# Patient Record
Sex: MALE | Race: WHITE | NOT HISPANIC OR LATINO | Employment: OTHER | URBAN - METROPOLITAN AREA
[De-identification: names, ages, dates, MRNs, and addresses within clinical notes are randomized per-mention and may not be internally consistent; named-entity substitution may affect disease eponyms.]

---

## 2017-01-09 ENCOUNTER — OFFICE VISIT (OUTPATIENT)
Dept: CARDIOLOGY | Facility: CLINIC | Age: 77
End: 2017-01-09

## 2017-01-09 VITALS
WEIGHT: 183 LBS | SYSTOLIC BLOOD PRESSURE: 128 MMHG | HEART RATE: 56 BPM | HEIGHT: 69 IN | BODY MASS INDEX: 27.11 KG/M2 | DIASTOLIC BLOOD PRESSURE: 70 MMHG

## 2017-01-09 DIAGNOSIS — E78.5 HYPERLIPIDEMIA, UNSPECIFIED HYPERLIPIDEMIA TYPE: ICD-10-CM

## 2017-01-09 DIAGNOSIS — I25.10 OCCLUSIVE CORONARY ARTERY DISEASE: ICD-10-CM

## 2017-01-09 DIAGNOSIS — Z95.5 HISTORY OF CORONARY ARTERY STENT PLACEMENT: ICD-10-CM

## 2017-01-09 DIAGNOSIS — I10 BENIGN ESSENTIAL HYPERTENSION: Primary | ICD-10-CM

## 2017-01-09 PROCEDURE — 93000 ELECTROCARDIOGRAM COMPLETE: CPT | Performed by: PHYSICIAN ASSISTANT

## 2017-01-09 PROCEDURE — 99214 OFFICE O/P EST MOD 30 MIN: CPT | Performed by: PHYSICIAN ASSISTANT

## 2017-01-09 NOTE — PROGRESS NOTES
Date of Office Visit: 2017  Encounter Provider: LEBRON Saldaña  Place of Service: [facility] CARDIOLOGY  Patient Name: Sergey Mendoza  :1940    Chief Complaint   Patient presents with   • Coronary Artery Disease     1 year follow up   :     HPI: Sergey Mendoza is a 76 y.o. male, new to me, who presents today for follow-up.  Old records have been obtained and reviewed by me.  He is a patient of Dr. Manning's with a past medical history significant for coronary artery disease, status post stenting to his LAD in .  He also had a 30% circumflex and a 30% RCA lesion at that time.  He also has a chronically occluded diagonal branch.  At one point he had ischemic cardiomyopathy, however this resolved after treating his LAD lesion.  He was last seen in our office on 2015 by Dr. Manning.  At that visit, he was doing well from a cardiac standpoint.   Over the past year, he's been doing well.  He has no complaints of angina or heart failure.  His right knee eventually will need to be replaced, however he is trying to wait as long as possible before getting this done.  He does walk for exercise, and this does not affect his knee.  During the winter months he notices that he is not walking as much as.  During the summer, he walks about 3 miles 5 days a week.      Past Medical History   Diagnosis Date   • History of acute sinusitis 2016--patient seen in follow-up and reports his symptoms have resolved.  2016--patient was evaluated at the urgent care for complaints of a sore throat as well as head congestion and sinus pressure.  It is interesting in that there are 2 by the same physician on this day and the presenting complaint is a sore throat on one and chest pain on the other.  However, the assessment noted on the presentation with chest pain was acute maxillary sinusitis.  At any rate, patient did not have chest pain at that visit.  He was treated with an  unknown antibiotic (this was not documented).   • History of bone density study 07/05/2005 07/05/2005--normal bone density study.   • History of cardiac catheterization 10/8/2010     10/08/2010--patient presented with an anterior myocardial infarction. Heart catheterization revealed anterior/apical hypokinesis with 40% ejection fraction. Severe 80% ostial LAD stenosis. Mild 10% left main. Mild diffuse LAD. Totally occluded diagonal one. 30% diffuse circumflex. Mild 30% percent diffuse RCA. Angioplasty with drug eluding stent performed to the proximal LAD.    • History of cardiovascular stress test 02/23/2011 02/23/2011--stress test revealed mild anterior wall ischemia. Ejection fraction 64%.   • History of carotid Doppler/vascular screen 03/30/2011 03/30/2011--vascular screen revealed mild bilateral carotid plaque, negative for abdominal aneurysm, negative for PAD.   04/21/2009--vascular screen was negative for carotid plaque.   • History of Chronic Duodenitis 1/9/2012 01/09/2012--EGD revealed mild distal erythema of the stomach and proximal duodenum.   • History of closed Colles' fracture of left radius 05/05/1953     13 years of age.   • History of elevated prostate specific antigen (PSA) 03/19/2014 03/19/2014--he was seen in followup and his PSA was back down to 3.9. Patient was not having any urinary symptoms.  12/11/2013--patient was evaluated by the urologist and he elected to just observe the PSA.   11/06/2013--patient seen in followup in his PSA returned even higher at 5.7. He was referred back to urology.   11/06/2013--PSA returned elevated at 4.3. Patient did have some urinary symptom   • History of Flu-like symptoms 02/28/2015 02/28/2015--patient reports that one week ago he began to develop a significant persistent cough that is dry and nonproductive. A couple of days ago he began to develop head congestion, posterior nasal drainage, and a marked sore throat associated with  fatigue and diffuse aches and pains. He has had no fever and in fact his temperature has been running a little supple normal. No shaking chills. No   • History of Holter monitoring 03/14/2006 03/14/2006--Holter monitor performed for palpitations revealed brief episodes of atrial tachycardia only.   • History of pneumococcal vaccination 10/31/2011     10/31/2011--Pneumovax given.   • History of Tear of medial meniscus of right knee 10/17/2014     12/09/2014--patient was evaluated by the orthopedist. He felt that patient also had end stage osteoarthritis the right knee and will eventually need a total knee replacement. In the meantime he gave a cortisone injection in the knee and patient reports this did help. Patient also went to physical therapy which seemed to help somewhat also.   10/29/2014--MRI of the right knee reveals tricompartmental osteoarthritis that is severe in the medial compartment. There is a degenerative tear of the medial meniscus. There is a joint effusion with Baker's cyst. Intramuscular extension of the Baker's cyst into the medial head of the gastrocnemius muscle. Patient referred to orthopedics.   10/17/2014--x-ray of the right knee reveals a small suprapatellar effusion. Narrowing of the medial compartment. There is patellar spur formation. No fracture, dislocation, bone lesion is demonstrated.   10/17/2014--patient was on tour bus in [place] this past October 2013. He was trying to sit down and was in an awkward position.        Past Surgical History   Procedure Laterality Date   • Cardiac catheterization  10/08/2010     See past medical history   • Coronary angioplasty with stent placement  10/10/2010     10/10/2010--critical 80% proximal LAD stenosis treated using a drug-coated stent. Unable to cross diagonal one.   • Colonoscopy  01/09/2012 01/09/2012--normal colonoscopy except for diffuse diverticulosis.    • Colonoscopy  2004 2004--colonoscopy normal.   • Esophagoscopy / egd   01/09/2012 01/09/2012--EGD performed for iron deficiency anemia revealed mild erythema of the distal stomach and proximal duodenum consistent with duodenitis.   • Prostate biopsy  07/07/2008 07/07/2008--negative prostate biopsy.  Patient had a history of elevated PSA and found to have benign prostatic hypertrophy.        Social History     Social History   • Marital status:      Spouse name: N/A   • Number of children: N/A   • Years of education: N/A     Occupational History   • Retired - [employer]      Social History Main Topics   • Smoking status: Former Smoker   • Smokeless tobacco: Never Used      Comment: Stopped smoking in his early 20s.   • Alcohol use Yes      Comment: Socially   • Drug use: No   • Sexual activity: Yes     Partners: Female     Other Topics Concern   • Not on file     Social History Narrative       Family History   Problem Relation Age of Onset   • Coronary artery disease Mother    • Stroke Mother    • No Known Problems Father    • Cancer Maternal Grandmother    • Heart attack Maternal Grandfather    • Heart disease Maternal Grandfather    • No Known Problems Paternal Grandmother    • No Known Problems Paternal Grandfather        Review of Systems   Constitution: Negative for chills, fever, malaise/fatigue, weight gain and weight loss.   HENT: Negative for ear pain, headaches, hearing loss, nosebleeds and sore throat.    Eyes: Negative for double vision, pain and visual disturbance.   Cardiovascular: Negative for chest pain, dyspnea on exertion, irregular heartbeat, leg swelling, near-syncope, orthopnea, palpitations, paroxysmal nocturnal dyspnea and syncope.   Respiratory: Negative for cough, shortness of breath, sleep disturbances due to breathing, snoring and wheezing.    Endocrine: Negative for cold intolerance, heat intolerance and polyuria.   Skin: Negative for itching and rash.   Musculoskeletal: Positive for joint pain. Negative for joint swelling and  "myalgias.   Gastrointestinal: Negative for abdominal pain, diarrhea, melena, nausea and vomiting.   Genitourinary: Negative for frequency, hematuria and hesitancy.   Neurological: Negative for excessive daytime sleepiness, light-headedness, numbness, paresthesias and seizures.   Psychiatric/Behavioral: Negative for altered mental status and depression.   Allergic/Immunologic: Negative.    All other systems reviewed and are negative.      Allergies   Allergen Reactions   • Penicillins    • Sulfa Antibiotics          Current Outpatient Prescriptions:   •  aspirin 325 MG tablet, Take 81 tablets by mouth Daily., Disp: , Rfl:   •  Coenzyme Q10 (COQ10) 400 MG capsule, Take 1 capsule by mouth daily. Take with a meal., Disp: , Rfl:   •  ezetimibe (ZETIA) 10 MG tablet, 1 by mouth daily for cholesterol, Disp: 90 tablet, Rfl: 3  •  ferrous sulfate 325 (65 FE) MG tablet, Take 1 tablet by mouth daily. Take with food., Disp: , Rfl:   •  metoprolol succinate XL (TOPROL-XL) 50 MG 24 hr tablet, TAKE 1 TABLET EVERY DAY, Disp: 90 tablet, Rfl: 1  •  simvastatin (ZOCOR) 40 MG tablet, TAKE 1 TABLET AT BEDTIME  FOR  CHOLESTEROL, Disp: 90 tablet, Rfl: 0     Objective:     Vitals:    01/09/17 1412   BP: 128/70   BP Location: Left arm   Pulse: 56   Weight: 183 lb (83 kg)   Height: 69\" (175.3 cm)     Body mass index is 27.02 kg/(m^2).    PHYSICAL EXAM:    Physical Exam   Constitutional: He is oriented to person, place, and time. He appears well-developed and well-nourished. No distress.   HENT:   Head: Normocephalic and atraumatic.   Eyes: Pupils are equal, round, and reactive to light.   Neck: No JVD present. No thyromegaly present.   Cardiovascular: Normal rate, regular rhythm, normal heart sounds and intact distal pulses.    No murmur heard.  Pulmonary/Chest: Effort normal and breath sounds normal. No respiratory distress.   Abdominal: Soft. Bowel sounds are normal. He exhibits no distension. There is no splenomegaly or hepatomegaly. There " is no tenderness.   Musculoskeletal: Normal range of motion. He exhibits no edema.   Neurological: He is alert and oriented to person, place, and time.   Skin: Skin is warm and dry. He is not diaphoretic. No erythema.   Psychiatric: He has a normal mood and affect. His behavior is normal. Judgment normal.         ECG 12 Lead  Date/Time: 1/9/2017 2:30 PM  Performed by: ELIZABETH CHAMBERS.  Authorized by: ELIZABETH CHAMBERS   Comparison: compared with previous ECG from 11/2/2015  Similar to previous ECG  Rhythm: sinus rhythm  BPM: 56  Clinical impression: normal ECG  Comments: Indication: Coronary artery disease, status post stent placement.              Assessment:       Diagnosis Plan   1. Benign essential hypertension     2. Occlusive coronary artery disease, 10/08/2010--anterior MI.  EF 40%.  PTCA with drug-eluting stent proximal LAD.  10% LM.  100% diagonal 1.  30% diffuse circumflex.  30% diffuse RCA.  ECG 12 Lead   3. Hyperlipidemia, unspecified hyperlipidemia type     4. History of coronary artery stent placement       Orders Placed This Encounter   Procedures   • ECG 12 Lead     This order was created via procedure documentation          Plan:       1.  Coronary artery disease, status post stent placement.  Overall he's doing relatively well.  We did talk about risk factor modification.  I would like for him to increase his exercise.  He states that he is eating a mostly heart healthy diet.  He is on good medical treatment.  Continue current medical regimen.    2.  Hypertension.  His blood pressure is well-controlled today at 128/70.  Continue current medical regimen.    3.  Hyperlipidemia.  He is on simvastatin and Saturday a.  Continue current medical regimen.    He will follow-up with Dr. Manning in 1 year or sooner if needed.    As always, it has been a pleasure to participate in your patient's care.      Sincerely,         Elizabeth Chambers PA-C

## 2017-01-09 NOTE — MR AVS SNAPSHOT
Sergey Mendoza   1/9/2017 2:20 PM   Office Visit    Dept Phone:  [phone]   Encounter #:  [id number]    Provider:  LEBRON Saldaña   Department:  [facility] CARDIOLOGY                Your Full Care Plan              Your Updated Medication List          This list is accurate as of: 1/9/17  2:43 PM.  Always use your most recent med list.                aspirin 325 MG tablet       CoQ10 400 MG capsule       ezetimibe 10 MG tablet   Commonly known as:  ZETIA   1 by mouth daily for cholesterol       ferrous sulfate 325 (65 FE) MG tablet       metoprolol succinate XL 50 MG 24 hr tablet   Commonly known as:  TOPROL-XL   TAKE 1 TABLET EVERY DAY       simvastatin 40 MG tablet   Commonly known as:  ZOCOR   TAKE 1 TABLET AT BEDTIME  FOR  CHOLESTEROL               We Performed the Following     ECG 12 Lead       You Were Diagnosed With        Codes Comments    Benign essential hypertension    -  Primary ICD-10-CM: I10  ICD-9-CM: 401.1     Occlusive coronary artery disease     ICD-10-CM: I25.10  ICD-9-CM: 414.00     Hyperlipidemia, unspecified hyperlipidemia type     ICD-10-CM: E78.5  ICD-9-CM: 272.4     History of coronary artery stent placement     ICD-10-CM: Z95.5  ICD-9-CM: V45.82       Instructions     None    Patient Instructions History      Upcoming Appointments     Visit Type Date Time Department    FOLLOW UP 1/9/2017  2:20 PM [facility]    [facility] 2/6/2017  7:50 AM [facility]    OFFICE VISIT 2/13/2017  7:00 AM [facility]      [employer] Signup     [facility] [employer] allows you to send messages to your doctor, view your test results, renew your prescriptions, schedule appointments, and more. To sign up, go to [employer] and click on the Sign Up Now link in the New User? box. Enter your [employer] Activation Code exactly as it appears below along with the last four digits of your Social Security Number and your Date of Birth  "() to complete the sign-up process. If you do not sign up before the expiration date, you must request a new code.    [employer] Activation Code: [email or web address]  Expires: 2017  2:43 PM    If you have questions, you can email [email or web address] or call [phone] to talk to our [employer] staff. Remember, [employer] is NOT to be used for urgent needs. For medical emergencies, dial 911.               Other Info from Your Visit           Your Appointments     2017  7:50 AM EST   [facility] with [facility] MED (--)    [address]  [place] [place] [id number] [phone]            2017  7:00 AM EST   Office Visit with Ramesh Blake MD   [facility] FAMILY AND INTERNAL MED (--)    [address]  [place] [address] [phone]           Arrive 15 minutes prior to appointment.            2018 10:00 AM EST   Follow Up with Lion Manning MD   [facility] CARDIOLOGY (--)    [address]  [place] [place] [address] [phone]           Arrive 15 minutes prior to appointment.              Allergies     Penicillins      Sulfa Antibiotics        Reason for Visit     Coronary Artery Disease 1 year follow up      Vital Signs     Blood Pressure Pulse Height Weight Body Mass Index Smoking Status    128/70 (BP Location: Left arm) 56 69\" (175.3 cm) 183 lb (83 kg) 27.02 kg/m2 Former Smoker      Problems and Diagnoses Noted     Benign essential hypertension    High cholesterol or triglycerides    Heart disease due to blocked artery    History of coronary artery stent placement            "

## 2017-02-07 LAB
25(OH)D3+25(OH)D2 SERPL-MCNC: 31.6 NG/ML (ref 30–100)
ALBUMIN SERPL-MCNC: 4.4 G/DL (ref 3.5–5.2)
ALBUMIN/GLOB SERPL: 1.8 G/DL
ALP SERPL-CCNC: 60 U/L (ref 39–117)
ALT SERPL-CCNC: 27 U/L (ref 1–41)
AST SERPL-CCNC: 23 U/L (ref 1–40)
BASOPHILS # BLD AUTO: 0 10*3/MM3 (ref 0–0.2)
BASOPHILS NFR BLD AUTO: 0 % (ref 0–1.5)
BILIRUB SERPL-MCNC: 1.1 MG/DL (ref 0.1–1.2)
BUN SERPL-MCNC: 14 MG/DL (ref 8–23)
BUN/CREAT SERPL: 12.1 (ref 7–25)
CALCIUM SERPL-MCNC: 9.6 MG/DL (ref 8.6–10.5)
CHLORIDE SERPL-SCNC: 101 MMOL/L (ref 98–107)
CHOLEST SERPL-MCNC: 101 MG/DL (ref 100–199)
CK SERPL-CCNC: 45 U/L (ref 20–200)
CO2 SERPL-SCNC: 29.3 MMOL/L (ref 22–29)
CREAT SERPL-MCNC: 1.16 MG/DL (ref 0.76–1.27)
EOSINOPHIL # BLD AUTO: 0.11 10*3/MM3 (ref 0–0.7)
EOSINOPHIL NFR BLD AUTO: 2.5 % (ref 0.3–6.2)
ERYTHROCYTE [DISTWIDTH] IN BLOOD BY AUTOMATED COUNT: 14.4 % (ref 11.5–14.5)
GLOBULIN SER CALC-MCNC: 2.5 GM/DL
GLUCOSE SERPL-MCNC: 96 MG/DL (ref 65–99)
HCT VFR BLD AUTO: 45.1 % (ref 40.4–52.2)
HDL SERPL-SCNC: 31.5 UMOL/L
HDLC SERPL-MCNC: 34 MG/DL
HGB BLD-MCNC: 14.2 G/DL (ref 13.7–17.6)
IMM GRANULOCYTES # BLD: 0 10*3/MM3 (ref 0–0.03)
IMM GRANULOCYTES NFR BLD: 0 % (ref 0–0.5)
IRON SATN MFR SERPL: 30 % (ref 20–50)
IRON SERPL-MCNC: 105 MCG/DL (ref 59–158)
LDL SERPL QN: 19.6 NM
LDL SERPL-SCNC: 447 NMOL/L
LDL SMALL SERPL-SCNC: 332 NMOL/L
LDLC SERPL CALC-MCNC: 31 MG/DL (ref 0–99)
LYMPHOCYTES # BLD AUTO: 0.79 10*3/MM3 (ref 0.9–4.8)
LYMPHOCYTES NFR BLD AUTO: 18 % (ref 19.6–45.3)
MCH RBC QN AUTO: 28.7 PG (ref 27–32.7)
MCHC RBC AUTO-ENTMCNC: 31.5 G/DL (ref 32.6–36.4)
MCV RBC AUTO: 91.1 FL (ref 79.8–96.2)
MONOCYTES # BLD AUTO: 0.43 10*3/MM3 (ref 0.2–1.2)
MONOCYTES NFR BLD AUTO: 9.8 % (ref 5–12)
NEUTROPHILS # BLD AUTO: 3.07 10*3/MM3 (ref 1.9–8.1)
NEUTROPHILS NFR BLD AUTO: 69.7 % (ref 42.7–76)
PLATELET # BLD AUTO: 99 10*3/MM3 (ref 140–500)
POTASSIUM SERPL-SCNC: 5 MMOL/L (ref 3.5–5.2)
PROT SERPL-MCNC: 6.9 G/DL (ref 6–8.5)
RBC # BLD AUTO: 4.95 10*6/MM3 (ref 4.6–6)
SODIUM SERPL-SCNC: 142 MMOL/L (ref 136–145)
T3FREE SERPL-MCNC: 3.5 PG/ML (ref 2–4.4)
T4 FREE SERPL-MCNC: 1.35 NG/DL (ref 0.93–1.7)
TIBC SERPL-MCNC: 351 MCG/DL
TRIGL SERPL-MCNC: 180 MG/DL (ref 0–149)
TSH SERPL DL<=0.005 MIU/L-ACNC: 4.04 MIU/ML (ref 0.27–4.2)
UIBC SERPL-MCNC: 246 MCG/DL
WBC # BLD AUTO: 4.4 10*3/MM3 (ref 4.5–10.7)

## 2017-02-13 ENCOUNTER — OFFICE VISIT (OUTPATIENT)
Dept: INTERNAL MEDICINE | Facility: CLINIC | Age: 77
End: 2017-02-13

## 2017-02-13 VITALS
OXYGEN SATURATION: 99 % | DIASTOLIC BLOOD PRESSURE: 68 MMHG | WEIGHT: 182.6 LBS | BODY MASS INDEX: 27.05 KG/M2 | SYSTOLIC BLOOD PRESSURE: 118 MMHG | HEART RATE: 53 BPM | HEIGHT: 69 IN

## 2017-02-13 DIAGNOSIS — I65.23 ATHEROSCLEROSIS OF BOTH CAROTID ARTERIES: Chronic | ICD-10-CM

## 2017-02-13 DIAGNOSIS — E78.5 HYPERLIPIDEMIA, UNSPECIFIED HYPERLIPIDEMIA TYPE: Primary | Chronic | ICD-10-CM

## 2017-02-13 DIAGNOSIS — N40.1 BENIGN NON-NODULAR PROSTATIC HYPERPLASIA WITH LOWER URINARY TRACT SYMPTOMS: Chronic | ICD-10-CM

## 2017-02-13 DIAGNOSIS — K21.9 GASTROESOPHAGEAL REFLUX DISEASE WITHOUT ESOPHAGITIS: Chronic | ICD-10-CM

## 2017-02-13 DIAGNOSIS — R16.1 SPLENOMEGALY: Chronic | ICD-10-CM

## 2017-02-13 DIAGNOSIS — Z23 NEED FOR PROPHYLACTIC VACCINATION AGAINST STREPTOCOCCUS PNEUMONIAE (PNEUMOCOCCUS): ICD-10-CM

## 2017-02-13 DIAGNOSIS — R97.20 ELEVATED PSA: Chronic | ICD-10-CM

## 2017-02-13 DIAGNOSIS — I10 BENIGN ESSENTIAL HYPERTENSION: Chronic | ICD-10-CM

## 2017-02-13 DIAGNOSIS — E55.9 VITAMIN D DEFICIENCY: Chronic | ICD-10-CM

## 2017-02-13 DIAGNOSIS — Z86.2 HISTORY OF IRON DEFICIENCY ANEMIA: Chronic | ICD-10-CM

## 2017-02-13 DIAGNOSIS — I25.10 OCCLUSIVE CORONARY ARTERY DISEASE: Chronic | ICD-10-CM

## 2017-02-13 PROCEDURE — 99214 OFFICE O/P EST MOD 30 MIN: CPT | Performed by: INTERNAL MEDICINE

## 2017-02-13 PROCEDURE — 90670 PCV13 VACCINE IM: CPT | Performed by: INTERNAL MEDICINE

## 2017-02-13 PROCEDURE — G0009 ADMIN PNEUMOCOCCAL VACCINE: HCPCS | Performed by: INTERNAL MEDICINE

## 2017-02-13 NOTE — PROGRESS NOTES
02/13/2017    Patient Information  Sergey Mendoza                                                                                          [address]  [facility] [address]      1940  [phone]      Chief Complaint:     Follow-up hyperlipidemia, coronary artery disease, carotid artery plaque, BPH, elevated PSA, hypertension, reflux, iron deficiency anemia.  No new acute complaints.    History of Present Illness:    Patient with a history of medical problems as outlined in the chief complaint that have been fairly stable over the past year.  He presents today for a routine lab and follow-up.  Seems to be tolerating his medications well.  Patient does have osteoarthritis of the right knee and is followed by the orthopedist.  Total knee replacement was given as an option and is just a matter of when patient wants to proceed.  His knee problem is getting worse over time but I don't think he is quite ready to commit to a knee replacement.  Past medical history extensively reviewed and updated where necessary.  This reveals the patient had a fairly recent carotid artery Doppler study which we will review.  It also reveals he is up-to-date on his colonoscopy.  It also reveals that he needs his final pneumococcal vaccination and also should get a Zostavax.  He was evaluated by the cardiologist fairly recently and given a clean bill of health.  He has an elevated PSA and BPH and is followed by the urologist.    Review of Systems   Constitution: Negative.   HENT: Negative.    Eyes: Negative.    Cardiovascular: Negative.    Respiratory: Negative.    Endocrine: Negative.    Hematologic/Lymphatic: Negative.    Skin: Negative.    Musculoskeletal: Negative.    Gastrointestinal: Negative.    Genitourinary: Negative.    Neurological: Negative.    Psychiatric/Behavioral: Negative.    Allergic/Immunologic: Negative.        Active Problems:    Patient Active Problem List   Diagnosis   • Benign essential  hypertension   • Benign prostatic hypertrophy, 07/07/2008--negative biopsy.   • Carotid artery plaque, 08/11/2016--mild bilateral carotid artery plaque.   • Occlusive coronary artery disease, 10/08/2010--anterior MI.  EF 40%.  PTCA with drug-eluting stent proximal LAD.  10% LM.  100% diagonal 1.  30% diffuse circumflex.  30% diffuse RCA.   • Diverticulosis of colon   • Gastroesophageal reflux disease without esophagitis   • Generalized osteoarthritis of multiple sites   • Hyperlipidemia   • History of myocardial infarction, 10/08/2010--patient presented with an anterior myocardial infarction.   • Osteoarthritis of right knee   • History of PTCA, 10/08/2010--anterior MI.  EF 40%.  PTCA with drug-eluting stent proximal LAD.  10% LM.  100% diagonal 1.  30% diffuse circumflex.  30% diffuse RCA.  2011--EF 64%   • Vitamin D deficiency   • Elevated PSA   • Internal hemorrhoids   • Therapeutic drug monitoring   • Splenomegaly   • History of iron deficiency anemia   • Multiple actinic keratoses         Past Medical History   Diagnosis Date   • History of acute sinusitis 4/29/2016 08/02/2016--patient seen in follow-up and reports his symptoms have resolved.  04/29/2016--patient was evaluated at the urgent care for complaints of a sore throat as well as head congestion and sinus pressure.  It is interesting in that there are 2 by the same physician on this day and the presenting complaint is a sore throat on one and chest pain on the other.  However, the assessment noted on the presentation with chest pain was acute maxillary sinusitis.  At any rate, patient did not have chest pain at that visit.  He was treated with an unknown antibiotic (this was not documented).   • History of bone density study 07/05/2005 07/05/2005--normal bone density study.   • History of cardiac catheterization 10/8/2010     10/08/2010--patient presented with an anterior myocardial infarction. Heart catheterization revealed anterior/apical  hypokinesis with 40% ejection fraction. Severe 80% ostial LAD stenosis. Mild 10% left main. Mild diffuse LAD. Totally occluded diagonal one. 30% diffuse circumflex. Mild 30% percent diffuse RCA. Angioplasty with drug eluding stent performed to the proximal LAD.    • History of cardiovascular stress test 02/23/2011 02/23/2011--stress test revealed mild anterior wall ischemia. Ejection fraction 64%.   • History of carotid Doppler/vascular screen 8/11/2016 08/11/2016--carotid Doppler study reveals mild bilateral carotid plaque.  05/28/2014--vascular screen reveals mild bilateral carotid plaque, negative for AAA, negative for PAD. 0  3/30/2011--vascular screen revealed mild bilateral carotid plaque, negative for abdominal aneurysm, negative for PAD.   04/21/2009--vascular screen was negative for carotid plaque.   • History of Chronic Duodenitis 1/9/2012 01/09/2012--EGD revealed mild distal erythema of the stomach and proximal duodenum.   • History of closed Colles' fracture of left radius 05/05/1953     13 years of age.   • History of Holter monitoring 03/14/2006 03/14/2006--Holter monitor performed for palpitations revealed brief episodes of atrial tachycardia only.   • History of pneumococcal vaccination 3/24/2016     02/13/2017--Prevnar 13 given.  No further pneumococcal vaccinations required.  10/31/2011--Pneumovax given.   • History of Tear of medial meniscus of right knee 10/17/2014     12/09/2014--patient was evaluated by the orthopedist. He felt that patient also had end stage osteoarthritis the right knee and will eventually need a total knee replacement. In the meantime he gave a cortisone injection in the knee and patient reports this did help. Patient also went to physical therapy which seemed to help somewhat also.   10/29/2014--MRI of the right knee reveals tricompartmental osteoarthritis that is severe in the medial compartment. There is a degenerative tear of the medial meniscus. There is  a joint effusion with Baker's cyst. Intramuscular extension of the Baker's cyst into the medial head of the gastrocnemius muscle. Patient referred to orthopedics.   10/17/2014--x-ray of the right knee reveals a small suprapatellar effusion. Narrowing of the medial compartment. There is patellar spur formation. No fracture, dislocation, bone lesion is demonstrated.   10/17/2014--patient was on tour bus in [place] this past October 2013. He was trying to sit down and was in an awkward position.          Past Surgical History   Procedure Laterality Date   • Cardiac catheterization  10/08/2010     See past medical history   • Coronary angioplasty with stent placement  10/10/2010     10/10/2010--critical 80% proximal LAD stenosis treated using a drug-coated stent. Unable to cross diagonal one.   • Colonoscopy  01/09/2012 01/09/2012--normal colonoscopy except for diffuse diverticulosis.    • Colonoscopy  2004 2004--colonoscopy normal.   • Esophagoscopy / egd  01/09/2012 01/09/2012--EGD performed for iron deficiency anemia revealed mild erythema of the distal stomach and proximal duodenum consistent with duodenitis.   • Prostate biopsy  07/07/2008 07/07/2008--negative prostate biopsy.  Patient had a history of elevated PSA and found to have benign prostatic hypertrophy.          Allergies   Allergen Reactions   • Penicillins    • Sulfa Antibiotics            Current Outpatient Prescriptions:   •  aspirin 325 MG tablet, Take 81 tablets by mouth Daily., Disp: , Rfl:   •  Coenzyme Q10 (COQ10) 400 MG capsule, Take 1 capsule by mouth daily. Take with a meal., Disp: , Rfl:   •  ezetimibe (ZETIA) 10 MG tablet, 1 by mouth daily for cholesterol, Disp: 90 tablet, Rfl: 3  •  ferrous sulfate 325 (65 FE) MG tablet, Take 1 tablet by mouth daily. Take with food., Disp: , Rfl:   •  metoprolol succinate XL (TOPROL-XL) 50 MG 24 hr tablet, TAKE 1 TABLET EVERY DAY, Disp: 90 tablet, Rfl: 1  •  simvastatin (ZOCOR) 40 MG tablet,  "TAKE 1 TABLET AT BEDTIME  FOR  CHOLESTEROL, Disp: 90 tablet, Rfl: 0      Family History   Problem Relation Age of Onset   • Coronary artery disease Mother    • Stroke Mother    • No Known Problems Father    • Cancer Maternal Grandmother    • Heart attack Maternal Grandfather    • Heart disease Maternal Grandfather    • No Known Problems Paternal Grandmother    • No Known Problems Paternal Grandfather          Social History     Social History   • Marital status:      Spouse name: N/A   • Number of children: N/A   • Years of education: N/A     Occupational History   • Retired - [employer]      Social History Main Topics   • Smoking status: Former Smoker   • Smokeless tobacco: Never Used      Comment: Stopped smoking in his early 20s.   • Alcohol use Yes      Comment: Socially   • Drug use: No   • Sexual activity: Yes     Partners: Female     Other Topics Concern   • Not on file     Social History Narrative         Vitals:    02/13/17 0705   BP: 118/68   Pulse: 53   SpO2: 99%   Weight: 182 lb 9.6 oz (82.8 kg)   Height: 69\" (175.3 cm)          Physical Exam:    General: Alert and oriented x 3.  No acute distress.  Normal affect.  HEENT: Pupils equal, round, reactive to light; extraocular movements intact; sclerae nonicteric; pharynx, ear canals and TMs normal.  Neck: Without JVD, thyromegaly, bruit, or adenopathy.  Lungs: Clear to auscultation in all fields.  Heart: Regular rate and rhythm without murmur, rub, gallop, or click.  Abdomen: Soft, nontender, without hepatosplenomegaly or hernia.  Bowel sounds normal.  : Deferred.  Rectal: Deferred.  Extremities: Without clubbing, cyanosis, edema, or pulse deficit.  Neurologic: Intact without focal deficit.  Normal station and gait observed during ingress and egress from the examination room.  Skin: Without significant lesion.  Musculoskeletal: Unremarkable.      Lab/other results:    NMR reveals a total cholesterol 101.  Triglycerides slightly elevated at " 180.  LDL particle number excellent at 447.  Small LDL particle number excellent at 332.  HDL particle number normal at 31.5.  CBC is normal except white count slightly low at 4.4.  Platelets are low at 99.  Absolute lymphocytes are low.  CMP is normal.  Iron studies are normal.  Thyroid function tests normal.  Vitamin D normal.  CPK normal.    Assessment/Plan:     Diagnosis Plan   1. Hyperlipidemia, unspecified hyperlipidemia type     2. Occlusive coronary artery disease, 10/08/2010--anterior MI.  EF 40%.  PTCA with drug-eluting stent proximal LAD.  10% LM.  100% diagonal 1.  30% diffuse circumflex.  30% diffuse RCA.     3. Carotid artery plaque, 08/11/2016--mild bilateral carotid artery plaque.     4. Benign prostatic hypertrophy, 07/07/2008--negative biopsy.     5. Elevated PSA     6. Benign essential hypertension     7. Gastroesophageal reflux disease without esophagitis     8. History of iron deficiency anemia     9. Vitamin D deficiency         Patient has hyperlipidemia that is under excellent control.  This is particularly important given his coronary artery disease.  The coronary artery disease seems stable.  Patient has very mild carotid artery plaque and I reviewed a recent carotid Doppler scan which showed no progression.  Patient does have benign prostatic hypertrophy and had a negative prostate biopsy for an elevated PSA back in 2008.  He is followed by the urologist.  His blood pressure seems to be controlled.  Esophageal reflux is not been an issue.  It appears his iron deficiency anemia has resolved.  Vitamin D therapeutic.    Plan is as follows: Discontinue iron supplementation.  No other changes in medical regimen.  I will have patient follow up towards the end of the year for his annual Medicare wellness visit and at that time we can reassess his iron.  Prevnar 13 given.  I gave patient a prescription for the Zostavax to obtain at the local drug store.          Procedures

## 2017-02-14 ENCOUNTER — RESULTS ENCOUNTER (OUTPATIENT)
Dept: INTERNAL MEDICINE | Facility: CLINIC | Age: 77
End: 2017-02-14

## 2017-02-14 DIAGNOSIS — E55.9 VITAMIN D DEFICIENCY: Chronic | ICD-10-CM

## 2017-02-14 DIAGNOSIS — E78.5 HYPERLIPIDEMIA, UNSPECIFIED HYPERLIPIDEMIA TYPE: Chronic | ICD-10-CM

## 2017-02-14 DIAGNOSIS — R16.1 SPLENOMEGALY: Chronic | ICD-10-CM

## 2017-03-10 RX ORDER — SIMVASTATIN 40 MG
TABLET ORAL
Qty: 90 TABLET | Refills: 2 | Status: SHIPPED | OUTPATIENT
Start: 2017-03-10 | End: 2017-12-08 | Stop reason: SDUPTHER

## 2017-04-24 RX ORDER — METOPROLOL SUCCINATE 50 MG/1
TABLET, EXTENDED RELEASE ORAL
Qty: 90 TABLET | Refills: 2 | Status: SHIPPED | OUTPATIENT
Start: 2017-04-24 | End: 2018-02-02 | Stop reason: SDUPTHER

## 2017-12-11 RX ORDER — SIMVASTATIN 40 MG
TABLET ORAL
Qty: 90 TABLET | Refills: 0 | Status: SHIPPED | OUTPATIENT
Start: 2017-12-11 | End: 2018-03-15 | Stop reason: SDUPTHER

## 2018-02-05 RX ORDER — METOPROLOL SUCCINATE 50 MG/1
TABLET, EXTENDED RELEASE ORAL
Qty: 90 TABLET | Refills: 0 | Status: SHIPPED | OUTPATIENT
Start: 2018-02-05 | End: 2018-04-27 | Stop reason: SDUPTHER

## 2018-02-12 ENCOUNTER — OFFICE VISIT (OUTPATIENT)
Dept: CARDIOLOGY | Facility: CLINIC | Age: 78
End: 2018-02-12

## 2018-02-12 VITALS
SYSTOLIC BLOOD PRESSURE: 130 MMHG | WEIGHT: 182 LBS | DIASTOLIC BLOOD PRESSURE: 62 MMHG | BODY MASS INDEX: 26.96 KG/M2 | HEIGHT: 69 IN | HEART RATE: 56 BPM

## 2018-02-12 DIAGNOSIS — I25.10 CORONARY ARTERY DISEASE INVOLVING NATIVE CORONARY ARTERY OF NATIVE HEART WITHOUT ANGINA PECTORIS: Primary | ICD-10-CM

## 2018-02-12 PROCEDURE — 93000 ELECTROCARDIOGRAM COMPLETE: CPT | Performed by: INTERNAL MEDICINE

## 2018-02-12 PROCEDURE — 99213 OFFICE O/P EST LOW 20 MIN: CPT | Performed by: INTERNAL MEDICINE

## 2018-02-12 NOTE — PROGRESS NOTES
Subjective:     Encounter Date:02/12/2018      Patient ID: Sergey Mendoza is a 77 y.o. male.    Chief Complaint: CAD    History of Present Illness    Dear Dr. Blake:    I had the pleasure of seeing the patient in cardiac follow-up today.  As you well know, he is a beata, 77-year-old man with history of previous ischemic cardiomyopathy.  He had a high-grade left anterior descending lesion that was treated and his left ventricular function returned to normal.     Since I have last seen him, he reports doing great.  He has no stress.  He is getting around and has no symptoms.  He says that he could improve his frequency of physical activity.  He denies any symptoms of angina or heart failure.         Review of Systems   All other systems reviewed and are negative.        ECG 12 Lead  Date/Time: 2/12/2018 11:31 AM  Performed by: GAIL KRISHNAMURTHY  Authorized by: GAIL KRISHNAMURTHY   Comparison: compared with previous ECG   Similar to previous ECG  Rhythm: sinus rhythm  BPM: 56  Clinical impression: normal ECG               Objective:     Physical Exam   Constitutional: He is oriented to person, place, and time. He appears well-developed and well-nourished.   HENT:   Head: Normocephalic and atraumatic.   Neck: Normal range of motion. Neck supple.   Cardiovascular: Normal rate, regular rhythm and normal heart sounds.    Pulmonary/Chest: Effort normal and breath sounds normal.   Abdominal: Soft. Bowel sounds are normal.   Musculoskeletal: Normal range of motion.   Neurological: He is alert and oriented to person, place, and time.   Skin: Skin is warm and dry.   Psychiatric: He has a normal mood and affect. His behavior is normal. Thought content normal.   Vitals reviewed.      Lab Review:       Assessment:          Diagnosis Plan   1. Coronary artery disease involving native coronary artery of native heart without angina pectoris            Plan:       It was a pleasure to see your patient in cardiac follow-up today.  He is doing  very well from the cardiac standpoint without any complaints of angina.  I have made no changes to his medical regimen.  He will see me again in one year or sooner if symptoms warrant.    Coronary Artery Disease  Assessment  • The patient has no angina    Plan  • Lifestyle modifications discussed include adhering to a heart healthy diet, avoidance of tobacco products, maintenance of a healthy weight, medication compliance, regular exercise and regular monitoring of cholesterol and blood pressure    Subjective - Objective  • There has been a previous stent procedure using GLORIA  • Current antiplatelet therapy includes aspirin 81 mg

## 2018-02-13 LAB
25(OH)D3+25(OH)D2 SERPL-MCNC: 31.3 NG/ML (ref 30–100)
ALBUMIN SERPL-MCNC: 4.4 G/DL (ref 3.5–5.2)
ALBUMIN/GLOB SERPL: 1.7 G/DL
ALP SERPL-CCNC: 56 U/L (ref 39–117)
ALT SERPL-CCNC: 24 U/L (ref 1–41)
AST SERPL-CCNC: 21 U/L (ref 1–40)
BILIRUB SERPL-MCNC: 0.9 MG/DL (ref 0.1–1.2)
BUN SERPL-MCNC: 19 MG/DL (ref 8–23)
BUN/CREAT SERPL: 16.7 (ref 7–25)
CALCIUM SERPL-MCNC: 9.6 MG/DL (ref 8.6–10.5)
CHLORIDE SERPL-SCNC: 103 MMOL/L (ref 98–107)
CHOLEST SERPL-MCNC: 101 MG/DL (ref 100–199)
CK SERPL-CCNC: 40 U/L (ref 20–200)
CO2 SERPL-SCNC: 31.3 MMOL/L (ref 22–29)
CREAT SERPL-MCNC: 1.14 MG/DL (ref 0.76–1.27)
ERYTHROCYTE [DISTWIDTH] IN BLOOD BY AUTOMATED COUNT: 14.3 % (ref 11.5–14.5)
GFR SERPLBLD CREATININE-BSD FMLA CKD-EPI: 62 ML/MIN/1.73
GFR SERPLBLD CREATININE-BSD FMLA CKD-EPI: 75 ML/MIN/1.73
GLOBULIN SER CALC-MCNC: 2.6 GM/DL
GLUCOSE SERPL-MCNC: 93 MG/DL (ref 65–99)
HCT VFR BLD AUTO: 43.2 % (ref 40.4–52.2)
HDL SERPL-SCNC: 32.4 UMOL/L
HDLC SERPL-MCNC: 36 MG/DL
HGB BLD-MCNC: 13.4 G/DL (ref 13.7–17.6)
LDL SERPL QN: 19.7 NM
LDL SERPL-SCNC: 578 NMOL/L
LDL SMALL SERPL-SCNC: 439 NMOL/L
LDLC SERPL CALC-MCNC: 37 MG/DL (ref 0–99)
MCH RBC QN AUTO: 27.9 PG (ref 27–32.7)
MCHC RBC AUTO-ENTMCNC: 31 G/DL (ref 32.6–36.4)
MCV RBC AUTO: 89.8 FL (ref 79.8–96.2)
PLATELET # BLD AUTO: 106 10*3/MM3 (ref 140–500)
POTASSIUM SERPL-SCNC: 4.8 MMOL/L (ref 3.5–5.2)
PROT SERPL-MCNC: 7 G/DL (ref 6–8.5)
RBC # BLD AUTO: 4.81 10*6/MM3 (ref 4.6–6)
SODIUM SERPL-SCNC: 144 MMOL/L (ref 136–145)
T3FREE SERPL-MCNC: 3.2 PG/ML (ref 2–4.4)
T4 FREE SERPL-MCNC: 1.29 NG/DL (ref 0.93–1.7)
TRIGL SERPL-MCNC: 138 MG/DL (ref 0–149)
TSH SERPL DL<=0.005 MIU/L-ACNC: 3.72 MIU/ML (ref 0.27–4.2)
WBC # BLD AUTO: 3.1 10*3/MM3 (ref 4.5–10.7)

## 2018-02-19 ENCOUNTER — OFFICE VISIT (OUTPATIENT)
Dept: INTERNAL MEDICINE | Facility: CLINIC | Age: 78
End: 2018-02-19

## 2018-02-19 VITALS
DIASTOLIC BLOOD PRESSURE: 60 MMHG | HEART RATE: 79 BPM | BODY MASS INDEX: 26.66 KG/M2 | WEIGHT: 180 LBS | HEIGHT: 69 IN | SYSTOLIC BLOOD PRESSURE: 110 MMHG | OXYGEN SATURATION: 99 %

## 2018-02-19 DIAGNOSIS — R97.20 ELEVATED PSA: Chronic | ICD-10-CM

## 2018-02-19 DIAGNOSIS — I25.2 HISTORY OF MYOCARDIAL INFARCTION: Chronic | ICD-10-CM

## 2018-02-19 DIAGNOSIS — Z00.00 ROUTINE PHYSICAL EXAMINATION: Primary | ICD-10-CM

## 2018-02-19 DIAGNOSIS — I25.10 OCCLUSIVE CORONARY ARTERY DISEASE: Chronic | ICD-10-CM

## 2018-02-19 DIAGNOSIS — E78.5 HYPERLIPIDEMIA, UNSPECIFIED HYPERLIPIDEMIA TYPE: Chronic | ICD-10-CM

## 2018-02-19 DIAGNOSIS — Z51.81 THERAPEUTIC DRUG MONITORING: ICD-10-CM

## 2018-02-19 DIAGNOSIS — K80.20 ASYMPTOMATIC CHOLELITHIASIS: Chronic | ICD-10-CM

## 2018-02-19 DIAGNOSIS — N40.1 BENIGN NON-NODULAR PROSTATIC HYPERPLASIA WITH LOWER URINARY TRACT SYMPTOMS: Chronic | ICD-10-CM

## 2018-02-19 DIAGNOSIS — E55.9 VITAMIN D DEFICIENCY: Chronic | ICD-10-CM

## 2018-02-19 DIAGNOSIS — R31.29 MICROSCOPIC HEMATURIA: Chronic | ICD-10-CM

## 2018-02-19 DIAGNOSIS — Z98.61 HISTORY OF PTCA: Chronic | ICD-10-CM

## 2018-02-19 DIAGNOSIS — K21.9 GASTROESOPHAGEAL REFLUX DISEASE WITHOUT ESOPHAGITIS: Chronic | ICD-10-CM

## 2018-02-19 DIAGNOSIS — I10 BENIGN ESSENTIAL HYPERTENSION: Chronic | ICD-10-CM

## 2018-02-19 DIAGNOSIS — Z00.00 MEDICARE ANNUAL WELLNESS VISIT, SUBSEQUENT: ICD-10-CM

## 2018-02-19 DIAGNOSIS — I65.23 ATHEROSCLEROSIS OF BOTH CAROTID ARTERIES: Chronic | ICD-10-CM

## 2018-02-19 PROCEDURE — 96160 PT-FOCUSED HLTH RISK ASSMT: CPT | Performed by: INTERNAL MEDICINE

## 2018-02-19 PROCEDURE — G0439 PPPS, SUBSEQ VISIT: HCPCS | Performed by: INTERNAL MEDICINE

## 2018-02-19 PROCEDURE — 99397 PER PM REEVAL EST PAT 65+ YR: CPT | Performed by: INTERNAL MEDICINE

## 2018-02-19 RX ORDER — EZETIMIBE 10 MG/1
TABLET ORAL
Qty: 90 TABLET | Refills: 3
Start: 2018-02-19 | End: 2018-03-09 | Stop reason: SDUPTHER

## 2018-02-19 NOTE — PROGRESS NOTES
QUICK REFERENCE INFORMATION:  The ABCs of the Annual Wellness Visit    Subsequent Medicare Wellness Visit    HEALTH RISK ASSESSMENT    1940    Recent Hospitalizations:  No hospitalization(s) within the last year..        Current Medical Providers:  Patient Care Team:  Ramesh Blake MD as PCP - General (Internal Medicine)        Smoking Status:  History   Smoking Status   • Former Smoker   Smokeless Tobacco   • Never Used     Comment: Stopped smoking in his early 20s.       Alcohol Consumption:  History   Alcohol Use   • Yes     Comment: Socially       Depression Screen:   PHQ-2/PHQ-9 Depression Screening 2/19/2018   Little interest or pleasure in doing things 0   Feeling down, depressed, or hopeless 0   Total Score 0       Health Habits and Functional and Cognitive Screening:  Functional & Cognitive Status 2/19/2018   Do you have difficulty preparing food and eating? No   Do you have difficulty bathing yourself, getting dressed or grooming yourself? No   Do you have difficulty using the toilet? No   Do you have difficulty moving around from place to place? No   Do you have trouble with steps or getting out of a bed or a chair? No   In the past year have you fallen or experienced a near fall? No   Current Diet Well Balanced Diet   Dental Exam Up to date   Eye Exam Up to date   Exercise (times per week) 0 times per week   Current Exercise Activities Include None   Do you need help using the phone?  No   Are you deaf or do you have serious difficulty hearing?  No   Do you need help with transportation? No   Do you need help shopping? No   Do you need help preparing meals?  No   Do you need help with housework?  No   Do you need help with laundry? No   Do you need help taking your medications? No   Do you need help managing money? No   Have you felt unusual stress, anger or loneliness in the last month? No   Who do you live with? Spouse   If you need help, do you have trouble finding someone available to you? No    Have you been bothered in the last four weeks by sexual problems? No   Do you have difficulty concentrating, remembering or making decisions? No           Does the patient have evidence of cognitive impairment? No    Aspirin use counseling: Taking ASA appropriately as indicated      Recent Lab Results:  CMP:  Lab Results   Component Value Date    GLU 93 02/12/2018    BUN 19 02/12/2018    CREATININE 1.14 02/12/2018    EGFRIFNONA 62 02/12/2018    EGFRIFAFRI 75 02/12/2018    BCR 16.7 02/12/2018     02/12/2018    K 4.8 02/12/2018    CO2 31.3 (H) 02/12/2018    CALCIUM 9.6 02/12/2018    PROTENTOTREF 7.0 02/12/2018    ALBUMIN 4.40 02/12/2018    LABGLOBREF 2.6 02/12/2018    LABIL2 1.7 02/12/2018    BILITOT 0.9 02/12/2018    ALKPHOS 56 02/12/2018    AST 21 02/12/2018    ALT 24 02/12/2018     Lipid Panel:  Lab Results   Component Value Date    TRIG 138 02/12/2018    HDL 34 (L) 04/27/2015    VLDL 31 04/27/2015    LDLHDL 0.8 04/27/2015     HbA1c:       Visual Acuity:  No exam data present    Age-appropriate Screening Schedule:  Refer to the list below for future screening recommendations based on patient's age, sex and/or medical conditions. Orders for these recommended tests are listed in the plan section. The patient has been provided with a written plan.    Health Maintenance   Topic Date Due   • LIPID PANEL  02/12/2019   • COLONOSCOPY  01/09/2022   • TDAP/TD VACCINES (2 - Td) 02/13/2027   • INFLUENZA VACCINE  Addressed   • PNEUMOCOCCAL VACCINES (65+ LOW/MEDIUM RISK)  Completed   • ZOSTER VACCINE  Completed        Subjective   History of Present Illness    Sergey Mendoza is a 77 y.o. male who presents for an Subsequent Wellness Visit.    The following portions of the patient's history were reviewed and updated as appropriate: allergies, current medications, past family history, past medical history, past social history, past surgical history and problem list.    Outpatient Medications Prior to Visit   Medication Sig  Dispense Refill   • aspirin 81 MG tablet Take 81 mg by mouth Daily.     • Coenzyme Q10 (COQ10) 400 MG capsule Take 1 capsule by mouth daily. Take with a meal.     • metoprolol succinate XL (TOPROL-XL) 50 MG 24 hr tablet TAKE 1 TABLET EVERY DAY 90 tablet 0   • simvastatin (ZOCOR) 40 MG tablet TAKE 1 TABLET AT BEDTIME  FOR  CHOLESTEROL 90 tablet 0   • ezetimibe (ZETIA) 10 MG tablet 1 by mouth daily for cholesterol (Patient taking differently: Take 5 mg by mouth Daily. 1 by mouth daily for cholesterol) 90 tablet 3     No facility-administered medications prior to visit.        Patient Active Problem List   Diagnosis   • Benign essential hypertension   • Benign prostatic hypertrophy, 07/07/2008--negative biopsy.   • Carotid artery plaque, 08/11/2016--mild bilateral carotid artery plaque.   • Occlusive coronary artery disease, 10/08/2010--anterior MI.  EF 40%.  PTCA with drug-eluting stent proximal LAD.  10% LM.  100% diagonal 1.  30% diffuse circumflex.  30% diffuse RCA.   • Diverticulosis of colon   • Gastroesophageal reflux disease without esophagitis   • Generalized osteoarthritis of multiple sites   • Hyperlipidemia   • History of myocardial infarction, 10/08/2010--patient presented with an anterior myocardial infarction.   • Primary osteoarthritis of right knee   • History of PTCA, 10/08/2010--anterior MI.  EF 40%.  PTCA with drug-eluting stent proximal LAD.  10% LM.  100% diagonal 1.  30% diffuse circumflex.  30% diffuse RCA.  2011--EF 64%   • Vitamin D deficiency   • Elevated PSA   • Internal hemorrhoids   • Therapeutic drug monitoring   • Splenomegaly   • Multiple actinic keratoses   • Asymptomatic cholelithiasis   • Microscopic hematuria   • [employer] Medicare replacement physical exam       Advance Care Planning:  has an advance directive - a copy has been provided and is in file    Identification of Risk Factors:  Risk factors include: weight  and cardiovascular risk.    Review of Systems   Constitutional:  "Negative for activity change, appetite change, chills, fatigue, fever and unexpected weight change.   All other systems reviewed and are negative.      Compared to one year ago, the patient feels his physical health is the same.  Compared to one year ago, the patient feels his mental health is the same.    Objective       Physical Exam    General: Alert and oriented x 3.  No acute distress.  Normal affect.  HEENT: Pupils equal, round, reactive to light; extraocular movements intact; sclerae nonicteric; pharynx, ear canals and TMs normal.  Neck: Without JVD, thyromegaly, bruit, or adenopathy.  Lungs: Clear to auscultation in all fields.  Heart: Regular rate and rhythm without murmur, rub, gallop, or click.  Abdomen: Soft, nontender, without hepatosplenomegaly or hernia.  Bowel sounds normal.  : Deferred.  Rectal: Deferred.  Extremities: Without clubbing, cyanosis, edema, or pulse deficit.  Neurologic: Intact without focal deficit.  Normal station and gait observed during ingress and egress from the examination room.  Skin: Without significant lesion.  Musculoskeletal: Unremarkable.    Vitals:    02/19/18 1028   BP: 110/60   BP Location: Right arm   Patient Position: Sitting   Cuff Size: Adult   Pulse: 79   SpO2: 99%   Weight: 81.6 kg (180 lb)   Height: 175.3 cm (69.02\")   PainSc: 0-No pain       Body mass index is 26.57 kg/(m^2).  Discussed the patient's BMI with him. BMI is above normal parameters. Follow-up plan includes:  exercise counseling and nutrition counseling.    Assessment/Plan   Patient Self-Management and Personalized Health Advice  The patient has been provided with information about: diet and exercise and preventive services including:   · Diabetes screening, see lab orders, Exercise counseling provided, Fall Risk assessment done, Glaucoma screening recommended, Nutrition counseling provided, Prostate cancer screening discussed.    Visit Diagnoses:    ICD-10-CM ICD-9-CM   1. [employer] Medicare " replacement physical exam Z00.00 V70.0   2. Medicare annual wellness visit, subsequent Z00.00 V70.0   3. Occlusive coronary artery disease, 10/08/2010--anterior MI.  EF 40%.  PTCA with drug-eluting stent proximal LAD.  10% LM.  100% diagonal 1.  30% diffuse circumflex.  30% diffuse RCA. I25.10 414.00   4. History of myocardial infarction, 10/08/2010--patient presented with an anterior myocardial infarction. I25.2 412   5. History of PTCA, 10/08/2010--anterior MI.  EF 40%.  PTCA with drug-eluting stent proximal LAD.  10% LM.  100% diagonal 1.  30% diffuse circumflex.  30% diffuse RCA.  2011--EF 64% Z98.61 V45.82   6. Carotid artery plaque, 08/11/2016--mild bilateral carotid artery plaque. I65.23 433.10     433.30   7. Benign prostatic hypertrophy, 07/07/2008--negative biopsy. N40.1 600.91   8. Elevated PSA R97.20 790.93   9. Benign essential hypertension I10 401.1   10. Hyperlipidemia, unspecified hyperlipidemia type E78.5 272.4   11. Vitamin D deficiency E55.9 268.9   12. Asymptomatic cholelithiasis K80.20 574.20   13. Microscopic hematuria R31.29 599.72   14. Gastroesophageal reflux disease without esophagitis K21.9 530.81   15. Therapeutic drug monitoring Z51.81 V58.83       Orders Placed This Encounter   Procedures   • CBC (No Diff)     Standing Status:   Future     Standing Expiration Date:   2/20/2020   • CK     Standing Status:   Future     Standing Expiration Date:   2/20/2020   • Comprehensive Metabolic Panel     Standing Status:   Future     Standing Expiration Date:   2/20/2020   • NMR LipoProfile     Standing Status:   Future     Standing Expiration Date:   2/20/2020   • Vitamin D 25 Hydroxy     Standing Status:   Future     Standing Expiration Date:   2/20/2020   • TSH     Standing Status:   Future     Standing Expiration Date:   2/20/2020   • T4, Free     Standing Status:   Future     Standing Expiration Date:   2/20/2020   • T3, Free     Standing Status:   Future     Standing Expiration Date:    2/20/2020       Outpatient Encounter Prescriptions as of 2/19/2018   Medication Sig Dispense Refill   • aspirin 81 MG tablet Take 81 mg by mouth Daily.     • Coenzyme Q10 (COQ10) 400 MG capsule Take 1 capsule by mouth daily. Take with a meal.     • ezetimibe (ZETIA) 10 MG tablet 1 by mouth daily for cholesterol 90 tablet 3   • metoprolol succinate XL (TOPROL-XL) 50 MG 24 hr tablet TAKE 1 TABLET EVERY DAY 90 tablet 0   • simvastatin (ZOCOR) 40 MG tablet TAKE 1 TABLET AT BEDTIME  FOR  CHOLESTEROL 90 tablet 0   • [DISCONTINUED] ezetimibe (ZETIA) 10 MG tablet 1 by mouth daily for cholesterol (Patient taking differently: Take 5 mg by mouth Daily. 1 by mouth daily for cholesterol) 90 tablet 3     No facility-administered encounter medications on file as of 2/19/2018.        Reviewed use of high risk medication in the elderly: yes  Reviewed for potential of harmful drug interactions in the elderly: yes    Follow Up:  Return in about 1 year (around 2/19/2019) for Annual physical with lab prior.     An After Visit Summary and PPPS with all of these plans were given to the patient.

## 2018-02-19 NOTE — PROGRESS NOTES
02/19/2018    Patient Information  Sergey Mendoza                                                                                          [address]  [facility] [address]      1940  [phone]      Chief Complaint:     Routine annual physical examination.  Subsequent Medicare wellness visit.  Follow-up coronary artery disease with history of myocardial infarction and PTCA, carotid artery plaque, hypertension, BPH with elevated PSA, hyperlipidemia, vitamin D deficiency, esophageal reflux, recent evaluation for microscopic hematuria.  She has no new acute complaints.    History of Present Illness:    Patient with a history of medical problems as outlined in the chief complaint that have been fairly stable now for the past year with the exception of discovery of microscopic hematuria urologist recently.  Patient underwent evaluation including negative cystoscopy as well as recent CT scan of the abdomen and pelvis.  Unfortunately, I do not have that report but patient indicates this revealed a had gallstones, hardening of the arteries, and enlarged prostate.  We will attempt to obtain the formal report.  He is here today for his annual physical examination as well as his subsequent Medicare wellness visit.  He seems to be doing fairly well.  He was last evaluated about one year ago.  Past medical history reviewed and updated where necessary including health maintenance parameters.  This reveals he is up-to-date or else accounted for.    Review of Systems   Constitution: Negative.   HENT: Negative.    Eyes: Negative.    Cardiovascular: Negative.    Respiratory: Negative.    Endocrine: Negative.    Hematologic/Lymphatic: Negative.    Skin: Negative.    Musculoskeletal: Negative.    Gastrointestinal: Negative.    Genitourinary: Negative.    Neurological: Negative.    Psychiatric/Behavioral: Negative.    Allergic/Immunologic: Negative.        Active Problems:    Patient Active Problem List    Diagnosis   • Benign essential hypertension   • Benign prostatic hypertrophy, 07/07/2008--negative biopsy.   • Carotid artery plaque, 08/11/2016--mild bilateral carotid artery plaque.   • Occlusive coronary artery disease, 10/08/2010--anterior MI.  EF 40%.  PTCA with drug-eluting stent proximal LAD.  10% LM.  100% diagonal 1.  30% diffuse circumflex.  30% diffuse RCA.   • Diverticulosis of colon   • Gastroesophageal reflux disease without esophagitis   • Generalized osteoarthritis of multiple sites   • Hyperlipidemia   • History of myocardial infarction, 10/08/2010--patient presented with an anterior myocardial infarction.   • Primary osteoarthritis of right knee   • History of PTCA, 10/08/2010--anterior MI.  EF 40%.  PTCA with drug-eluting stent proximal LAD.  10% LM.  100% diagonal 1.  30% diffuse circumflex.  30% diffuse RCA.  2011--EF 64%   • Vitamin D deficiency   • Elevated PSA   • Internal hemorrhoids   • Therapeutic drug monitoring   • Splenomegaly   • Multiple actinic keratoses   • Asymptomatic cholelithiasis   • Microscopic hematuria   • [employer] Medicare replacement physical exam         Past Medical History:   Diagnosis Date   • Asymptomatic cholelithiasis 2/19/2018   • Benign essential hypertension 3/24/2006    Diagnosed approximately March 2006.   • Benign prostatic hypertrophy, 07/07/2008--negative biopsy. 7/7/2008    Patient had a history of elevated PSA and found to have benign prostatic hypertrophy.   07/07/2008--negative prostate biopsy.   • Carotid artery plaque, 08/11/2016--mild bilateral carotid artery plaque. 4/21/2009 08/11/2016--carotid Doppler study reveals mild bilateral carotid plaque.  05/28/2014--vascular screen reveals mild bilateral carotid plaque, negative for AAA, negative for PAD.   03/30/2011--vascular screen revealed mild bilateral carotid plaque, negative for abdominal aneurysm, negative for PAD.   04/21/2009--vascular screen was negative for carotid plaque.   •  Diverticulosis of colon 1/9/2012 01/09/2012--normal colonoscopy except for diffuse diverticulosis.   2004--colonoscopy normal.   • Elevated PSA 11/6/2013 02/13/2017--patient seen in follow-up and reports he is followed on a yearly basis by the urologist.  Last follow-up was in December 2016 and his PSA was less than 5.  07/28/2016--PSA elevated at 4.88.  03/19/2014--he was seen in followup and his PSA was back down to 3.9. Patient was not having any urinary symptoms.  12/11/2013--patient was evaluated by the urologist and he elected to just observe the PSA.   11/06/2013--patient seen in followup in his PSA returned even higher at 5.7. He was referred back to urology.   11/06/2013--PSA returned elevated at 4.3. Patient did have some urinary symptoms consisting of periods of weak stream and dribbling. He had never been treated for chronic prostatitis. I treated him with ciprofloxacin 500 mg for 30 days.       • Gastroesophageal reflux disease without esophagitis 3/24/2016   • Generalized osteoarthritis of multiple sites 3/24/2016   • History of acute sinusitis 4/29/2016 08/02/2016--patient seen in follow-up and reports his symptoms have resolved.  04/29/2016--patient was evaluated at the urgent care for complaints of a sore throat as well as head congestion and sinus pressure.  It is interesting in that there are 2 by the same physician on this day and the presenting complaint is a sore throat on one and chest pain on the other.  However, the assessment noted on the presentation with chest pain was acute maxillary sinusitis.  At any rate, patient did not have chest pain at that visit.  He was treated with an unknown antibiotic (this was not documented).   • History of bone density study 07/05/2005 07/05/2005--normal bone density study.   • History of cardiac catheterization 10/8/2010    10/08/2010--patient presented with an anterior myocardial infarction. Heart catheterization revealed anterior/apical  hypokinesis with 40% ejection fraction. Severe 80% ostial LAD stenosis. Mild 10% left main. Mild diffuse LAD. Totally occluded diagonal one. 30% diffuse circumflex. Mild 30% percent diffuse RCA. Angioplasty with drug eluding stent performed to the proximal LAD.    • History of cardiovascular stress test 02/23/2011 02/23/2011--stress test revealed mild anterior wall ischemia. Ejection fraction 64%.   • History of carotid Doppler/vascular screen 8/11/2016 08/11/2016--carotid Doppler study reveals mild bilateral carotid plaque.  05/28/2014--vascular screen reveals mild bilateral carotid plaque, negative for AAA, negative for PAD. 0  3/30/2011--vascular screen revealed mild bilateral carotid plaque, negative for abdominal aneurysm, negative for PAD.   04/21/2009--vascular screen was negative for carotid plaque.   • History of Chronic Duodenitis 1/9/2012 01/09/2012--EGD revealed mild distal erythema of the stomach and proximal duodenum.   • History of closed Colles' fracture of left radius 05[birth year]05/1953    13 years of age.   • History of Holter monitoring 03/14/2006 03/14/2006--Holter monitor performed for palpitations revealed brief episodes of atrial tachycardia only.   • History of iron deficiency anemia 6/23/2012 02/07/2017--hemoglobin normal at 14.2, hematocrit normal at 45.1.  Serum iron normal at 105.  Iron saturation normal at 30%.  Resolve this issue.  08/02/2016--patient seen in follow-up.  White count is low at 3.66.  Hemoglobin low at 13.3.  Hematocrit normal at 41.8.  Platelets low at 109.  Homocystine and methylmalonic acid are normal.  Serum iron normal at 98.  Iron saturation normal at 28.  Patient is taking iron sulfate 325 mg daily.  10/13/2013--CBC was entirely normal, anemia resolved we'll continue to monitor.  06/23/2012--patient treated for iron deficiency anemia caused by duodenitis which was revealed per EGD 01/09/2012.    • History of myocardial infarction, 10/08/2010--patient  presented with an anterior myocardial infarction. 10/8/2010    Stress test 02/23/2011--mild anterior wall ischemia, ejection fraction improved to 64%.  10/08/2010--patient presented with an anterior myocardial infarction. Heart catheterization revealed anterior/apical hypokinesis with 40% ejection fraction. Severe 80% ostial LAD stenosis. Mild 10% left main. Mild diffuse LAD. Totally occluded diagonal one. 30% diffuse circumflex. Mild 30% percent diffuse RCA. Angioplasty with drug eluding stent performed to the proximal LAD.    • History of pneumococcal vaccination 3/24/2016    02/13/2017--Prevnar 13 given.  No further pneumococcal vaccinations required.  10/31/2011--Pneumovax given.   • History of PTCA, 10/08/2010--anterior MI.  EF 40%.  PTCA with drug-eluting stent proximal LAD.  10% LM.  100% diagonal 1.  30% diffuse circumflex.  30% diffuse RCA.  2011--EF 64% 10/8/2010    Stress test 02/23/2011--mild anterior wall ischemia, ejection fraction improved to 64%.  10/08/2010--patient presented with an anterior myocardial infarction. Heart catheterization revealed anterior/apical hypokinesis with 40% ejection fraction. Severe 80% ostial LAD stenosis. Mild 10% left main. Mild diffuse LAD. Totally occluded diagonal one. 30% diffuse circumflex. Mild 30% percent diffuse RCA. Angioplasty with drug eluding stent performed to the proximal LAD.    • History of Tear of medial meniscus of right knee 10/17/2014    12/09/2014--patient was evaluated by the orthopedist. He felt that patient also had end stage osteoarthritis the right knee and will eventually need a total knee replacement. In the meantime he gave a cortisone injection in the knee and patient reports this did help. Patient also went to physical therapy which seemed to help somewhat also.   10/29/2014--MRI of the right knee reveals tricompartmental osteoarthritis that is severe in the medial compartment. There is a degenerative tear of the medial meniscus. There is a  joint effusion with Baker's cyst. Intramuscular extension of the Baker's cyst into the medial head of the gastrocnemius muscle. Patient referred to orthopedics.   10/17/2014--x-ray of the right knee reveals a small suprapatellar effusion. Narrowing of the medial compartment. There is patellar spur formation. No fracture, dislocation, bone lesion is demonstrated.   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 He was trying to sit down and was in an awkward position.    • Hyperlipidemia 3/6/2011    03/16/2011--treatment for hyperlipidemia and initiated.   • Internal hemorrhoids 3/24/2016   • Microscopic hematuria 2/19/2018 12/20/2017--patient was evaluated by the urologist for a routine follow-up elevated PSA.  He was noted to have microscopic hematuria with 3-5 RBCs.  Cystoscopy was reportedly negative.  Patient also had a CT scan of the abdomen and pelvis which revealed asymptomatic cholelithiasis, diffuse atherosclerosis, and enlarged prostate.   • Multiple actinic keratoses 8/2/2016 08/02/2016--patient presents with multiple actinic keratoses as well as seborrheic keratoses.  Dermatology referral given.   • Occlusive coronary artery disease, 10/08/2010--anterior MI.  EF 40%.  PTCA with drug-eluting stent proximal LAD.  10% LM.  100% diagonal 1.  30% diffuse circumflex.  30% diffuse RCA. 10/8/2010    02/23/2011--stress Cardiolite reveals mild anterior wall ischemia, ejection fraction improved to 64%.  10/08/2010--patient presented with an anterior myocardial infarction. Heart catheterization revealed anterior/apical hypokinesis with 40% ejection fraction. Severe 80% ostial LAD stenosis. Mild 10% left main. Mild diffuse LAD. Totally occluded diagonal one. 30% diffuse circumflex. Mild 30% percent diffuse RCA. Angioplasty with drug eluding stent performed to the proximal LAD.   10/08/2010--anterior MI.  EF 40%.  PTCA with drug-eluting stent proximal LAD.  10% LM.  100% diagonal 1.  30%  diffuse circumflex.  30% diffuse RCA.   • Primary osteoarthritis of right knee 10/17/2014    12/09/2014--patient was evaluated by the orthopedist. He felt that patient also had end stage osteoarthritis the right knee and will eventually need a total knee replacement. In the meantime he gave a cortisone injection in the knee and patient reports this did help. Patient also went to physical therapy which seemed to help somewhat also.  10/29/2014--MRI of the right knee reveals tricompartmental osteoarthritis that is severe in the medial compartment. There is a degenerative tear of the medial meniscus. There is a joint effusion with Baker's cyst. Intramuscular extension of the Baker's cyst into the medial head of the gastrocnemius muscle. Patient referred to orthopedics.   10/17/2014--x-ray of the right knee reveals a small suprapatellar effusion. Narrowing of the medial compartment. There is patellar spur formation. No fracture, dislocation, bone lesion is demonstrated.   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 He was trying to sit down and was in an awkward position. T   • Splenomegaly 8/2/2016 09/06/2013--CT scan reveals mild splenomegaly.  Although not in the report, the radiologist did compare the CT scan from 09/06/2013 to the one from 2010.  The spleen actually looks smaller in size, 13.5 cm compared to 15 cm previously.  In another direction, spleen is 20 cm as compared to 24 cm.  The last dimension was the maximum length.  08/04/2010--initial evaluation by the hematologist. WBCs 3.5. Differential normal. Absolute neutrophil count 2500. Hemoglobin normal at 14.2. MCV low at 83. Platelet count low at 125. IPF normal at 4.6%. Review of old labs through 1997 revealed a white blood cell count has fluctuated between 2.8 and 3.9. There is no trend downward but it is fluctuating. Platelet count has ranged between 148 and 198. MCV has been low. Liver spleen scan revealed mild splenomegaly.  B12, folic acid, iron studies, rheumatoid factor, SPEP have all been normal. Felt to be mild pancytopenia secondary to splenomegaly. Patient is followed on a regular basis by the hematologist.   • Vitamin D deficiency 3/24/2016         Past Surgical History:   Procedure Laterality Date   • CARDIAC CATHETERIZATION  10/08/2010    See past medical history   • COLONOSCOPY  01/09/2012 01/09/2012--normal colonoscopy except for diffuse diverticulosis.    • COLONOSCOPY  2004 2004--colonoscopy normal.   • CORONARY ANGIOPLASTY WITH STENT PLACEMENT  10/10/2010    10/10/2010--critical 80% proximal LAD stenosis treated using a drug-coated stent. Unable to cross diagonal one.   • ESOPHAGOSCOPY / EGD  01/09/2012 01/09/2012--EGD performed for iron deficiency anemia revealed mild erythema of the distal stomach and proximal duodenum consistent with duodenitis.   • PROSTATE BIOPSY  07/07/2008 07/07/2008--negative prostate biopsy.  Patient had a history of elevated PSA and found to have benign prostatic hypertrophy.          Allergies   Allergen Reactions   • Penicillins    • Sulfa Antibiotics            Current Outpatient Prescriptions:   •  aspirin 81 MG tablet, Take 81 mg by mouth Daily., Disp: , Rfl:   •  Coenzyme Q10 (COQ10) 400 MG capsule, Take 1 capsule by mouth daily. Take with a meal., Disp: , Rfl:   •  ezetimibe (ZETIA) 10 MG tablet, 1 by mouth daily for cholesterol, Disp: 90 tablet, Rfl: 3  •  metoprolol succinate XL (TOPROL-XL) 50 MG 24 hr tablet, TAKE 1 TABLET EVERY DAY, Disp: 90 tablet, Rfl: 0  •  simvastatin (ZOCOR) 40 MG tablet, TAKE 1 TABLET AT BEDTIME  FOR  CHOLESTEROL, Disp: 90 tablet, Rfl: 0      Family History   Problem Relation Age of Onset   • Coronary artery disease Mother    • Stroke Mother    • No Known Problems Father    • Cancer Maternal Grandmother    • Heart attack Maternal Grandfather    • Heart disease Maternal Grandfather    • No Known Problems Paternal Grandmother    • No Known Problems  "Paternal Grandfather          Social History     Social History   • Marital status:      Spouse name: N/A   • Number of children: N/A   • Years of education: N/A     Occupational History   • Retired - [employer]      Social History Main Topics   • Smoking status: Former Smoker   • Smokeless tobacco: Never Used      Comment: Stopped smoking in his early 20s.   • Alcohol use Yes      Comment: Socially   • Drug use: No   • Sexual activity: Yes     Partners: Female     Other Topics Concern   • Not on file     Social History Narrative         Vitals:    02/19/18 1028   BP: 110/60   BP Location: Right arm   Patient Position: Sitting   Cuff Size: Adult   Pulse: 79   SpO2: 99%   Weight: 81.6 kg (180 lb)   Height: 175.3 cm (69.02\")          Physical Exam:    General: Alert and oriented x 3.  No acute distress.  Normal affect.  HEENT: Pupils equal, round, reactive to light; extraocular movements intact; sclerae nonicteric; pharynx, ear canals and TMs normal.  Neck: Without JVD, thyromegaly, bruit, or adenopathy.  Lungs: Clear to auscultation in all fields.  Heart: Regular rate and rhythm without murmur, rub, gallop, or click.  Abdomen: Soft, nontender, without hepatosplenomegaly or hernia.  Bowel sounds normal.  : Deferred.  Rectal: Deferred.  Extremities: Without clubbing, cyanosis, edema, or pulse deficit.  Neurologic: Intact without focal deficit.  Normal station and gait observed during ingress and egress from the examination room.  Skin: Without significant lesion.  Musculoskeletal: Unremarkable.      Lab/other results:    NMR is essentially perfect.  CMP essentially normal.  CBC normal except white count slightly low at 3.1, hemoglobin slightly low at 13.4 but hematocrit is normal at 43.2.  Platelets are also low 106.  Thyroid function tests normal.  Vitamin D normal.  CPK normal.    I reviewed the patient's urology consultation from December 2017.    Assessment/Plan:     Diagnosis Plan   1. [employer] " Medicare replacement physical exam     2. Medicare annual wellness visit, subsequent     3. Occlusive coronary artery disease, 10/08/2010--anterior MI.  EF 40%.  PTCA with drug-eluting stent proximal LAD.  10% LM.  100% diagonal 1.  30% diffuse circumflex.  30% diffuse RCA.     4. History of myocardial infarction, 10/08/2010--patient presented with an anterior myocardial infarction.     5. History of PTCA, 10/08/2010--anterior MI.  EF 40%.  PTCA with drug-eluting stent proximal LAD.  10% LM.  100% diagonal 1.  30% diffuse circumflex.  30% diffuse RCA.  2011--EF 64%     6. Carotid artery plaque, 08/11/2016--mild bilateral carotid artery plaque.     7. Benign prostatic hypertrophy, 07/07/2008--negative biopsy.     8. Elevated PSA     9. Benign essential hypertension     10. Hyperlipidemia, unspecified hyperlipidemia type  ezetimibe (ZETIA) 10 MG tablet   11. Vitamin D deficiency     12. Asymptomatic cholelithiasis     13. Microscopic hematuria     14. Gastroesophageal reflux disease without esophagitis     15. Therapeutic drug monitoring       The subsequent Medicare wellness visit is documented on a separate note.    Patient presents with essentially normal annual physical examination except for the following issues: He has coronary artery disease which appears to be stable.  He sees a cardiologist on a regular basis.  He has carotid artery plaque and needs a repeat carotid Doppler study later this year.  Patient also has BPH with history of elevated PSA and is followed by the urologist.  Recently evaluated for microscopic hematuria and workup was essentially negative.  Hyperlipidemia is under excellent control which is important given his carotid artery plaque and coronary artery disease.  Blood pressure seems controlled as well.  Patient has a new diagnosis of cholelithiasis which is asymptomatic.  Symptoms of biliary colic reviewed.  Reflux has not been much of an issue and patient only occasionally takes a  Tums.    Plan is as follows: No change in current medical regimen.  We will schedule carotid Doppler study to be performed sometime in September or later.  Patient can follow-up on the phone for the results.  Otherwise, I will have him follow-up in one year for his annual and subsequent Medicare wellness visit or he can follow-up on an as-needed basis.        Procedures

## 2018-03-09 DIAGNOSIS — E78.5 HYPERLIPIDEMIA, UNSPECIFIED HYPERLIPIDEMIA TYPE: Chronic | ICD-10-CM

## 2018-03-09 RX ORDER — EZETIMIBE 10 MG/1
TABLET ORAL
Qty: 90 TABLET | Refills: 3 | Status: SHIPPED | OUTPATIENT
Start: 2018-03-09 | End: 2019-03-19 | Stop reason: SDUPTHER

## 2018-03-15 RX ORDER — SIMVASTATIN 40 MG
40 TABLET ORAL NIGHTLY
Qty: 90 TABLET | Refills: 1 | Status: SHIPPED | OUTPATIENT
Start: 2018-03-15 | End: 2018-09-09 | Stop reason: SDUPTHER

## 2018-04-27 RX ORDER — METOPROLOL SUCCINATE 50 MG/1
TABLET, EXTENDED RELEASE ORAL
Qty: 90 TABLET | Refills: 1 | Status: SHIPPED | OUTPATIENT
Start: 2018-04-27 | End: 2018-11-19 | Stop reason: SDUPTHER

## 2018-09-10 ENCOUNTER — HOSPITAL ENCOUNTER (OUTPATIENT)
Dept: CARDIOLOGY | Facility: HOSPITAL | Age: 78
Discharge: HOME | End: 2018-09-10
Admitting: INTERNAL MEDICINE

## 2018-09-10 LAB
BH CV XLRA MEAS LEFT DIST CCA EDV: -16.5 CM/SEC
BH CV XLRA MEAS LEFT DIST CCA PSV: -70.7 CM/SEC
BH CV XLRA MEAS LEFT DIST ICA EDV: -22.8 CM/SEC
BH CV XLRA MEAS LEFT DIST ICA PSV: -69.1 CM/SEC
BH CV XLRA MEAS LEFT MID ICA EDV: -25.9 CM/SEC
BH CV XLRA MEAS LEFT MID ICA PSV: -79.4 CM/SEC
BH CV XLRA MEAS LEFT PROX CCA EDV: 20.4 CM/SEC
BH CV XLRA MEAS LEFT PROX CCA PSV: 103 CM/SEC
BH CV XLRA MEAS LEFT PROX ECA EDV: -7 CM/SEC
BH CV XLRA MEAS LEFT PROX ECA PSV: -52.3 CM/SEC
BH CV XLRA MEAS LEFT PROX ICA EDV: -18.7 CM/SEC
BH CV XLRA MEAS LEFT PROX ICA PSV: -64.5 CM/SEC
BH CV XLRA MEAS LEFT PROX SCLA PSV: 70.9 CM/SEC
BH CV XLRA MEAS LEFT VERTEBRAL A EDV: -7.8 CM/SEC
BH CV XLRA MEAS LEFT VERTEBRAL A PSV: -42 CM/SEC
BH CV XLRA MEAS RIGHT DIST CCA EDV: -15.8 CM/SEC
BH CV XLRA MEAS RIGHT DIST CCA PSV: -78.6 CM/SEC
BH CV XLRA MEAS RIGHT DIST ICA EDV: -18.8 CM/SEC
BH CV XLRA MEAS RIGHT DIST ICA PSV: -63.3 CM/SEC
BH CV XLRA MEAS RIGHT MID ICA EDV: -19.3 CM/SEC
BH CV XLRA MEAS RIGHT MID ICA PSV: -66.8 CM/SEC
BH CV XLRA MEAS RIGHT PROX CCA EDV: -16.4 CM/SEC
BH CV XLRA MEAS RIGHT PROX CCA PSV: -75 CM/SEC
BH CV XLRA MEAS RIGHT PROX ECA EDV: -6.4 CM/SEC
BH CV XLRA MEAS RIGHT PROX ECA PSV: -64.5 CM/SEC
BH CV XLRA MEAS RIGHT PROX ICA EDV: -13.5 CM/SEC
BH CV XLRA MEAS RIGHT PROX ICA PSV: -57.4 CM/SEC
BH CV XLRA MEAS RIGHT PROX SCLA PSV: 76.2 CM/SEC
BH CV XLRA MEAS RIGHT VERTEBRAL A EDV: 12.2 CM/SEC
BH CV XLRA MEAS RIGHT VERTEBRAL A PSV: 49.1 CM/SEC
LEFT ARM BP: NORMAL MMHG
RIGHT ARM BP: NORMAL MMHG

## 2018-09-10 PROCEDURE — 93880 EXTRACRANIAL BILAT STUDY: CPT

## 2018-09-10 RX ORDER — SIMVASTATIN 40 MG
40 TABLET ORAL NIGHTLY
Qty: 90 TABLET | Refills: 1 | Status: SHIPPED | OUTPATIENT
Start: 2018-09-10 | End: 2019-03-13 | Stop reason: SDUPTHER

## 2018-11-20 RX ORDER — METOPROLOL SUCCINATE 50 MG/1
TABLET, EXTENDED RELEASE ORAL
Qty: 90 TABLET | Refills: 0 | Status: SHIPPED | OUTPATIENT
Start: 2018-11-20 | End: 2019-02-18 | Stop reason: SDUPTHER

## 2018-12-12 ENCOUNTER — OFFICE VISIT (OUTPATIENT)
Dept: INTERNAL MEDICINE | Facility: CLINIC | Age: 78
End: 2018-12-12

## 2018-12-12 VITALS
HEART RATE: 68 BPM | TEMPERATURE: 98.2 F | BODY MASS INDEX: 26.35 KG/M2 | SYSTOLIC BLOOD PRESSURE: 124 MMHG | DIASTOLIC BLOOD PRESSURE: 60 MMHG | OXYGEN SATURATION: 97 % | WEIGHT: 177.9 LBS | HEIGHT: 69 IN

## 2018-12-12 DIAGNOSIS — J20.9 ACUTE BRONCHITIS, UNSPECIFIED ORGANISM: Primary | ICD-10-CM

## 2018-12-12 PROCEDURE — 99213 OFFICE O/P EST LOW 20 MIN: CPT | Performed by: FAMILY MEDICINE

## 2018-12-12 RX ORDER — ALBUTEROL SULFATE 90 UG/1
2 AEROSOL, METERED RESPIRATORY (INHALATION) EVERY 4 HOURS PRN
Qty: 1 INHALER | Refills: 0 | Status: SHIPPED | OUTPATIENT
Start: 2018-12-12 | End: 2019-01-03

## 2018-12-12 RX ORDER — AZITHROMYCIN 250 MG/1
TABLET, FILM COATED ORAL
Qty: 6 TABLET | Refills: 0 | Status: SHIPPED | OUTPATIENT
Start: 2018-12-12 | End: 2019-01-03

## 2018-12-12 RX ORDER — GUAIFENESIN AND CODEINE PHOSPHATE 100; 10 MG/5ML; MG/5ML
5 SOLUTION ORAL 3 TIMES DAILY PRN
Qty: 150 ML | Refills: 0 | Status: SHIPPED | OUTPATIENT
Start: 2018-12-12 | End: 2019-01-03

## 2019-01-03 ENCOUNTER — HOSPITAL ENCOUNTER (OUTPATIENT)
Dept: GENERAL RADIOLOGY | Facility: HOSPITAL | Age: 79
Discharge: HOME | End: 2019-01-03
Admitting: INTERNAL MEDICINE

## 2019-01-03 ENCOUNTER — OFFICE VISIT (OUTPATIENT)
Dept: INTERNAL MEDICINE | Facility: CLINIC | Age: 79
End: 2019-01-03

## 2019-01-03 VITALS
OXYGEN SATURATION: 98 % | HEIGHT: 69 IN | WEIGHT: 179 LBS | BODY MASS INDEX: 26.51 KG/M2 | HEART RATE: 71 BPM | DIASTOLIC BLOOD PRESSURE: 64 MMHG | SYSTOLIC BLOOD PRESSURE: 121 MMHG

## 2019-01-03 DIAGNOSIS — R05.3 PERSISTENT COUGH: ICD-10-CM

## 2019-01-03 DIAGNOSIS — J20.9 ACUTE BRONCHITIS WITH BRONCHOSPASM: Primary | ICD-10-CM

## 2019-01-03 PROCEDURE — 99214 OFFICE O/P EST MOD 30 MIN: CPT | Performed by: INTERNAL MEDICINE

## 2019-01-03 PROCEDURE — 71046 X-RAY EXAM CHEST 2 VIEWS: CPT

## 2019-01-03 RX ORDER — AZITHROMYCIN 500 MG/1
TABLET, FILM COATED ORAL
Qty: 7 TABLET | Refills: 0 | Status: SHIPPED | OUTPATIENT
Start: 2019-01-03 | End: 2019-02-13

## 2019-01-03 RX ORDER — TRIAMCINOLONE ACETONIDE 1 MG/G
CREAM TOPICAL
Refills: 0 | COMMUNITY
Start: 2018-12-13 | End: 2019-08-10

## 2019-01-03 RX ORDER — PREDNISONE 10 MG/1
TABLET ORAL
Qty: 46 TABLET | Refills: 0 | Status: SHIPPED | OUTPATIENT
Start: 2019-01-03 | End: 2019-02-13

## 2019-01-03 NOTE — PROGRESS NOTES
01/03/2019    Patient Information  Seregy Mendoza                                                                                          [address]  [facility] [address]      1940  [unfilled]  There is no work phone number on file.    Chief Complaint:     Complaining of continued chest congestion and cough.    History of Present Illness:    Patient with a history of hypertension, BPH, carotid artery plaque, coronary artery disease, esophageal reflux, hyperlipidemia, osteoarthritis.  He presents today with complaints of chest congestion and cough as described below.  Past medical history reviewed and updated where necessary including health maintenance parameters.  This reveals he is up-to-date or else accounted for with the exception of the hepatitis A vaccination and the shingles vaccination.  We discussed that he can get this at the local pharmacy after he is over his current illness.    The history regarding chronic cough and acute bronchitis with bronchospasm:    01/03/2019--patient presents with at least a one-month history of chest congestion, cough, and occasional wheezing associated with fatigue, no fever, chills, or other systemic signs or symptoms.  Patient reports he saw one of my colleagues a couple of weeks ago.  Review of the chart reveals he was treated with a Z-Home, albuterol inhaler, and Anoro inhaler.  This seemed to help somewhat and patient is better but his symptoms have not totally resolved.  Chest x-ray was not performed.  On exam today patient has clear lungs except there are scattered rhonchi and wheezes on the right posterior laterally.  No definite consolidation.  Oxygen saturation 98%.  Given the duration of the symptoms and acknowledging patient is somewhat better, I still think he needs further treatment.  Plan is as follows: Zithromax 500 mg by mouth daily ×7 days.  Prednisone 50 mg by mouth daily ×5 days, taper and discontinue.  Chest x-ray PA and lateral.   Patient will follow-up on the phone for the results and possible further instructions.    Review of Systems   Constitution: Positive for malaise/fatigue. Negative for chills and fever.   HENT: Negative.    Eyes: Negative.    Cardiovascular: Negative.    Respiratory: Positive for cough, shortness of breath, sputum production and wheezing.    Endocrine: Negative.    Hematologic/Lymphatic: Negative.    Skin: Negative.    Musculoskeletal: Negative.    Gastrointestinal: Negative.    Genitourinary: Negative.    Neurological: Negative.    Psychiatric/Behavioral: Negative.    Allergic/Immunologic: Negative.        Active Problems:    Patient Active Problem List   Diagnosis   • Benign essential hypertension   • Benign prostatic hypertrophy, 07/07/2008--negative biopsy.   • Carotid artery plaque, 08/11/2016--mild bilateral carotid artery plaque.   • Occlusive coronary artery disease, 10/08/2010--anterior MI.  EF 40%.  PTCA with drug-eluting stent proximal LAD.  10% LM.  100% diagonal 1.  30% diffuse circumflex.  30% diffuse RCA.   • Diverticulosis of colon   • Gastroesophageal reflux disease without esophagitis   • Generalized osteoarthritis of multiple sites   • Hyperlipidemia   • History of myocardial infarction, 10/08/2010--patient presented with an anterior myocardial infarction.   • Primary osteoarthritis of right knee   • History of PTCA, 10/08/2010--anterior MI.  EF 40%.  PTCA with drug-eluting stent proximal LAD.  10% LM.  100% diagonal 1.  30% diffuse circumflex.  30% diffuse RCA.  2011--EF 64%   • Vitamin D deficiency   • Elevated PSA   • Internal hemorrhoids   • Therapeutic drug monitoring   • Splenomegaly   • Multiple actinic keratoses   • Asymptomatic cholelithiasis   • Microscopic hematuria   • [employer] replacement physical exam   • Acute bronchitis with bronchospasm   • Persistent cough         Past Medical History:   Diagnosis Date   • Acute bronchitis 12/12/2018   • Asymptomatic cholelithiasis  2/19/2018   • Benign essential hypertension 3/24/2006    Diagnosed approximately March 2006.   • Benign prostatic hypertrophy, 07/07/2008--negative biopsy. 7/7/2008    Patient had a history of elevated PSA and found to have benign prostatic hypertrophy.   07/07/2008--negative prostate biopsy.   • Carotid artery plaque, 08/11/2016--mild bilateral carotid artery plaque. 4/21/2009 08/11/2016--carotid Doppler study reveals mild bilateral carotid plaque.  05/28/2014--vascular screen reveals mild bilateral carotid plaque, negative for AAA, negative for PAD.   03/30/2011--vascular screen revealed mild bilateral carotid plaque, negative for abdominal aneurysm, negative for PAD.   04/21/2009--vascular screen was negative for carotid plaque.   • Diverticulosis of colon 1/9/2012 01/09/2012--normal colonoscopy except for diffuse diverticulosis.   2004--colonoscopy normal.   • Elevated PSA 11/6/2013 02/13/2017--patient seen in follow-up and reports he is followed on a yearly basis by the urologist.  Last follow-up was in December 2016 and his PSA was less than 5.  07/28/2016--PSA elevated at 4.88.  03/19/2014--he was seen in followup and his PSA was back down to 3.9. Patient was not having any urinary symptoms.  12/11/2013--patient was evaluated by the urologist and he elected to just observe the PSA.   11/06/2013--patient seen in followup in his PSA returned even higher at 5.7. He was referred back to urology.   11/06/2013--PSA returned elevated at 4.3. Patient did have some urinary symptoms consisting of periods of weak stream and dribbling. He had never been treated for chronic prostatitis. I treated him with ciprofloxacin 500 mg for 30 days.       • Gastroesophageal reflux disease without esophagitis 3/24/2016   • Generalized osteoarthritis of multiple sites 3/24/2016   • History of cardiac catheterization 10/8/2010    10/08/2010--patient presented with an anterior myocardial infarction. Heart catheterization  revealed anterior/apical hypokinesis with 40% ejection fraction. Severe 80% ostial LAD stenosis. Mild 10% left main. Mild diffuse LAD. Totally occluded diagonal one. 30% diffuse circumflex. Mild 30% percent diffuse RCA. Angioplasty with drug eluding stent performed to the proximal LAD.    • History of Chronic Duodenitis 1/9/2012 01/09/2012--EGD revealed mild distal erythema of the stomach and proximal duodenum.   • History of closed Colles' fracture of left radius 05[birth year]05/1953    13 years of age.   • History of iron deficiency anemia 6/23/2012 02/07/2017--hemoglobin normal at 14.2, hematocrit normal at 45.1.  Serum iron normal at 105.  Iron saturation normal at 30%.  Resolve this issue.  08/02/2016--patient seen in follow-up.  White count is low at 3.66.  Hemoglobin low at 13.3.  Hematocrit normal at 41.8.  Platelets low at 109.  Homocystine and methylmalonic acid are normal.  Serum iron normal at 98.  Iron saturation normal at 28.  Patient is taking iron sulfate 325 mg daily.  10/13/2013--CBC was entirely normal, anemia resolved we'll continue to monitor.  06/23/2012--patient treated for iron deficiency anemia caused by duodenitis which was revealed per EGD 01/09/2012.    • History of myocardial infarction, 10/08/2010--patient presented with an anterior myocardial infarction. 10/8/2010    Stress test 02/23/2011--mild anterior wall ischemia, ejection fraction improved to 64%.  10/08/2010--patient presented with an anterior myocardial infarction. Heart catheterization revealed anterior/apical hypokinesis with 40% ejection fraction. Severe 80% ostial LAD stenosis. Mild 10% left main. Mild diffuse LAD. Totally occluded diagonal one. 30% diffuse circumflex. Mild 30% percent diffuse RCA. Angioplasty with drug eluding stent performed to the proximal LAD.    • History of PTCA, 10/08/2010--anterior MI.  EF 40%.  PTCA with drug-eluting stent proximal LAD.  10% LM.  100% diagonal 1.  30% diffuse circumflex.  30% diffuse  RCA.  2011--EF 64% 10/8/2010    Stress test 02/23/2011--mild anterior wall ischemia, ejection fraction improved to 64%.  10/08/2010--patient presented with an anterior myocardial infarction. Heart catheterization revealed anterior/apical hypokinesis with 40% ejection fraction. Severe 80% ostial LAD stenosis. Mild 10% left main. Mild diffuse LAD. Totally occluded diagonal one. 30% diffuse circumflex. Mild 30% percent diffuse RCA. Angioplasty with drug eluding stent performed to the proximal LAD.    • History of Tear of medial meniscus of right knee 10/17/2014    12/09/2014--patient was evaluated by the orthopedist. He felt that patient also had end stage osteoarthritis the right knee and will eventually need a total knee replacement. In the meantime he gave a cortisone injection in the knee and patient reports this did help. Patient also went to physical therapy which seemed to help somewhat also.   10/29/2014--MRI of the right knee reveals tricompartmental osteoarthritis that is severe in the medial compartment. There is a degenerative tear of the medial meniscus. There is a joint effusion with Baker's cyst. Intramuscular extension of the Baker's cyst into the medial head of the gastrocnemius muscle. Patient referred to orthopedics.   10/17/2014--x-ray of the right knee reveals a small suprapatellar effusion. Narrowing of the medial compartment. There is patellar spur formation. No fracture, dislocation, bone lesion is demonstrated.   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 He was trying to sit down and was in an awkward position.    • Hyperlipidemia 3/6/2011    03/16/2011--treatment for hyperlipidemia and initiated.   • Internal hemorrhoids 3/24/2016   • Microscopic hematuria 2/19/2018 12/20/2017--patient was evaluated by the urologist for a routine follow-up elevated PSA.  He was noted to have microscopic hematuria with 3-5 RBCs.  Cystoscopy was reportedly negative.  Patient also  had a CT scan of the abdomen and pelvis which revealed asymptomatic cholelithiasis, diffuse atherosclerosis, and enlarged prostate.   • Multiple actinic keratoses 8/2/2016 08/02/2016--patient presents with multiple actinic keratoses as well as seborrheic keratoses.  Dermatology referral given.   • Occlusive coronary artery disease, 10/08/2010--anterior MI.  EF 40%.  PTCA with drug-eluting stent proximal LAD.  10% LM.  100% diagonal 1.  30% diffuse circumflex.  30% diffuse RCA. 10/8/2010    02/23/2011--stress Cardiolite reveals mild anterior wall ischemia, ejection fraction improved to 64%.  10/08/2010--patient presented with an anterior myocardial infarction. Heart catheterization revealed anterior/apical hypokinesis with 40% ejection fraction. Severe 80% ostial LAD stenosis. Mild 10% left main. Mild diffuse LAD. Totally occluded diagonal one. 30% diffuse circumflex. Mild 30% percent diffuse RCA. Angioplasty with drug eluding stent performed to the proximal LAD.   10/08/2010--anterior MI.  EF 40%.  PTCA with drug-eluting stent proximal LAD.  10% LM.  100% diagonal 1.  30% diffuse circumflex.  30% diffuse RCA.   • Primary osteoarthritis of right knee 10/17/2014    12/09/2014--patient was evaluated by the orthopedist. He felt that patient also had end stage osteoarthritis the right knee and will eventually need a total knee replacement. In the meantime he gave a cortisone injection in the knee and patient reports this did help. Patient also went to physical therapy which seemed to help somewhat also.  10/29/2014--MRI of the right knee reveals tricompartmental osteoarthritis that is severe in the medial compartment. There is a degenerative tear of the medial meniscus. There is a joint effusion with Baker's cyst. Intramuscular extension of the Baker's cyst into the medial head of the gastrocnemius muscle. Patient referred to orthopedics.   10/17/2014--x-ray of the right knee reveals a small suprapatellar effusion.  Narrowing of the medial compartment. There is patellar spur formation. No fracture, dislocation, bone lesion is demonstrated.   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 He was trying to sit down and was in an awkward position. T   • Splenomegaly 8/2/2016 09/06/2013--CT scan reveals mild splenomegaly.  Although not in the report, the radiologist did compare the CT scan from 09/06/2013 to the one from 2010.  The spleen actually looks smaller in size, 13.5 cm compared to 15 cm previously.  In another direction, spleen is 20 cm as compared to 24 cm.  The last dimension was the maximum length.  08/04/2010--initial evaluation by the hematologist. WBCs 3.5. Differential normal. Absolute neutrophil count 2500. Hemoglobin normal at 14.2. MCV low at 83. Platelet count low at 125. IPF normal at 4.6%. Review of old labs through 1997 revealed a white blood cell count has fluctuated between 2.8 and 3.9. There is no trend downward but it is fluctuating. Platelet count has ranged between 148 and 198. MCV has been low. Liver spleen scan revealed mild splenomegaly. B12, folic acid, iron studies, rheumatoid factor, SPEP have all been normal. Felt to be mild pancytopenia secondary to splenomegaly. Patient is followed on a regular basis by the hematologist.   • Vitamin D deficiency 3/24/2016         Past Surgical History:   Procedure Laterality Date   • CARDIAC CATHETERIZATION  10/08/2010    See past medical history   • COLONOSCOPY  01/09/2012 01/09/2012--normal colonoscopy except for diffuse diverticulosis.    • COLONOSCOPY  2004 2004--colonoscopy normal.   • CORONARY ANGIOPLASTY WITH STENT PLACEMENT  10/10/2010    10/10/2010--critical 80% proximal LAD stenosis treated using a drug-coated stent. Unable to cross diagonal one.   • ESOPHAGOSCOPY / EGD  01/09/2012 01/09/2012--EGD performed for iron deficiency anemia revealed mild erythema of the distal stomach and proximal duodenum consistent with  duodenitis.   • PROSTATE BIOPSY  07/07/2008 07/07/2008--negative prostate biopsy.  Patient had a history of elevated PSA and found to have benign prostatic hypertrophy.          Allergies   Allergen Reactions   • Penicillins    • Sulfa Antibiotics            Current Outpatient Medications:   •  aspirin 81 MG tablet, Take 81 mg by mouth Daily., Disp: , Rfl:   •  Coenzyme Q10 (COQ10) 400 MG capsule, Take 1 capsule by mouth daily. Take with a meal., Disp: , Rfl:   •  ezetimibe (ZETIA) 10 MG tablet, 1 by mouth daily for cholesterol, Disp: 90 tablet, Rfl: 3  •  metoprolol succinate XL (TOPROL-XL) 50 MG 24 hr tablet, TAKE 1 TABLET EVERY DAY, Disp: 90 tablet, Rfl: 0  •  simvastatin (ZOCOR) 40 MG tablet, TAKE 1 TABLET BY MOUTH EVERY NIGHT., Disp: 90 tablet, Rfl: 1  •  triamcinolone (KENALOG) 0.1 % cream, APPLY TO AFFECTED AREA TWICE A DAY AS NEEDED, Disp: , Rfl: 0      Family History   Problem Relation Age of Onset   • Coronary artery disease Mother    • Stroke Mother    • No Known Problems Father    • Cancer Maternal Grandmother    • Heart attack Maternal Grandfather    • Heart disease Maternal Grandfather    • No Known Problems Paternal Grandmother    • No Known Problems Paternal Grandfather          Social History     Socioeconomic History   • Marital status:      Spouse name: Not on file   • Number of children: Not on file   • Years of education: Not on file   • Highest education level: Not on file   Social Needs   • Financial resource strain: Not hard at all   • Food insecurity - worry: Patient refused   • Food insecurity - inability: Patient refused   • Transportation needs - medical: Patient refused   • Transportation needs - non-medical: Patient refused   Occupational History   • Occupation: Retired - [employer]   Tobacco Use   • Smoking status: Former Smoker   • Smokeless tobacco: Never Used   • Tobacco comment: Stopped smoking in his early 20s.   Substance and Sexual Activity   • Alcohol use:  "Yes     Drinks per session: 1 or 2     Binge frequency: Never     Comment: Socially   • Drug use: No   • Sexual activity: Yes     Partners: Female   Other Topics Concern   • Not on file   Social History Narrative   • Not on file         Vitals:    01/03/19 0901   BP: 121/64   Pulse: 71   SpO2: 98%   Weight: 81.2 kg (179 lb)   Height: 175.3 cm (69.02\")          Physical Exam:    General: Alert and oriented x 3.  No acute distress.  Normal affect.  HEENT: Pupils equal, round, reactive to light; extraocular movements intact; sclerae nonicteric; pharynx, ear canals and TMs normal.  Neck: Without JVD, thyromegaly, bruit, or adenopathy.  Lungs: Mild scattered rhonchi and occasional wheeze noted except there are definite wheezes and rhonchi on the right posterior laterally.  Heart: Regular rate and rhythm without murmur, rub, gallop, or click.  Abdomen: Soft, nontender, without hepatosplenomegaly or hernia.  Bowel sounds normal.  : Deferred.  Rectal: Deferred.  Extremities: Without clubbing, cyanosis, edema, or pulse deficit.  Neurologic: Intact without focal deficit.  Normal station and gait observed during ingress and egress from the examination room.  Skin: Without significant lesion.  Musculoskeletal: Unremarkable.      Lab/other results:      Assessment/Plan:     Diagnosis Plan   1. Acute bronchitis with bronchospasm     2. Persistent cough       Patient presents with a history and exam consistent with acute bronchitis with bronchospasm although his symptoms have been present now for about a month.  Low-grade pneumonia certainly a consideration.  Patient is somewhat better but given the fact that his symptoms persist after 1 month, I think re-treatment is indicated.    Plan is as follows: Zithromax 500 mg by mouth daily ×7 days.  Prednisone 50 mg by mouth daily ×5 days, taper and discontinue.  Chest x-ray PA and lateral.  Patient will follow-up on the phone for the results and possible further instructions.  He " should contact me if his symptoms persist.  Otherwise, he will follow-up in February as previously planned.        Procedures

## 2019-01-08 DIAGNOSIS — J20.9 ACUTE BRONCHITIS, UNSPECIFIED ORGANISM: ICD-10-CM

## 2019-01-08 RX ORDER — ALBUTEROL SULFATE 90 UG/1
AEROSOL, METERED RESPIRATORY (INHALATION)
Qty: 18 INHALER | Refills: 0 | Status: SHIPPED | OUTPATIENT
Start: 2019-01-08 | End: 2019-02-13

## 2019-02-13 ENCOUNTER — OFFICE VISIT (OUTPATIENT)
Dept: CARDIOLOGY | Facility: CLINIC | Age: 79
End: 2019-02-13

## 2019-02-13 VITALS
OXYGEN SATURATION: 98 % | DIASTOLIC BLOOD PRESSURE: 74 MMHG | WEIGHT: 178 LBS | BODY MASS INDEX: 26.36 KG/M2 | SYSTOLIC BLOOD PRESSURE: 140 MMHG | HEART RATE: 64 BPM | HEIGHT: 69 IN

## 2019-02-13 DIAGNOSIS — I25.10 OCCLUSIVE CORONARY ARTERY DISEASE: Primary | Chronic | ICD-10-CM

## 2019-02-13 PROCEDURE — 93000 ELECTROCARDIOGRAM COMPLETE: CPT | Performed by: PHYSICIAN ASSISTANT

## 2019-02-13 PROCEDURE — 99213 OFFICE O/P EST LOW 20 MIN: CPT | Performed by: PHYSICIAN ASSISTANT

## 2019-02-13 NOTE — PROGRESS NOTES
Date of Office Visit: 2019  Encounter Provider: LEBRON Hernandez  Place of Service: [facility] CARDIOLOGY  Patient Name: Sergey Mendoza  :1940    Chief Complaint   Patient presents with   • Coronary Artery Disease     1 year follow up   :     HPI: Sergey Mendoza is a 78 y.o. male who presents today for follow-up.  Old records have been obtained and reviewed by me.  He is a patient of Dr. Perez with a past cardiac history significant for coronary artery disease.  He is status post stenting to his LAD.  He had a little bit of cardiomyopathy, however revascularization but his LV function back to normal.  He was last in our office to see Dr. Manning on 2018.  At that visit he was doing well without complaints of angina or heart failure.  No changes were made to his medical regimen, and is here today for yearly follow-up.   Past year he has been doing well.  He denies any chest pain, shortness of breath, palpitations, edema, dizziness, or syncope.  He walks for about 30 minutes twice a week.  He eats a mostly healthy diet however he could really cut back on sugars and processed foods.  His primary care physician monitors his cholesterol for him, he will be seeing him next week.      Past Medical History:   Diagnosis Date   • Acute bronchitis 2018   • Asymptomatic cholelithiasis 2018   • Benign essential hypertension 3/24/2006    Diagnosed approximately 2006.   • Benign prostatic hypertrophy, 2008--negative biopsy. 2008    Patient had a history of elevated PSA and found to have benign prostatic hypertrophy.   2008--negative prostate biopsy.   • Carotid artery plaque, 2016--mild bilateral carotid artery plaque. 2016--carotid Doppler study reveals mild bilateral carotid plaque.  2014--vascular screen reveals mild bilateral carotid plaque, negative for AAA, negative for PAD.   2011--vascular screen revealed mild  bilateral carotid plaque, negative for abdominal aneurysm, negative for PAD.   04/21/2009--vascular screen was negative for carotid plaque.   • Diverticulosis of colon 1/9/2012 01/09/2012--normal colonoscopy except for diffuse diverticulosis.   2004--colonoscopy normal.   • Elevated PSA 11/6/2013 02/13/2017--patient seen in follow-up and reports he is followed on a yearly basis by the urologist.  Last follow-up was in December 2016 and his PSA was less than 5.  07/28/2016--PSA elevated at 4.88.  03/19/2014--he was seen in followup and his PSA was back down to 3.9. Patient was not having any urinary symptoms.  12/11/2013--patient was evaluated by the urologist and he elected to just observe the PSA.   11/06/2013--patient seen in followup in his PSA returned even higher at 5.7. He was referred back to urology.   11/06/2013--PSA returned elevated at 4.3. Patient did have some urinary symptoms consisting of periods of weak stream and dribbling. He had never been treated for chronic prostatitis. I treated him with ciprofloxacin 500 mg for 30 days.       • Gastroesophageal reflux disease without esophagitis 3/24/2016   • Generalized osteoarthritis of multiple sites 3/24/2016   • History of cardiac catheterization 10/8/2010    10/08/2010--patient presented with an anterior myocardial infarction. Heart catheterization revealed anterior/apical hypokinesis with 40% ejection fraction. Severe 80% ostial LAD stenosis. Mild 10% left main. Mild diffuse LAD. Totally occluded diagonal one. 30% diffuse circumflex. Mild 30% percent diffuse RCA. Angioplasty with drug eluding stent performed to the proximal LAD.    • History of Chronic Duodenitis 1/9/2012 01/09/2012--EGD revealed mild distal erythema of the stomach and proximal duodenum.   • History of closed Colles' fracture of left radius 05/05/1953    13 years of age.   • History of iron deficiency anemia 6/23/2012 02/07/2017--hemoglobin normal at 14.2, hematocrit normal  at 45.1.  Serum iron normal at 105.  Iron saturation normal at 30%.  Resolve this issue.  08/02/2016--patient seen in follow-up.  White count is low at 3.66.  Hemoglobin low at 13.3.  Hematocrit normal at 41.8.  Platelets low at 109.  Homocystine and methylmalonic acid are normal.  Serum iron normal at 98.  Iron saturation normal at 28.  Patient is taking iron sulfate 325 mg daily.  10/13/2013--CBC was entirely normal, anemia resolved we'll continue to monitor.  06/23/2012--patient treated for iron deficiency anemia caused by duodenitis which was revealed per EGD 01/09/2012.    • History of myocardial infarction, 10/08/2010--patient presented with an anterior myocardial infarction. 10/8/2010    Stress test 02/23/2011--mild anterior wall ischemia, ejection fraction improved to 64%.  10/08/2010--patient presented with an anterior myocardial infarction. Heart catheterization revealed anterior/apical hypokinesis with 40% ejection fraction. Severe 80% ostial LAD stenosis. Mild 10% left main. Mild diffuse LAD. Totally occluded diagonal one. 30% diffuse circumflex. Mild 30% percent diffuse RCA. Angioplasty with drug eluding stent performed to the proximal LAD.    • History of PTCA, 10/08/2010--anterior MI.  EF 40%.  PTCA with drug-eluting stent proximal LAD.  10% LM.  100% diagonal 1.  30% diffuse circumflex.  30% diffuse RCA.  2011--EF 64% 10/8/2010    Stress test 02/23/2011--mild anterior wall ischemia, ejection fraction improved to 64%.  10/08/2010--patient presented with an anterior myocardial infarction. Heart catheterization revealed anterior/apical hypokinesis with 40% ejection fraction. Severe 80% ostial LAD stenosis. Mild 10% left main. Mild diffuse LAD. Totally occluded diagonal one. 30% diffuse circumflex. Mild 30% percent diffuse RCA. Angioplasty with drug eluding stent performed to the proximal LAD.    • History of Tear of medial meniscus of right knee 10/17/2014    12/09/2014--patient was evaluated by the  orthopedist. He felt that patient also had end stage osteoarthritis the right knee and will eventually need a total knee replacement. In the meantime he gave a cortisone injection in the knee and patient reports this did help. Patient also went to physical therapy which seemed to help somewhat also.   10/29/2014--MRI of the right knee reveals tricompartmental osteoarthritis that is severe in the medial compartment. There is a degenerative tear of the medial meniscus. There is a joint effusion with Baker's cyst. Intramuscular extension of the Baker's cyst into the medial head of the gastrocnemius muscle. Patient referred to orthopedics.   10/17/2014--x-ray of the right knee reveals a small suprapatellar effusion. Narrowing of the medial compartment. There is patellar spur formation. No fracture, dislocation, bone lesion is demonstrated.   10/17/2014--patient was on tour bus in [place] this past October 2013. He was trying to sit down and was in an awkward position.    • Hyperlipidemia 3/6/2011    03/16/2011--treatment for hyperlipidemia and initiated.   • Internal hemorrhoids 3/24/2016   • Microscopic hematuria 2/19/2018 12/20/2017--patient was evaluated by the urologist for a routine follow-up elevated PSA.  He was noted to have microscopic hematuria with 3-5 RBCs.  Cystoscopy was reportedly negative.  Patient also had a CT scan of the abdomen and pelvis which revealed asymptomatic cholelithiasis, diffuse atherosclerosis, and enlarged prostate.   • Multiple actinic keratoses 8/2/2016 08/02/2016--patient presents with multiple actinic keratoses as well as seborrheic keratoses.  Dermatology referral given.   • Occlusive coronary artery disease, 10/08/2010--anterior MI.  EF 40%.  PTCA with drug-eluting stent proximal LAD.  10% LM.  100% diagonal 1.  30% diffuse circumflex.  30% diffuse RCA. 10/8/2010    02/23/2011--stress Cardiolite reveals mild anterior wall ischemia, ejection fraction improved to 64%.   10/08/2010--patient presented with an anterior myocardial infarction. Heart catheterization revealed anterior/apical hypokinesis with 40% ejection fraction. Severe 80% ostial LAD stenosis. Mild 10% left main. Mild diffuse LAD. Totally occluded diagonal one. 30% diffuse circumflex. Mild 30% percent diffuse RCA. Angioplasty with drug eluding stent performed to the proximal LAD.   10/08/2010--anterior MI.  EF 40%.  PTCA with drug-eluting stent proximal LAD.  10% LM.  100% diagonal 1.  30% diffuse circumflex.  30% diffuse RCA.   • Primary osteoarthritis of right knee 10/17/2014    12/09/2014--patient was evaluated by the orthopedist. He felt that patient also had end stage osteoarthritis the right knee and will eventually need a total knee replacement. In the meantime he gave a cortisone injection in the knee and patient reports this did help. Patient also went to physical therapy which seemed to help somewhat also.  10/29/2014--MRI of the right knee reveals tricompartmental osteoarthritis that is severe in the medial compartment. There is a degenerative tear of the medial meniscus. There is a joint effusion with Baker's cyst. Intramuscular extension of the Baker's cyst into the medial head of the gastrocnemius muscle. Patient referred to orthopedics.   10/17/2014--x-ray of the right knee reveals a small suprapatellar effusion. Narrowing of the medial compartment. There is patellar spur formation. No fracture, dislocation, bone lesion is demonstrated.   10/17/2014--patient was on tour bus in [place] this past October 2013. He was trying to sit down and was in an awkward position. T   • Splenomegaly 8/2/2016 09/06/2013--CT scan reveals mild splenomegaly.  Although not in the report, the radiologist did compare the CT scan from 09/06/2013 to the one from 2010.  The spleen actually looks smaller in size, 13.5 cm compared to 15 cm previously.  In another direction, spleen is 20 cm as compared to 24 cm.  The last dimension  was the maximum length.  08/04/2010--initial evaluation by the hematologist. WBCs 3.5. Differential normal. Absolute neutrophil count 2500. Hemoglobin normal at 14.2. MCV low at 83. Platelet count low at 125. IPF normal at 4.6%. Review of old labs through 1997 revealed a white blood cell count has fluctuated between 2.8 and 3.9. There is no trend downward but it is fluctuating. Platelet count has ranged between 148 and 198. MCV has been low. Liver spleen scan revealed mild splenomegaly. B12, folic acid, iron studies, rheumatoid factor, SPEP have all been normal. Felt to be mild pancytopenia secondary to splenomegaly. Patient is followed on a regular basis by the hematologist.   • Vitamin D deficiency 3/24/2016       Past Surgical History:   Procedure Laterality Date   • CARDIAC CATHETERIZATION  10/08/2010    See past medical history   • COLONOSCOPY  01/09/2012 01/09/2012--normal colonoscopy except for diffuse diverticulosis.    • COLONOSCOPY  2004 2004--colonoscopy normal.   • CORONARY ANGIOPLASTY WITH STENT PLACEMENT  10/10/2010    10/10/2010--critical 80% proximal LAD stenosis treated using a drug-coated stent. Unable to cross diagonal one.   • ESOPHAGOSCOPY / EGD  01/09/2012 01/09/2012--EGD performed for iron deficiency anemia revealed mild erythema of the distal stomach and proximal duodenum consistent with duodenitis.   • PROSTATE BIOPSY  07/07/2008 07/07/2008--negative prostate biopsy.  Patient had a history of elevated PSA and found to have benign prostatic hypertrophy.        Social History     Socioeconomic History   • Marital status:      Spouse name: Not on file   • Number of children: Not on file   • Years of education: Not on file   • Highest education level: Not on file   Social Needs   • Financial resource strain: Not hard at all   • Food insecurity - worry: Patient refused   • Food insecurity - inability: Patient refused   • Transportation needs - medical: Patient refused   •  Transportation needs - non-medical: Patient refused   Occupational History   • Occupation: Retired - Sales and [employer]   Tobacco Use   • Smoking status: Former Smoker   • Smokeless tobacco: Never Used   • Tobacco comment: Stopped smoking in his early 20s.   Substance and Sexual Activity   • Alcohol use: Yes     Alcohol/week: 0.6 oz     Types: 1 Glasses of wine per week     Drinks per session: 1 or 2     Binge frequency: Never     Comment: Socially   • Drug use: No   • Sexual activity: Yes     Partners: Female   Other Topics Concern   • Not on file   Social History Narrative   • Not on file       Family History   Problem Relation Age of Onset   • Coronary artery disease Mother    • Stroke Mother    • No Known Problems Father    • Cancer Maternal Grandmother    • Heart attack Maternal Grandfather    • Heart disease Maternal Grandfather    • No Known Problems Paternal Grandmother    • No Known Problems Paternal Grandfather        Review of Systems   Constitution: Negative for chills, fever and malaise/fatigue.   Cardiovascular: Negative for chest pain, dyspnea on exertion, leg swelling, near-syncope, orthopnea, palpitations, paroxysmal nocturnal dyspnea and syncope.   Respiratory: Negative for cough and shortness of breath.    Musculoskeletal: Negative for joint pain, joint swelling and myalgias.   Gastrointestinal: Negative for abdominal pain, diarrhea, melena, nausea and vomiting.   Genitourinary: Negative for frequency and hematuria.   Neurological: Negative for light-headedness, numbness, paresthesias and seizures.   Allergic/Immunologic: Negative.    All other systems reviewed and are negative.      Allergies   Allergen Reactions   • Penicillins    • Sulfa Antibiotics          Current Outpatient Medications:   •  aspirin 81 MG tablet, Take 81 mg by mouth Daily., Disp: , Rfl:   •  Coenzyme Q10 (COQ10) 400 MG capsule, Take 1 capsule by mouth daily. Take with a meal., Disp: , Rfl:   •  ezetimibe (ZETIA) 10 MG  "tablet, 1 by mouth daily for cholesterol, Disp: 90 tablet, Rfl: 3  •  metoprolol succinate XL (TOPROL-XL) 50 MG 24 hr tablet, TAKE 1 TABLET EVERY DAY, Disp: 90 tablet, Rfl: 0  •  simvastatin (ZOCOR) 40 MG tablet, TAKE 1 TABLET BY MOUTH EVERY NIGHT., Disp: 90 tablet, Rfl: 1  •  triamcinolone (KENALOG) 0.1 % cream, APPLY TO AFFECTED AREA TWICE A DAY AS NEEDED, Disp: , Rfl: 0      Objective:     Vitals:    02/13/19 1003 02/13/19 1009   BP: 130/70 140/74   BP Location: Right arm Left arm   Pulse: 64    SpO2: 98%    Weight: 80.7 kg (178 lb)    Height: 175.3 cm (69\")      Body mass index is 26.29 kg/m².    PHYSICAL EXAM:    Physical Exam   Constitutional: He is oriented to person, place, and time. He appears well-developed and well-nourished. No distress.   HENT:   Head: Normocephalic and atraumatic.   Eyes: Pupils are equal, round, and reactive to light.   Neck: No JVD present. No thyromegaly present.   Cardiovascular: Normal rate, regular rhythm, normal heart sounds and intact distal pulses.   No murmur heard.  Pulmonary/Chest: Effort normal and breath sounds normal. No respiratory distress.   Abdominal: Soft. Bowel sounds are normal. He exhibits no distension. There is no splenomegaly or hepatomegaly. There is no tenderness.   Musculoskeletal: Normal range of motion. He exhibits no edema.   Neurological: He is alert and oriented to person, place, and time.   Skin: Skin is warm and dry. He is not diaphoretic. No erythema.   Psychiatric: He has a normal mood and affect. His behavior is normal. Judgment normal.         ECG 12 Lead  Date/Time: 2/13/2019 10:20 AM  Performed by: Norma Smith PA  Authorized by: Norma Smith PA   Comparison: compared with previous ECG from 2/12/2018  Similar to previous ECG  Rhythm: sinus rhythm  BPM: 62    Clinical impression: normal ECG  Comments: Indication: Coronary disease              Assessment:       Diagnosis Plan   1. Occlusive coronary artery disease, 10/08/2010--anterior " MI.  EF 40%.  PTCA with drug-eluting stent proximal LAD.  10% LM.  100% diagonal 1.  30% diffuse circumflex.  30% diffuse RCA.  ECG 12 Lead     Orders Placed This Encounter   Procedures   • ECG 12 Lead     This order was created via procedure documentation          Plan:       Coronary Artery Disease  Assessment  • The patient has no angina    Plan  • Lifestyle modifications discussed include adhering to a heart healthy diet, regular exercise and regular monitoring of cholesterol and blood pressure    Subjective - Objective  • There has been a previous stent procedure using GLORIA  • Current antiplatelet therapy includes aspirin 81 mg  • Overall he stable and doing well.  He has no completes of angina or heart failure.  I asked him to get a little more exercise and to really cut back on the processed foods and sugars.  Otherwise I am not going to make any changes to his medical regimen, and he will follow-up with Dr. Manning in 1 year or sooner if needed.      As always, it has been a pleasure to participate in your patient's care.      Sincerely,         Norma Smith PA-C

## 2019-02-15 DIAGNOSIS — I10 BENIGN ESSENTIAL HYPERTENSION: Chronic | ICD-10-CM

## 2019-02-15 DIAGNOSIS — E55.9 VITAMIN D DEFICIENCY: Chronic | ICD-10-CM

## 2019-02-15 DIAGNOSIS — E78.5 HYPERLIPIDEMIA, UNSPECIFIED HYPERLIPIDEMIA TYPE: Chronic | ICD-10-CM

## 2019-02-15 DIAGNOSIS — Z51.81 THERAPEUTIC DRUG MONITORING: ICD-10-CM

## 2019-02-19 LAB
25(OH)D3+25(OH)D2 SERPL-MCNC: 29.6 NG/ML (ref 30–100)
ALBUMIN SERPL-MCNC: 4.5 G/DL (ref 3.5–4.8)
ALBUMIN/GLOB SERPL: 2 {RATIO} (ref 1.2–2.2)
ALP SERPL-CCNC: 57 IU/L (ref 39–117)
ALT SERPL-CCNC: 22 IU/L (ref 0–44)
AST SERPL-CCNC: 22 IU/L (ref 0–40)
BILIRUB SERPL-MCNC: 0.9 MG/DL (ref 0–1.2)
BUN SERPL-MCNC: 22 MG/DL (ref 8–27)
BUN/CREAT SERPL: 19 (ref 10–24)
CALCIUM SERPL-MCNC: 9.4 MG/DL (ref 8.6–10.2)
CHLORIDE SERPL-SCNC: 104 MMOL/L (ref 96–106)
CHOLEST SERPL-MCNC: 110 MG/DL (ref 100–199)
CK SERPL-CCNC: 43 U/L (ref 24–204)
CO2 SERPL-SCNC: 26 MMOL/L (ref 20–29)
CREAT SERPL-MCNC: 1.17 MG/DL (ref 0.76–1.27)
ERYTHROCYTE [DISTWIDTH] IN BLOOD BY AUTOMATED COUNT: 14.4 % (ref 12.3–15.4)
GLOBULIN SER CALC-MCNC: 2.2 G/DL (ref 1.5–4.5)
GLUCOSE SERPL-MCNC: 98 MG/DL (ref 65–99)
HCT VFR BLD AUTO: 40 % (ref 37.5–51)
HDL SERPL-SCNC: 32.1 UMOL/L
HDLC SERPL-MCNC: 34 MG/DL
HGB BLD-MCNC: 13.5 G/DL (ref 13–17.7)
LDL SERPL QN: 19.7 NM
LDL SERPL-SCNC: 504 NMOL/L
LDL SMALL SERPL-SCNC: 396 NMOL/L
LDLC SERPL CALC-MCNC: 49 MG/DL (ref 0–99)
MCH RBC QN AUTO: 28.1 PG (ref 26.6–33)
MCHC RBC AUTO-ENTMCNC: 33.8 G/DL (ref 31.5–35.7)
MCV RBC AUTO: 83 FL (ref 79–97)
PLATELET # BLD AUTO: 114 X10E3/UL (ref 150–379)
POTASSIUM SERPL-SCNC: 4.5 MMOL/L (ref 3.5–5.2)
PROT SERPL-MCNC: 6.7 G/DL (ref 6–8.5)
RBC # BLD AUTO: 4.8 X10E6/UL (ref 4.14–5.8)
SODIUM SERPL-SCNC: 143 MMOL/L (ref 134–144)
T3FREE SERPL-MCNC: 3.2 PG/ML (ref 2–4.4)
T4 FREE SERPL-MCNC: 1.29 NG/DL (ref 0.82–1.77)
TRIGL SERPL-MCNC: 133 MG/DL (ref 0–149)
TSH SERPL DL<=0.005 MIU/L-ACNC: 3.28 UIU/ML (ref 0.45–4.5)
WBC # BLD AUTO: 2.9 X10E3/UL (ref 3.4–10.8)

## 2019-02-19 RX ORDER — METOPROLOL SUCCINATE 50 MG/1
TABLET, EXTENDED RELEASE ORAL
Qty: 90 TABLET | Refills: 1 | Status: SHIPPED | OUTPATIENT
Start: 2019-02-19 | End: 2019-08-10 | Stop reason: SDUPTHER

## 2019-02-25 ENCOUNTER — OFFICE VISIT (OUTPATIENT)
Dept: INTERNAL MEDICINE | Facility: CLINIC | Age: 79
End: 2019-02-25

## 2019-02-25 VITALS
BODY MASS INDEX: 26.66 KG/M2 | HEART RATE: 58 BPM | HEIGHT: 69 IN | WEIGHT: 180 LBS | DIASTOLIC BLOOD PRESSURE: 58 MMHG | OXYGEN SATURATION: 98 % | SYSTOLIC BLOOD PRESSURE: 130 MMHG

## 2019-02-25 DIAGNOSIS — E55.9 VITAMIN D DEFICIENCY: Chronic | ICD-10-CM

## 2019-02-25 DIAGNOSIS — Z98.61 HISTORY OF PTCA: Chronic | ICD-10-CM

## 2019-02-25 DIAGNOSIS — R31.29 MICROSCOPIC HEMATURIA: Chronic | ICD-10-CM

## 2019-02-25 DIAGNOSIS — I10 BENIGN ESSENTIAL HYPERTENSION: Chronic | ICD-10-CM

## 2019-02-25 DIAGNOSIS — I25.10 OCCLUSIVE CORONARY ARTERY DISEASE: Chronic | ICD-10-CM

## 2019-02-25 DIAGNOSIS — K21.9 GASTROESOPHAGEAL REFLUX DISEASE WITHOUT ESOPHAGITIS: Chronic | ICD-10-CM

## 2019-02-25 DIAGNOSIS — N40.1 BENIGN NON-NODULAR PROSTATIC HYPERPLASIA WITH LOWER URINARY TRACT SYMPTOMS: Chronic | ICD-10-CM

## 2019-02-25 DIAGNOSIS — D72.819 LEUKOPENIA, UNSPECIFIED TYPE: ICD-10-CM

## 2019-02-25 DIAGNOSIS — Z51.81 THERAPEUTIC DRUG MONITORING: ICD-10-CM

## 2019-02-25 DIAGNOSIS — I65.23 ATHEROSCLEROSIS OF BOTH CAROTID ARTERIES: Chronic | ICD-10-CM

## 2019-02-25 DIAGNOSIS — Z00.00 MEDICARE ANNUAL WELLNESS VISIT, SUBSEQUENT: Primary | ICD-10-CM

## 2019-02-25 DIAGNOSIS — I25.2 HISTORY OF MYOCARDIAL INFARCTION: Chronic | ICD-10-CM

## 2019-02-25 DIAGNOSIS — R97.20 ELEVATED PSA: Chronic | ICD-10-CM

## 2019-02-25 DIAGNOSIS — R16.1 SPLENOMEGALY: Chronic | ICD-10-CM

## 2019-02-25 DIAGNOSIS — D69.6 THROMBOCYTOPENIA (HCC): ICD-10-CM

## 2019-02-25 DIAGNOSIS — M65.331 TRIGGER FINGER, RIGHT MIDDLE FINGER: ICD-10-CM

## 2019-02-25 DIAGNOSIS — E78.5 HYPERLIPIDEMIA, UNSPECIFIED HYPERLIPIDEMIA TYPE: Chronic | ICD-10-CM

## 2019-02-25 PROBLEM — R05.3 PERSISTENT COUGH: Status: RESOLVED | Noted: 2019-01-03 | Resolved: 2019-02-25

## 2019-02-25 PROBLEM — J20.9 ACUTE BRONCHITIS WITH BRONCHOSPASM: Status: RESOLVED | Noted: 2018-12-12 | Resolved: 2019-02-25

## 2019-02-25 PROCEDURE — 99214 OFFICE O/P EST MOD 30 MIN: CPT | Performed by: INTERNAL MEDICINE

## 2019-02-25 PROCEDURE — 96160 PT-FOCUSED HLTH RISK ASSMT: CPT | Performed by: INTERNAL MEDICINE

## 2019-02-25 PROCEDURE — G0439 PPPS, SUBSEQ VISIT: HCPCS | Performed by: INTERNAL MEDICINE

## 2019-02-25 NOTE — PROGRESS NOTES
QUICK REFERENCE INFORMATION:  The ABCs of the Annual Wellness Visit    Subsequent Medicare Wellness Visit    HEALTH RISK ASSESSMENT     1940    Recent Hospitalizations:  No hospitalization(s) within the last year..        Current Medical Providers:  Patient Care Team:  Ramesh Blake MD as PCP - General (Internal Medicine)        Smoking Status:  Social History     Tobacco Use   Smoking Status Former Smoker   Smokeless Tobacco Never Used   Tobacco Comment    Stopped smoking in his early 20s.       Alcohol Consumption:  Social History     Substance and Sexual Activity   Alcohol Use Yes   • Alcohol/week: 0.6 oz   • Types: 1 Glasses of wine per week   • Drinks per session: 1 or 2   • Binge frequency: Never    Comment: Socially       Depression Screen:   PHQ-2/PHQ-9 Depression Screening 2/25/2019   Little interest or pleasure in doing things 0   Feeling down, depressed, or hopeless 0   Trouble falling or staying asleep, or sleeping too much 0   Feeling tired or having little energy 0   Poor appetite or overeating 0   Feeling bad about yourself - or that you are a failure or have let yourself or your family down 0   Trouble concentrating on things, such as reading the newspaper or watching television 0   Moving or speaking so slowly that other people could have noticed. Or the opposite - being so fidgety or restless that you have been moving around a lot more than usual 0   Thoughts that you would be better off dead, or of hurting yourself in some way 0   Total Score 0       Health Habits and Functional and Cognitive Screening:  Functional & Cognitive Status 2/25/2019   Do you have difficulty preparing food and eating? No   Do you have difficulty bathing yourself, getting dressed or grooming yourself? No   Do you have difficulty using the toilet? No   Do you have difficulty moving around from place to place? No   Do you have trouble with steps or getting out of a bed or a chair? No   In the past year have you  fallen or experienced a near fall? No   Current Diet Well Balanced Diet   Dental Exam Up to date   Eye Exam Up to date   Exercise (times per week) 0 times per week   Current Exercise Activities Include None   Do you need help using the phone?  No   Are you deaf or do you have serious difficulty hearing?  No   Do you need help with transportation? No   Do you need help shopping? No   Do you need help preparing meals?  No   Do you need help with housework?  No   Do you need help with laundry? No   Do you need help taking your medications? No   Do you need help managing money? No   Do you ever drive or ride in a car without wearing a seat belt? No   Have you felt unusual stress, anger or loneliness in the last month? No   Who do you live with? Spouse   If you need help, do you have trouble finding someone available to you? No   Have you been bothered in the last four weeks by sexual problems? No   Do you have difficulty concentrating, remembering or making decisions? No           Does the patient have evidence of cognitive impairment? No    Aspirin use counseling: Taking ASA appropriately as indicated      Recent Lab Results:  CMP:  Lab Results   Component Value Date    GLU 98 02/18/2019    BUN 22 02/18/2019    CREATININE 1.17 02/18/2019    EGFRIFNONA 59 (L) 02/18/2019    EGFRIFAFRI 69 02/18/2019    BCR 19 02/18/2019     02/18/2019    K 4.5 02/18/2019    CO2 26 02/18/2019    CALCIUM 9.4 02/18/2019    PROTENTOTREF 6.7 02/18/2019    ALBUMIN 4.5 02/18/2019    LABGLOBREF 2.2 02/18/2019    LABIL2 2.0 02/18/2019    BILITOT 0.9 02/18/2019    ALKPHOS 57 02/18/2019    AST 22 02/18/2019    ALT 22 02/18/2019     Lipid Panel:  Lab Results   Component Value Date    TRIG 133 02/18/2019    HDL 34 (L) 04/27/2015    VLDL 31 04/27/2015    LDLHDL 0.8 04/27/2015     HbA1c:       Visual Acuity:  No exam data present    Age-appropriate Screening Schedule:  Refer to the list below for future screening recommendations based on patient's  age, sex and/or medical conditions. Orders for these recommended tests are listed in the plan section. The patient has been provided with a written plan.    Health Maintenance   Topic Date Due   • LIPID PANEL  02/18/2020   • COLONOSCOPY  01/09/2022   • TDAP/TD VACCINES (2 - Td) 02/13/2027   • PNEUMOCOCCAL VACCINES (65+ LOW/MEDIUM RISK)  Completed   • INFLUENZA VACCINE  Discontinued   • ZOSTER VACCINE  Discontinued        Subjective   History of Present Illness    Sergey Mendoza is a 78 y.o. male who presents for an Subsequent Wellness Visit.    The following portions of the patient's history were reviewed and updated as appropriate: allergies, current medications, past family history, past medical history, past social history, past surgical history and problem list.    Outpatient Medications Prior to Visit   Medication Sig Dispense Refill   • aspirin 81 MG tablet Take 81 mg by mouth Daily.     • Coenzyme Q10 (COQ10) 400 MG capsule Take 1 capsule by mouth daily. Take with a meal.     • ezetimibe (ZETIA) 10 MG tablet 1 by mouth daily for cholesterol 90 tablet 3   • metoprolol succinate XL (TOPROL-XL) 50 MG 24 hr tablet TAKE 1 TABLET EVERY DAY 90 tablet 1   • simvastatin (ZOCOR) 40 MG tablet TAKE 1 TABLET BY MOUTH EVERY NIGHT. 90 tablet 1   • triamcinolone (KENALOG) 0.1 % cream APPLY TO AFFECTED AREA TWICE A DAY AS NEEDED  0     No facility-administered medications prior to visit.        Patient Active Problem List   Diagnosis   • Benign essential hypertension   • Benign prostatic hypertrophy, 07/07/2008--negative biopsy.   • Carotid artery plaque, 09/10/2018--mild bilateral. 08/11/2016--mild bilateral carotid artery plaque.   • Occlusive coronary artery disease, 10/08/2010--anterior MI.  EF 40%.  PTCA with drug-eluting stent proximal LAD.  10% LM.  100% diagonal 1.  30% diffuse circumflex.  30% diffuse RCA.   • Diverticulosis of colon   • Gastroesophageal reflux disease without esophagitis   • Generalized  osteoarthritis of multiple sites   • Hyperlipidemia   • History of myocardial infarction, 10/08/2010--patient presented with an anterior myocardial infarction.   • Primary osteoarthritis of right knee   • History of PTCA, 10/08/2010--anterior MI.  EF 40%.  PTCA with drug-eluting stent proximal LAD.  10% LM.  100% diagonal 1.  30% diffuse circumflex.  30% diffuse RCA.  2011--EF 64%   • Vitamin D deficiency   • Elevated PSA   • Internal hemorrhoids   • Therapeutic drug monitoring   • Splenomegaly   • Multiple actinic keratoses   • Asymptomatic cholelithiasis   • Microscopic hematuria       Advance Care Planning:  has an advance directive - a copy has been provided and is in file    Identification of Risk Factors:  Risk factors include: weight  and cardiovascular risk.    Review of Systems   Musculoskeletal: Positive for arthralgias.   All other systems reviewed and are negative.      Compared to one year ago, the patient feels his physical health is the same.  Compared to one year ago, the patient feels his mental health is the same.    Objective       Physical Exam    General: Alert and oriented x 3.  No acute distress.  Normal affect.  HEENT: Pupils equal, round, reactive to light; extraocular movements intact; sclerae nonicteric; pharynx, ear canals and TMs normal.  Neck: Without JVD, thyromegaly, bruit, or adenopathy.  Lungs: Clear to auscultation in all fields.  Heart: Regular rate and rhythm without murmur, rub, gallop, or click.  Abdomen: Soft, nontender, without hepatosplenomegaly or hernia.  Bowel sounds normal.  : Deferred.  Rectal: Deferred.  Extremities: Without clubbing, cyanosis, edema, or pulse deficit.  Neurologic: Intact without focal deficit.  Normal station and gait observed during ingress and egress from the examination room.  Skin: Without significant lesion.  Musculoskeletal: Unremarkable.    Vitals:    02/25/19 0706   BP: 130/58   BP Location: Right arm   Pulse: 58   SpO2: 98%   Weight: 81.6  "kg (180 lb)   Height: 175.3 cm (69.02\")       Patient's Body mass index is 26.57 kg/m². BMI is above normal parameters. Recommendations include: exercise counseling, nutrition counseling and referral to primary care.      Assessment/Plan   Patient Self-Management and Personalized Health Advice  The patient has been provided with information about: diet, exercise, weight management, prevention of cardiac or vascular disease and fall prevention and preventive services including:   · Counseling for cardiovascular disease risk reduction, Diabetes screening, see lab orders, Exercise counseling provided, Glaucoma screening recommended, Nutrition counseling provided, Prostate cancer screening discussed, Zostavax vaccine (Herpes Zoster).    Visit Diagnoses:    ICD-10-CM ICD-9-CM   1. Medicare annual wellness visit, subsequent Z00.00 V70.0   2. Hyperlipidemia, unspecified hyperlipidemia type E78.5 272.4   3. Occlusive coronary artery disease, 10/08/2010--anterior MI.  EF 40%.  PTCA with drug-eluting stent proximal LAD.  10% LM.  100% diagonal 1.  30% diffuse circumflex.  30% diffuse RCA. I25.10 414.00   4. History of PTCA, 10/08/2010--anterior MI.  EF 40%.  PTCA with drug-eluting stent proximal LAD.  10% LM.  100% diagonal 1.  30% diffuse circumflex.  30% diffuse RCA.  2011--EF 64% Z98.61 V45.82   5. History of myocardial infarction, 10/08/2010--patient presented with an anterior myocardial infarction. I25.2 412   6. Benign essential hypertension I10 401.1   7. Carotid artery plaque, 08/11/2016--mild bilateral carotid artery plaque. I65.23 433.10     433.30   8. Benign prostatic hypertrophy, 07/07/2008--negative biopsy. N40.1 600.91   9. Gastroesophageal reflux disease without esophagitis K21.9 530.81   10. Vitamin D deficiency E55.9 268.9   11. Elevated PSA R97.20 790.93   12. Splenomegaly R16.1 789.2   13. Microscopic hematuria R31.29 599.72   14. Therapeutic drug monitoring Z51.81 V58.83   15. Thrombocytopenia (CMS/[facility]) " D69.6 287.5   16. Leukopenia, unspecified type D72.819 288.50       Orders Placed This Encounter   Procedures   • CK     Standing Status:   Future     Standing Expiration Date:   2/25/2021   • Comprehensive Metabolic Panel     Standing Status:   Future     Standing Expiration Date:   2/25/2021   • NMR LipoProfile     Standing Status:   Future     Standing Expiration Date:   2/25/2021   • Vitamin D 25 Hydroxy     Standing Status:   Future     Standing Expiration Date:   2/25/2021   • Urinalysis With Microscopic If Indicated (No Culture) - Urine, Clean Catch     Standing Status:   Future     Standing Expiration Date:   2/25/2020   • TSH     Standing Status:   Future     Standing Expiration Date:   2/25/2021   • T4, Free     Standing Status:   Future     Standing Expiration Date:   2/25/2021   • T3, Free     Standing Status:   Future     Standing Expiration Date:   2/25/2021   • CBC & Differential     Standing Status:   Future     Standing Expiration Date:   2/25/2021     Order Specific Question:   Manual Differential     Answer:   No       Outpatient Encounter Medications as of 2/25/2019   Medication Sig Dispense Refill   • aspirin 81 MG tablet Take 81 mg by mouth Daily.     • Coenzyme Q10 (COQ10) 400 MG capsule Take 1 capsule by mouth daily. Take with a meal.     • ezetimibe (ZETIA) 10 MG tablet 1 by mouth daily for cholesterol 90 tablet 3   • metoprolol succinate XL (TOPROL-XL) 50 MG 24 hr tablet TAKE 1 TABLET EVERY DAY 90 tablet 1   • simvastatin (ZOCOR) 40 MG tablet TAKE 1 TABLET BY MOUTH EVERY NIGHT. 90 tablet 1   • triamcinolone (KENALOG) 0.1 % cream APPLY TO AFFECTED AREA TWICE A DAY AS NEEDED  0   • Cholecalciferol (VITAMIN D3) 5000 units capsule capsule 1 by mouth daily as directed 30 capsule      No facility-administered encounter medications on file as of 2/25/2019.        Reviewed use of high risk medication in the elderly: yes  Reviewed for potential of harmful drug interactions in the elderly:  yes    Follow Up:  No Follow-up on file.     An After Visit Summary and PPPS with all of these plans were given to the patient.

## 2019-02-25 NOTE — PROGRESS NOTES
02/25/2019    Patient Information  Sergey Mendoza                                                                                          [address]  [facility] [address]      1940  [unfilled]  There is no work phone number on file.    Chief Complaint:     Subsequent Medicare wellness visit.  Follow-up hyperlipidemia, occlusive coronary artery disease with history of PTCA and history of myocardial infarction back in 2010, hypertension, carotid artery plaque and carotid Doppler study, BPH with elevated PSA, esophageal reflux, microscopic hematuria, vitamin D deficiency.  No new acute complaints    History of Present Illness:    Patient with a history of medical problems as outlined in the chief complaint presents today for follow-up with lab prior and also subsequent Medicare wellness visit.  Patient currently feels well and has no new acute complaints although he is having some mild problems with memory that he does not feel we need to address at this time.  Past medical history reviewed and updated were necessary including health maintenance parameters.  This reveals he will be up-to-date or else accounted for after today's visit.    Review of Systems   Constitution: Negative.   HENT: Negative.    Eyes: Negative.    Cardiovascular: Negative.    Respiratory: Negative.    Endocrine: Negative.    Hematologic/Lymphatic: Negative.    Skin: Negative.    Musculoskeletal: Negative.    Gastrointestinal: Negative.    Genitourinary: Negative.    Neurological: Negative.    Psychiatric/Behavioral: Negative.    Allergic/Immunologic: Negative.        Active Problems:    Patient Active Problem List   Diagnosis   • Benign essential hypertension   • Benign prostatic hypertrophy, 07/07/2008--negative biopsy.   • Carotid artery plaque, 09/10/2018--mild bilateral. 08/11/2016--mild bilateral carotid artery plaque.   • Occlusive coronary artery disease, 10/08/2010--anterior MI.  EF 40%.  PTCA with drug-eluting  stent proximal LAD.  10% LM.  100% diagonal 1.  30% diffuse circumflex.  30% diffuse RCA.   • Diverticulosis of colon   • Gastroesophageal reflux disease without esophagitis   • Generalized osteoarthritis of multiple sites   • Hyperlipidemia   • History of myocardial infarction, 10/08/2010--patient presented with an anterior myocardial infarction.   • Primary osteoarthritis of right knee   • History of PTCA, 10/08/2010--anterior MI.  EF 40%.  PTCA with drug-eluting stent proximal LAD.  10% LM.  100% diagonal 1.  30% diffuse circumflex.  30% diffuse RCA.  2011--EF 64%   • Vitamin D deficiency   • Elevated PSA   • Internal hemorrhoids   • Therapeutic drug monitoring   • Splenomegaly   • Multiple actinic keratoses   • Asymptomatic cholelithiasis   • Microscopic hematuria         Past Medical History:   Diagnosis Date   • Asymptomatic cholelithiasis 2/19/2018   • Benign essential hypertension 3/24/2006    Diagnosed approximately March 2006.   • Benign prostatic hypertrophy, 07/07/2008--negative biopsy. 7/7/2008    Patient had a history of elevated PSA and found to have benign prostatic hypertrophy.   07/07/2008--negative prostate biopsy.   • Carotid artery plaque, 09/10/2018--mild bilateral. 08/11/2016--mild bilateral carotid artery plaque. 4/21/2009    September 10, 2018--carotid Doppler study reveals mild bilateral carotid artery plaque.  08/11/2016--carotid Doppler study reveals mild bilateral carotid plaque.  05/28/2014--vascular screen reveals mild bilateral carotid plaque, negative for AAA, negative for PAD.   03/30/2011--vascular screen revealed mild bilateral carotid plaque, negative for abdominal aneurysm, negative for PAD.   04/21/2009-   • Diverticulosis of colon 1/9/2012 01/09/2012--normal colonoscopy except for diffuse diverticulosis.   2004--colonoscopy normal.   • Elevated PSA 11/6/2013 02/13/2017--patient seen in follow-up and reports he is followed on a yearly basis by the urologist.  Last  follow-up was in December 2016 and his PSA was less than 5.  07/28/2016--PSA elevated at 4.88.  03/19/2014--he was seen in followup and his PSA was back down to 3.9. Patient was not having any urinary symptoms.  12/11/2013--patient was evaluated by the urologist and he elected to just observe the PSA.   11/06/2013--patient seen in followup in his PSA returned even higher at 5.7. He was referred back to urology.   11/06/2013--PSA returned elevated at 4.3. Patient did have some urinary symptoms consisting of periods of weak stream and dribbling. He had never been treated for chronic prostatitis. I treated him with ciprofloxacin 500 mg for 30 days.       • Gastroesophageal reflux disease without esophagitis 3/24/2016   • Generalized osteoarthritis of multiple sites 3/24/2016   • History of cardiac catheterization 10/8/2010    10/08/2010--patient presented with an anterior myocardial infarction. Heart catheterization revealed anterior/apical hypokinesis with 40% ejection fraction. Severe 80% ostial LAD stenosis. Mild 10% left main. Mild diffuse LAD. Totally occluded diagonal one. 30% diffuse circumflex. Mild 30% percent diffuse RCA. Angioplasty with drug eluding stent performed to the proximal LAD.    • History of Chronic Duodenitis 1/9/2012 01/09/2012--EGD revealed mild distal erythema of the stomach and proximal duodenum.   • History of closed Colles' fracture of left radius 05/05/1953    13 years of age.   • History of iron deficiency anemia 6/23/2012 02/07/2017--hemoglobin normal at 14.2, hematocrit normal at 45.1.  Serum iron normal at 105.  Iron saturation normal at 30%.  Resolve this issue.  08/02/2016--patient seen in follow-up.  White count is low at 3.66.  Hemoglobin low at 13.3.  Hematocrit normal at 41.8.  Platelets low at 109.  Homocystine and methylmalonic acid are normal.  Serum iron normal at 98.  Iron saturation normal at 28.  Patient is taking iron sulfate 325 mg daily.  10/13/2013--CBC was  entirely normal, anemia resolved we'll continue to monitor.  06/23/2012--patient treated for iron deficiency anemia caused by duodenitis which was revealed per EGD 01/09/2012.    • History of myocardial infarction, 10/08/2010--patient presented with an anterior myocardial infarction. 10/8/2010    Stress test 02/23/2011--mild anterior wall ischemia, ejection fraction improved to 64%.  10/08/2010--patient presented with an anterior myocardial infarction. Heart catheterization revealed anterior/apical hypokinesis with 40% ejection fraction. Severe 80% ostial LAD stenosis. Mild 10% left main. Mild diffuse LAD. Totally occluded diagonal one. 30% diffuse circumflex. Mild 30% percent diffuse RCA. Angioplasty with drug eluding stent performed to the proximal LAD.    • History of PTCA, 10/08/2010--anterior MI.  EF 40%.  PTCA with drug-eluting stent proximal LAD.  10% LM.  100% diagonal 1.  30% diffuse circumflex.  30% diffuse RCA.  2011--EF 64% 10/8/2010    Stress test 02/23/2011--mild anterior wall ischemia, ejection fraction improved to 64%.  10/08/2010--patient presented with an anterior myocardial infarction. Heart catheterization revealed anterior/apical hypokinesis with 40% ejection fraction. Severe 80% ostial LAD stenosis. Mild 10% left main. Mild diffuse LAD. Totally occluded diagonal one. 30% diffuse circumflex. Mild 30% percent diffuse RCA. Angioplasty with drug eluding stent performed to the proximal LAD.    • History of Tear of medial meniscus of right knee 10/17/2014    12/09/2014--patient was evaluated by the orthopedist. He felt that patient also had end stage osteoarthritis the right knee and will eventually need a total knee replacement. In the meantime he gave a cortisone injection in the knee and patient reports this did help. Patient also went to physical therapy which seemed to help somewhat also.   10/29/2014--MRI of the right knee reveals tricompartmental osteoarthritis that is severe in the medial  compartment. There is a degenerative tear of the medial meniscus. There is a joint effusion with Baker's cyst. Intramuscular extension of the Baker's cyst into the medial head of the gastrocnemius muscle. Patient referred to orthopedics.   10/17/2014--x-ray of the right knee reveals a small suprapatellar effusion. Narrowing of the medial compartment. There is patellar spur formation. No fracture, dislocation, bone lesion is demonstrated.   10/17/2014--patient was on tour bus in [place] this past October 2013. He was trying to sit down and was in an awkward position.    • Hyperlipidemia 3/6/2011    03/16/2011--treatment for hyperlipidemia and initiated.   • Internal hemorrhoids 3/24/2016   • Microscopic hematuria 2/19/2018 12/20/2017--patient was evaluated by the urologist for a routine follow-up elevated PSA.  He was noted to have microscopic hematuria with 3-5 RBCs.  Cystoscopy was reportedly negative.  Patient also had a CT scan of the abdomen and pelvis which revealed asymptomatic cholelithiasis, diffuse atherosclerosis, and enlarged prostate.   • Multiple actinic keratoses 8/2/2016 08/02/2016--patient presents with multiple actinic keratoses as well as seborrheic keratoses.  Dermatology referral given.   • Occlusive coronary artery disease, 10/08/2010--anterior MI.  EF 40%.  PTCA with drug-eluting stent proximal LAD.  10% LM.  100% diagonal 1.  30% diffuse circumflex.  30% diffuse RCA. 10/8/2010    02/23/2011--stress Cardiolite reveals mild anterior wall ischemia, ejection fraction improved to 64%.  10/08/2010--patient presented with an anterior myocardial infarction. Heart catheterization revealed anterior/apical hypokinesis with 40% ejection fraction. Severe 80% ostial LAD stenosis. Mild 10% left main. Mild diffuse LAD. Totally occluded diagonal one. 30% diffuse circumflex. Mild 30% percent diffuse RCA. Angioplasty with drug eluding stent performed to the proximal LAD.   10/08/2010--anterior MI.  EF 40%.   PTCA with drug-eluting stent proximal LAD.  10% LM.  100% diagonal 1.  30% diffuse circumflex.  30% diffuse RCA.   • Primary osteoarthritis of right knee 10/17/2014    12/09/2014--patient was evaluated by the orthopedist. He felt that patient also had end stage osteoarthritis the right knee and will eventually need a total knee replacement. In the meantime he gave a cortisone injection in the knee and patient reports this did help. Patient also went to physical therapy which seemed to help somewhat also.  10/29/2014--MRI of the right knee reveals tricompartmental osteoarthritis that is severe in the medial compartment. There is a degenerative tear of the medial meniscus. There is a joint effusion with Baker's cyst. Intramuscular extension of the Baker's cyst into the medial head of the gastrocnemius muscle. Patient referred to orthopedics.   10/17/2014--x-ray of the right knee reveals a small suprapatellar effusion. Narrowing of the medial compartment. There is patellar spur formation. No fracture, dislocation, bone lesion is demonstrated.   10/17/2014--patient was on tour bus in [place] this past October 2013. He was trying to sit down and was in an awkward position. T   • Splenomegaly 8/2/2016 09/06/2013--CT scan reveals mild splenomegaly.  Although not in the report, the radiologist did compare the CT scan from 09/06/2013 to the one from 2010.  The spleen actually looks smaller in size, 13.5 cm compared to 15 cm previously.  In another direction, spleen is 20 cm as compared to 24 cm.  The last dimension was the maximum length.  08/04/2010--initial evaluation by the hematologist. WBCs 3.5. Differential normal. Absolute neutrophil count 2500. Hemoglobin normal at 14.2. MCV low at 83. Platelet count low at 125. IPF normal at 4.6%. Review of old labs through 1997 revealed a white blood cell count has fluctuated between 2.8 and 3.9. There is no trend downward but it is fluctuating. Platelet count has ranged between  148 and 198. MCV has been low. Liver spleen scan revealed mild splenomegaly. B12, folic acid, iron studies, rheumatoid factor, SPEP have all been normal. Felt to be mild pancytopenia secondary to splenomegaly. Patient is followed on a regular basis by the hematologist.   • Vitamin D deficiency 3/24/2016         Past Surgical History:   Procedure Laterality Date   • CARDIAC CATHETERIZATION  10/08/2010    See past medical history   • COLONOSCOPY  01/09/2012 01/09/2012--normal colonoscopy except for diffuse diverticulosis.    • COLONOSCOPY  2004 2004--colonoscopy normal.   • CORONARY ANGIOPLASTY WITH STENT PLACEMENT  10/10/2010    10/10/2010--critical 80% proximal LAD stenosis treated using a drug-coated stent. Unable to cross diagonal one.   • ESOPHAGOSCOPY / EGD  01/09/2012 01/09/2012--EGD performed for iron deficiency anemia revealed mild erythema of the distal stomach and proximal duodenum consistent with duodenitis.   • PROSTATE BIOPSY  07/07/2008 07/07/2008--negative prostate biopsy.  Patient had a history of elevated PSA and found to have benign prostatic hypertrophy.          Allergies   Allergen Reactions   • Penicillins    • Sulfa Antibiotics            Current Outpatient Medications:   •  aspirin 81 MG tablet, Take 81 mg by mouth Daily., Disp: , Rfl:   •  Coenzyme Q10 (COQ10) 400 MG capsule, Take 1 capsule by mouth daily. Take with a meal., Disp: , Rfl:   •  ezetimibe (ZETIA) 10 MG tablet, 1 by mouth daily for cholesterol, Disp: 90 tablet, Rfl: 3  •  metoprolol succinate XL (TOPROL-XL) 50 MG 24 hr tablet, TAKE 1 TABLET EVERY DAY, Disp: 90 tablet, Rfl: 1  •  simvastatin (ZOCOR) 40 MG tablet, TAKE 1 TABLET BY MOUTH EVERY NIGHT., Disp: 90 tablet, Rfl: 1  •  triamcinolone (KENALOG) 0.1 % cream, APPLY TO AFFECTED AREA TWICE A DAY AS NEEDED, Disp: , Rfl: 0      Family History   Problem Relation Age of Onset   • Coronary artery disease Mother    • Stroke Mother    • No Known Problems Father    • Cancer  "Maternal Grandmother    • Heart attack Maternal Grandfather    • Heart disease Maternal Grandfather    • No Known Problems Paternal Grandmother    • No Known Problems Paternal Grandfather          Social History     Socioeconomic History   • Marital status:      Spouse name: Not on file   • Number of children: Not on file   • Years of education: Not on file   • Highest education level: Not on file   Social Needs   • Financial resource strain: Not hard at all   • Food insecurity - worry: Patient refused   • Food insecurity - inability: Patient refused   • Transportation needs - medical: Patient refused   • Transportation needs - non-medical: Patient refused   Occupational History   • Occupation: Retired - Sales and [employer]   Tobacco Use   • Smoking status: Former Smoker   • Smokeless tobacco: Never Used   • Tobacco comment: Stopped smoking in his early 20s.   Substance and Sexual Activity   • Alcohol use: Yes     Alcohol/week: 0.6 oz     Types: 1 Glasses of wine per week     Drinks per session: 1 or 2     Binge frequency: Never     Comment: Socially   • Drug use: No   • Sexual activity: Yes     Partners: Female   Other Topics Concern   • Not on file   Social History Narrative   • Not on file         Vitals:    02/25/19 0706   BP: 130/58   BP Location: Right arm   Pulse: 58   SpO2: 98%   Weight: 81.6 kg (180 lb)   Height: 175.3 cm (69.02\")          Physical Exam:    General: Alert and oriented x 3.  No acute distress.  Normal affect.  HEENT: Pupils equal, round, reactive to light; extraocular movements intact; sclerae nonicteric; pharynx, ear canals and TMs normal.  Neck: Without JVD, thyromegaly, bruit, or adenopathy.  Lungs: Clear to auscultation in all fields.  Heart: Regular rate and rhythm without murmur, rub, gallop, or click.  Abdomen: Soft, nontender, without hepatosplenomegaly or hernia.  Bowel sounds normal.  : Deferred.  Rectal: Deferred.  Extremities: Without clubbing, cyanosis, edema, or pulse " deficit.  Neurologic: Intact without focal deficit.  Normal station and gait observed during ingress and egress from the examination room.  Skin: Without significant lesion.  Musculoskeletal: Unremarkable.    Lab/other results:    I reviewed the carotid Doppler study which revealed mild carotid artery plaque bilaterally.    NMR is absolutely perfect.  CMP normal.  CBC normal except white count low at 2.9 and platelets low at 114.  Thyroid function tests were normal.  Vitamin D is a little low at 29.6.  CPK normal.    Assessment/Plan:     Diagnosis Plan   1. Medicare annual wellness visit, subsequent     2. Hyperlipidemia, unspecified hyperlipidemia type     3. Occlusive coronary artery disease, 10/08/2010--anterior MI.  EF 40%.  PTCA with drug-eluting stent proximal LAD.  10% LM.  100% diagonal 1.  30% diffuse circumflex.  30% diffuse RCA.     4. History of PTCA, 10/08/2010--anterior MI.  EF 40%.  PTCA with drug-eluting stent proximal LAD.  10% LM.  100% diagonal 1.  30% diffuse circumflex.  30% diffuse RCA.  2011--EF 64%     5. History of myocardial infarction, 10/08/2010--patient presented with an anterior myocardial infarction.     6. Benign essential hypertension     7. Carotid artery plaque, 08/11/2016--mild bilateral carotid artery plaque.     8. Benign prostatic hypertrophy, 07/07/2008--negative biopsy.     9. Gastroesophageal reflux disease without esophagitis     10. Vitamin D deficiency     11. Elevated PSA     12. Splenomegaly     13. Microscopic hematuria     14. Therapeutic drug monitoring       The subsequent Medicare wellness visit is documented on a separate note.    Patient has hyperlipidemia which is under excellent control which is important given his coronary artery disease as well as carotid artery plaque.  Both of these seem to be stable.  Blood pressure seems well controlled on metoprolol.  Patient does have BPH and an elevated PSA and he is followed closely by the urologist.  Esophageal  reflux is no longer an issue.  Vitamin D is slightly low and I encourage patient to take vitamin D supplementation 5000 units/day.  Patient does have mild splenomegaly and has leukopenia and thrombocytopenia and this has been evaluated by the hematologist.    Plan is as follows: No change in current medical regimen.  Patient will follow-up in 1 year with lab prior and this will also be subsequent Medicare wellness visit.  Otherwise, patient can follow-up as needed.    Addendum: February 25, 2019--at the end of the follow-up visit patient reports nearly 1 year history of triggering/locking of his right middle finger.  Examination confirms trigger finger.  Patient referred to the hand surgeon.    Procedures

## 2019-03-14 RX ORDER — SIMVASTATIN 40 MG
TABLET ORAL
Qty: 90 TABLET | Refills: 3 | Status: SHIPPED | OUTPATIENT
Start: 2019-03-14 | End: 2020-02-19

## 2019-03-19 DIAGNOSIS — E78.5 HYPERLIPIDEMIA, UNSPECIFIED HYPERLIPIDEMIA TYPE: Chronic | ICD-10-CM

## 2019-03-19 RX ORDER — EZETIMIBE 10 MG/1
TABLET ORAL
Qty: 90 TABLET | Refills: 3 | Status: SHIPPED | OUTPATIENT
Start: 2019-03-19 | End: 2020-04-02

## 2019-08-12 RX ORDER — METOPROLOL SUCCINATE 50 MG/1
TABLET, EXTENDED RELEASE ORAL
Qty: 90 TABLET | Refills: 0 | Status: SHIPPED | OUTPATIENT
Start: 2019-08-12 | End: 2019-11-13 | Stop reason: SDUPTHER

## 2019-11-13 RX ORDER — METOPROLOL SUCCINATE 50 MG/1
TABLET, EXTENDED RELEASE ORAL
Qty: 90 TABLET | Refills: 0 | Status: SHIPPED | OUTPATIENT
Start: 2019-11-13 | End: 2020-02-10

## 2020-02-10 RX ORDER — METOPROLOL SUCCINATE 50 MG/1
TABLET, EXTENDED RELEASE ORAL
Qty: 90 TABLET | Refills: 0 | Status: SHIPPED | OUTPATIENT
Start: 2020-02-10 | End: 2020-04-20

## 2020-02-19 ENCOUNTER — OFFICE VISIT (OUTPATIENT)
Dept: CARDIOLOGY | Facility: CLINIC | Age: 80
End: 2020-02-19

## 2020-02-19 VITALS
BODY MASS INDEX: 26.51 KG/M2 | SYSTOLIC BLOOD PRESSURE: 122 MMHG | HEART RATE: 58 BPM | DIASTOLIC BLOOD PRESSURE: 70 MMHG | OXYGEN SATURATION: 99 % | WEIGHT: 179 LBS | HEIGHT: 69 IN | RESPIRATION RATE: 16 BRPM

## 2020-02-19 DIAGNOSIS — I10 BENIGN ESSENTIAL HYPERTENSION: Primary | Chronic | ICD-10-CM

## 2020-02-19 DIAGNOSIS — E78.00 PURE HYPERCHOLESTEROLEMIA: Chronic | ICD-10-CM

## 2020-02-19 DIAGNOSIS — I25.10 CORONARY ARTERY DISEASE INVOLVING NATIVE CORONARY ARTERY OF NATIVE HEART WITHOUT ANGINA PECTORIS: Chronic | ICD-10-CM

## 2020-02-19 DIAGNOSIS — I65.23 ATHEROSCLEROSIS OF BOTH CAROTID ARTERIES: Chronic | ICD-10-CM

## 2020-02-19 PROCEDURE — 99214 OFFICE O/P EST MOD 30 MIN: CPT | Performed by: INTERNAL MEDICINE

## 2020-02-19 PROCEDURE — 93000 ELECTROCARDIOGRAM COMPLETE: CPT | Performed by: INTERNAL MEDICINE

## 2020-02-19 RX ORDER — ROSUVASTATIN CALCIUM 20 MG/1
20 TABLET, COATED ORAL DAILY
Qty: 90 TABLET | Refills: 3 | Status: SHIPPED | OUTPATIENT
Start: 2020-02-19 | End: 2020-07-09 | Stop reason: SDUPTHER

## 2020-02-19 NOTE — PROGRESS NOTES
PATIENTINFORMATION    Date of Office Visit: 2020  Encounter Provider: Natalio Morales MD  Place of Service: [facility] CARDIOLOGY  Patient Name: Sergey Mendoza  : 1940    Subjective:     Encounter Date:2020      Patient ID: Sergey Mendoza is a 79 y.o. male.    Chief Complaint   Patient presents with   • Follow-up     HPI  Mr. Mendoza is a 79 years old man with past medical history of coronary artery disease status post PCI of proximal LAD, hyperlipidemia came to cardiology clinic for regular follow-up visit.  He denied any significant or acute events since his last visit last year.  Particularly denied any rest or exertional chest pain, palpitations, orthopnea or PND.  No leg pain on exertion and he denied any symptoms of TIA or stroke.  He is compliant with his treatment regimen.  Denied any significant side effects from his medications.  He admits not exercising regularly because of severe right knee pain from osteoarthritis.  He was advised to get surgery years ago that he deferred.  He is planning to go back to his orthopedic surgeon soon.    ROS   All systems reviewed and negative except as noted in HPI    Past Medical History:   Diagnosis Date   • Asymptomatic cholelithiasis 2018   • Benign essential hypertension 3/24/2006    Diagnosed approximately 2006.   • Benign prostatic hypertrophy, 2008--negative biopsy. 2008    Patient had a history of elevated PSA and found to have benign prostatic hypertrophy.   2008--negative prostate biopsy.   • Carotid artery plaque, 09/10/2018--mild bilateral. 2016--mild bilateral carotid artery plaque. 2009    September 10, 2018--carotid Doppler study reveals mild bilateral carotid artery plaque.  2016--carotid Doppler study reveals mild bilateral carotid plaque.  2014--vascular screen reveals mild bilateral carotid plaque, negative for AAA, negative for PAD.   2011--vascular  screen revealed mild bilateral carotid plaque, negative for abdominal aneurysm, negative for PAD.   04/21/2009-   • Diverticulosis of colon 1/9/2012 01/09/2012--normal colonoscopy except for diffuse diverticulosis.   2004--colonoscopy normal.   • Elevated PSA 11/6/2013 02/13/2017--patient seen in follow-up and reports he is followed on a yearly basis by the urologist.  Last follow-up was in December 2016 and his PSA was less than 5.  07/28/2016--PSA elevated at 4.88.  03/19/2014--he was seen in followup and his PSA was back down to 3.9. Patient was not having any urinary symptoms.  12/11/2013--patient was evaluated by the urologist and he elected to just observe the PSA.   11/06/2013--patient seen in followup in his PSA returned even higher at 5.7. He was referred back to urology.   11/06/2013--PSA returned elevated at 4.3. Patient did have some urinary symptoms consisting of periods of weak stream and dribbling. He had never been treated for chronic prostatitis. I treated him with ciprofloxacin 500 mg for 30 days.       • Gastroesophageal reflux disease without esophagitis 3/24/2016   • Generalized osteoarthritis of multiple sites 3/24/2016   • History of cardiac catheterization 10/8/2010    10/08/2010--patient presented with an anterior myocardial infarction. Heart catheterization revealed anterior/apical hypokinesis with 40% ejection fraction. Severe 80% ostial LAD stenosis. Mild 10% left main. Mild diffuse LAD. Totally occluded diagonal one. 30% diffuse circumflex. Mild 30% percent diffuse RCA. Angioplasty with drug eluding stent performed to the proximal LAD.    • History of Chronic Duodenitis 1/9/2012 01/09/2012--EGD revealed mild distal erythema of the stomach and proximal duodenum.   • History of closed Colles' fracture of left radius 05/05/1953    13 years of age.   • History of iron deficiency anemia 6/23/2012 02/07/2017--hemoglobin normal at 14.2, hematocrit normal at 45.1.  Serum iron normal  at 105.  Iron saturation normal at 30%.  Resolve this issue.  08/02/2016--patient seen in follow-up.  White count is low at 3.66.  Hemoglobin low at 13.3.  Hematocrit normal at 41.8.  Platelets low at 109.  Homocystine and methylmalonic acid are normal.  Serum iron normal at 98.  Iron saturation normal at 28.  Patient is taking iron sulfate 325 mg daily.  10/13/2013--CBC was entirely normal, anemia resolved we'll continue to monitor.  06/23/2012--patient treated for iron deficiency anemia caused by duodenitis which was revealed per EGD 01/09/2012.    • History of myocardial infarction, 10/08/2010--patient presented with an anterior myocardial infarction. 10/8/2010    Stress test 02/23/2011--mild anterior wall ischemia, ejection fraction improved to 64%.  10/08/2010--patient presented with an anterior myocardial infarction. Heart catheterization revealed anterior/apical hypokinesis with 40% ejection fraction. Severe 80% ostial LAD stenosis. Mild 10% left main. Mild diffuse LAD. Totally occluded diagonal one. 30% diffuse circumflex. Mild 30% percent diffuse RCA. Angioplasty with drug eluding stent performed to the proximal LAD.    • History of PTCA, 10/08/2010--anterior MI.  EF 40%.  PTCA with drug-eluting stent proximal LAD.  10% LM.  100% diagonal 1.  30% diffuse circumflex.  30% diffuse RCA.  2011--EF 64% 10/8/2010    Stress test 02/23/2011--mild anterior wall ischemia, ejection fraction improved to 64%.  10/08/2010--patient presented with an anterior myocardial infarction. Heart catheterization revealed anterior/apical hypokinesis with 40% ejection fraction. Severe 80% ostial LAD stenosis. Mild 10% left main. Mild diffuse LAD. Totally occluded diagonal one. 30% diffuse circumflex. Mild 30% percent diffuse RCA. Angioplasty with drug eluding stent performed to the proximal LAD.    • History of Tear of medial meniscus of right knee 10/17/2014    12/09/2014--patient was evaluated by the orthopedist. He felt that  patient also had end stage osteoarthritis the right knee and will eventually need a total knee replacement. In the meantime he gave a cortisone injection in the knee and patient reports this did help. Patient also went to physical therapy which seemed to help somewhat also.   10/29/2014--MRI of the right knee reveals tricompartmental osteoarthritis that is severe in the medial compartment. There is a degenerative tear of the medial meniscus. There is a joint effusion with Baker's cyst. Intramuscular extension of the Baker's cyst into the medial head of the gastrocnemius muscle. Patient referred to orthopedics.   10/17/2014--x-ray of the right knee reveals a small suprapatellar effusion. Narrowing of the medial compartment. There is patellar spur formation. No fracture, dislocation, bone lesion is demonstrated.   10/17/2014--patient was on tour bus in [place] this past October 2013. He was trying to sit down and was in an awkward position.    • Hyperlipidemia 3/6/2011    03/16/2011--treatment for hyperlipidemia and initiated.   • Internal hemorrhoids 3/24/2016   • Microscopic hematuria 2/19/2018 12/20/2017--patient was evaluated by the urologist for a routine follow-up elevated PSA.  He was noted to have microscopic hematuria with 3-5 RBCs.  Cystoscopy was reportedly negative.  Patient also had a CT scan of the abdomen and pelvis which revealed asymptomatic cholelithiasis, diffuse atherosclerosis, and enlarged prostate.   • Multiple actinic keratoses 8/2/2016 08/02/2016--patient presents with multiple actinic keratoses as well as seborrheic keratoses.  Dermatology referral given.   • Occlusive coronary artery disease, 10/08/2010--anterior MI.  EF 40%.  PTCA with drug-eluting stent proximal LAD.  10% LM.  100% diagonal 1.  30% diffuse circumflex.  30% diffuse RCA. 10/8/2010    02/23/2011--stress Cardiolite reveals mild anterior wall ischemia, ejection fraction improved to 64%.  10/08/2010--patient presented with  an anterior myocardial infarction. Heart catheterization revealed anterior/apical hypokinesis with 40% ejection fraction. Severe 80% ostial LAD stenosis. Mild 10% left main. Mild diffuse LAD. Totally occluded diagonal one. 30% diffuse circumflex. Mild 30% percent diffuse RCA. Angioplasty with drug eluding stent performed to the proximal LAD.   10/08/2010--anterior MI.  EF 40%.  PTCA with drug-eluting stent proximal LAD.  10% LM.  100% diagonal 1.  30% diffuse circumflex.  30% diffuse RCA.   • Primary osteoarthritis of right knee 10/17/2014    12/09/2014--patient was evaluated by the orthopedist. He felt that patient also had end stage osteoarthritis the right knee and will eventually need a total knee replacement. In the meantime he gave a cortisone injection in the knee and patient reports this did help. Patient also went to physical therapy which seemed to help somewhat also.  10/29/2014--MRI of the right knee reveals tricompartmental osteoarthritis that is severe in the medial compartment. There is a degenerative tear of the medial meniscus. There is a joint effusion with Baker's cyst. Intramuscular extension of the Baker's cyst into the medial head of the gastrocnemius muscle. Patient referred to orthopedics.   10/17/2014--x-ray of the right knee reveals a small suprapatellar effusion. Narrowing of the medial compartment. There is patellar spur formation. No fracture, dislocation, bone lesion is demonstrated.   10/17/2014--patient was on tour bus in [place] [employer] this past October 2013. He was trying to sit down and was in an awkward position. T   • Splenomegaly 8/2/2016 09/06/2013--CT scan reveals mild splenomegaly.  Although not in the report, the radiologist did compare the CT scan from 09/06/2013 to the one from 2010.  The spleen actually looks smaller in size, 13.5 cm compared to 15 cm previously.  In another direction, spleen is 20 cm as compared to 24 cm.  The last dimension was the maximum length.   08/04/2010--initial evaluation by the hematologist. WBCs 3.5. Differential normal. Absolute neutrophil count 2500. Hemoglobin normal at 14.2. MCV low at 83. Platelet count low at 125. IPF normal at 4.6%. Review of old labs through 1997 revealed a white blood cell count has fluctuated between 2.8 and 3.9. There is no trend downward but it is fluctuating. Platelet count has ranged between 148 and 198. MCV has been low. Liver spleen scan revealed mild splenomegaly. B12, folic acid, iron studies, rheumatoid factor, SPEP have all been normal. Felt to be mild pancytopenia secondary to splenomegaly. Patient is followed on a regular basis by the hematologist.   • Vitamin D deficiency 3/24/2016       Past Surgical History:   Procedure Laterality Date   • CARDIAC CATHETERIZATION  10/08/2010    See past medical history   • COLONOSCOPY  01/09/2012 01/09/2012--normal colonoscopy except for diffuse diverticulosis.    • COLONOSCOPY  2004 2004--colonoscopy normal.   • CORONARY ANGIOPLASTY WITH STENT PLACEMENT  10/10/2010    10/10/2010--critical 80% proximal LAD stenosis treated using a drug-coated stent. Unable to cross diagonal one.   • ESOPHAGOSCOPY / EGD  01/09/2012 01/09/2012--EGD performed for iron deficiency anemia revealed mild erythema of the distal stomach and proximal duodenum consistent with duodenitis.   • PROSTATE BIOPSY  07/07/2008 07/07/2008--negative prostate biopsy.  Patient had a history of elevated PSA and found to have benign prostatic hypertrophy.        Social History     Socioeconomic History   • Marital status:      Spouse name: Not on file   • Number of children: Not on file   • Years of education: Not on file   • Highest education level: Not on file   Occupational History   • Occupation: Retired - Sales [employer]   Social Needs   • Financial resource strain: Not hard at all   • Food insecurity:     Worry: Patient refused     Inability: Patient refused   • Transportation needs:      "Medical: Patient refused     Non-medical: Patient refused   Tobacco Use   • Smoking status: Former Smoker   • Smokeless tobacco: Never Used   • Tobacco comment: Stopped smoking in his early 20s.   Substance and Sexual Activity   • Alcohol use: Yes     Alcohol/week: 1.0 standard drinks     Types: 1 Glasses of wine per week     Drinks per session: 1 or 2     Binge frequency: Never     Comment: Socially   • Drug use: No   • Sexual activity: Yes     Partners: Female   Lifestyle   • Physical activity:     Days per week: Patient refused     Minutes per session: Patient refused   • Stress: Only a little   Relationships   • Social connections:     Talks on phone: Patient refused     Gets together: Patient refused     Attends Christian service: Patient refused     Active member of club or organization: Patient refused     Attends meetings of clubs or organizations: Patient refused     Relationship status: Patient refused       Family History   Problem Relation Age of Onset   • Coronary artery disease Mother    • Stroke Mother    • No Known Problems Father    • Cancer Maternal Grandmother    • Heart attack Maternal Grandfather    • Heart disease Maternal Grandfather    • No Known Problems Paternal Grandmother    • No Known Problems Paternal Grandfather            ECG 12 Lead  Date/Time: 2/19/2020 9:45 AM  Performed by: Natalio Morales MD  Authorized by: Natalio Morales MD   Comparison: compared with previous ECG from 2/13/2019  Similar to previous ECG  Rhythm: sinus rhythm  Rate: normal  Conduction: conduction normal  ST Segments: ST segments normal  T Waves: T waves normal  QRS axis: normal  Other: no other findings    Clinical impression: normal ECG               Objective:     /70 (BP Location: Left arm)   Pulse 58   Resp 16   Ht 175.3 cm (69\")   Wt 81.2 kg (179 lb)   SpO2 99%   BMI 26.43 kg/m²  Body mass index is 26.43 kg/m².     Physical Exam   Constitutional: He is oriented to person, place, and " time. He appears well-developed and well-nourished. No distress.   HENT:   Head: Normocephalic and atraumatic.   Eyes: Pupils are equal, round, and reactive to light. EOM are normal.   Neck: Normal range of motion. Neck supple. No thyromegaly present.   Cardiovascular: Normal rate, regular rhythm, normal heart sounds and intact distal pulses. Exam reveals no gallop and no friction rub.   No murmur heard.  Pulmonary/Chest: Effort normal and breath sounds normal. No respiratory distress. He has no wheezes. He has no rales. He exhibits no tenderness.   Abdominal: Soft. Bowel sounds are normal. He exhibits no distension. There is no guarding.   Musculoskeletal: Normal range of motion. He exhibits no edema or deformity.   Neurological: He is alert and oriented to person, place, and time. He has normal reflexes. No cranial nerve deficit.   Skin: Skin is warm and dry. No rash noted. He is not diaphoretic.   Psychiatric: He has a normal mood and affect. Judgment normal.       Review Of Data: I have obtained and reviewed prior medical records and labs.      Assessment/Plan:         Benign essential hypertension    Coronary artery disease involving native coronary artery of native heart without angina pectoris    Carotid artery plaque, 09/10/2018--mild bilateral. 08/11/2016--mild bilateral carotid artery plaque.    Pure hypercholesterolemia     Patient is stable with normal vital signs.  Coronary artery disease is stable no rest or exertional symptoms.  Switch simvastatin to Crestor despite numbers as high intensity statins benefit this particular patient.  No further testing or recommendations.    Diagnosis and plan of care discussed with patient and verbalized understanding.           Natalio Morales MD  02/19/20  9:48 AM

## 2020-02-25 ENCOUNTER — RESULTS ENCOUNTER (OUTPATIENT)
Dept: INTERNAL MEDICINE | Facility: CLINIC | Age: 80
End: 2020-02-25

## 2020-02-25 DIAGNOSIS — R16.1 SPLENOMEGALY: Chronic | ICD-10-CM

## 2020-02-25 DIAGNOSIS — D72.819 LEUKOPENIA, UNSPECIFIED TYPE: ICD-10-CM

## 2020-02-25 DIAGNOSIS — E55.9 VITAMIN D DEFICIENCY: Chronic | ICD-10-CM

## 2020-02-25 DIAGNOSIS — E78.5 HYPERLIPIDEMIA, UNSPECIFIED HYPERLIPIDEMIA TYPE: Chronic | ICD-10-CM

## 2020-02-25 DIAGNOSIS — D69.6 THROMBOCYTOPENIA (HCC): ICD-10-CM

## 2020-03-10 LAB
25(OH)D3+25(OH)D2 SERPL-MCNC: 22.7 NG/ML (ref 30–100)
ALBUMIN SERPL-MCNC: 4.3 G/DL (ref 3.5–5.2)
ALBUMIN/GLOB SERPL: 1.9 G/DL
ALP SERPL-CCNC: 58 U/L (ref 39–117)
ALT SERPL-CCNC: 24 U/L (ref 1–41)
AST SERPL-CCNC: 22 U/L (ref 1–40)
BASOPHILS # BLD AUTO: 0.01 10*3/MM3 (ref 0–0.2)
BASOPHILS NFR BLD AUTO: 0.3 % (ref 0–1.5)
BILIRUB SERPL-MCNC: 1 MG/DL (ref 0.2–1.2)
BUN SERPL-MCNC: 17 MG/DL (ref 8–23)
BUN/CREAT SERPL: 15 (ref 7–25)
CALCIUM SERPL-MCNC: 9.3 MG/DL (ref 8.6–10.5)
CHLORIDE SERPL-SCNC: 104 MMOL/L (ref 98–107)
CHOLEST SERPL-MCNC: 101 MG/DL (ref 100–199)
CK SERPL-CCNC: 42 U/L (ref 20–200)
CO2 SERPL-SCNC: 31.3 MMOL/L (ref 22–29)
CREAT SERPL-MCNC: 1.13 MG/DL (ref 0.76–1.27)
EOSINOPHIL # BLD AUTO: 0.11 10*3/MM3 (ref 0–0.4)
EOSINOPHIL NFR BLD AUTO: 3.2 % (ref 0.3–6.2)
ERYTHROCYTE [DISTWIDTH] IN BLOOD BY AUTOMATED COUNT: 13.4 % (ref 12.3–15.4)
GLOBULIN SER CALC-MCNC: 2.3 GM/DL
GLUCOSE SERPL-MCNC: 99 MG/DL (ref 65–99)
HCT VFR BLD AUTO: 39.1 % (ref 37.5–51)
HDL SERPL-SCNC: 30.8 UMOL/L
HDLC SERPL-MCNC: 32 MG/DL
HGB BLD-MCNC: 13.2 G/DL (ref 13–17.7)
IMM GRANULOCYTES # BLD AUTO: 0 10*3/MM3 (ref 0–0.05)
IMM GRANULOCYTES NFR BLD AUTO: 0 % (ref 0–0.5)
LDL SERPL QN: 19.8 NM
LDL SERPL-SCNC: 523 NMOL/L
LDL SMALL SERPL-SCNC: 407 NMOL/L
LDLC SERPL CALC-MCNC: 40 MG/DL (ref 0–99)
LYMPHOCYTES # BLD AUTO: 0.99 10*3/MM3 (ref 0.7–3.1)
LYMPHOCYTES NFR BLD AUTO: 28.4 % (ref 19.6–45.3)
MCH RBC QN AUTO: 28.3 PG (ref 26.6–33)
MCHC RBC AUTO-ENTMCNC: 33.8 G/DL (ref 31.5–35.7)
MCV RBC AUTO: 83.9 FL (ref 79–97)
MONOCYTES # BLD AUTO: 0.39 10*3/MM3 (ref 0.1–0.9)
MONOCYTES NFR BLD AUTO: 11.2 % (ref 5–12)
NEUTROPHILS # BLD AUTO: 1.99 10*3/MM3 (ref 1.7–7)
NEUTROPHILS NFR BLD AUTO: 56.9 % (ref 42.7–76)
NRBC BLD AUTO-RTO: 0 /100 WBC (ref 0–0.2)
PLATELET # BLD AUTO: 115 10*3/MM3 (ref 140–450)
POTASSIUM SERPL-SCNC: 5 MMOL/L (ref 3.5–5.2)
PROT SERPL-MCNC: 6.6 G/DL (ref 6–8.5)
RBC # BLD AUTO: 4.66 10*6/MM3 (ref 4.14–5.8)
SODIUM SERPL-SCNC: 144 MMOL/L (ref 136–145)
T3FREE SERPL-MCNC: 3.4 PG/ML (ref 2–4.4)
T4 FREE SERPL-MCNC: 1.28 NG/DL (ref 0.93–1.7)
TRIGL SERPL-MCNC: 147 MG/DL (ref 0–149)
TSH SERPL DL<=0.005 MIU/L-ACNC: 3.64 UIU/ML (ref 0.27–4.2)
WBC # BLD AUTO: 3.49 10*3/MM3 (ref 3.4–10.8)

## 2020-03-16 ENCOUNTER — OFFICE VISIT (OUTPATIENT)
Dept: INTERNAL MEDICINE | Facility: CLINIC | Age: 80
End: 2020-03-16

## 2020-03-16 VITALS
OXYGEN SATURATION: 98 % | BODY MASS INDEX: 26.75 KG/M2 | HEIGHT: 69 IN | WEIGHT: 180.6 LBS | HEART RATE: 62 BPM | DIASTOLIC BLOOD PRESSURE: 66 MMHG | SYSTOLIC BLOOD PRESSURE: 118 MMHG

## 2020-03-16 DIAGNOSIS — I25.10 CORONARY ARTERY DISEASE INVOLVING NATIVE CORONARY ARTERY OF NATIVE HEART WITHOUT ANGINA PECTORIS: Chronic | ICD-10-CM

## 2020-03-16 DIAGNOSIS — Z00.00 MEDICARE ANNUAL WELLNESS VISIT, SUBSEQUENT: Primary | ICD-10-CM

## 2020-03-16 DIAGNOSIS — Z51.81 THERAPEUTIC DRUG MONITORING: ICD-10-CM

## 2020-03-16 DIAGNOSIS — R31.29 MICROSCOPIC HEMATURIA: Chronic | ICD-10-CM

## 2020-03-16 DIAGNOSIS — N40.1 BENIGN NON-NODULAR PROSTATIC HYPERPLASIA WITH LOWER URINARY TRACT SYMPTOMS: Chronic | ICD-10-CM

## 2020-03-16 DIAGNOSIS — R97.20 ELEVATED PSA: Chronic | ICD-10-CM

## 2020-03-16 DIAGNOSIS — Z98.61 HISTORY OF PTCA: Chronic | ICD-10-CM

## 2020-03-16 DIAGNOSIS — K21.9 GASTROESOPHAGEAL REFLUX DISEASE WITHOUT ESOPHAGITIS: Chronic | ICD-10-CM

## 2020-03-16 DIAGNOSIS — I10 BENIGN ESSENTIAL HYPERTENSION: Chronic | ICD-10-CM

## 2020-03-16 DIAGNOSIS — E55.9 VITAMIN D DEFICIENCY: Chronic | ICD-10-CM

## 2020-03-16 DIAGNOSIS — R41.3 MEMORY LOSS: ICD-10-CM

## 2020-03-16 DIAGNOSIS — I65.23 ATHEROSCLEROSIS OF BOTH CAROTID ARTERIES: Chronic | ICD-10-CM

## 2020-03-16 DIAGNOSIS — R16.1 SPLENOMEGALY: Chronic | ICD-10-CM

## 2020-03-16 DIAGNOSIS — I25.2 HISTORY OF MYOCARDIAL INFARCTION: Chronic | ICD-10-CM

## 2020-03-16 DIAGNOSIS — E78.2 MIXED HYPERLIPIDEMIA: Chronic | ICD-10-CM

## 2020-03-16 PROBLEM — M65.331 TRIGGER FINGER, RIGHT MIDDLE FINGER: Status: RESOLVED | Noted: 2019-02-25 | Resolved: 2020-03-16

## 2020-03-16 PROCEDURE — 96160 PT-FOCUSED HLTH RISK ASSMT: CPT | Performed by: INTERNAL MEDICINE

## 2020-03-16 PROCEDURE — 99214 OFFICE O/P EST MOD 30 MIN: CPT | Performed by: INTERNAL MEDICINE

## 2020-03-16 PROCEDURE — G0439 PPPS, SUBSEQ VISIT: HCPCS | Performed by: INTERNAL MEDICINE

## 2020-03-16 NOTE — PROGRESS NOTES
The ABCs of the Annual Wellness Visit  Subsequent Medicare Wellness Visit    No chief complaint on file.      Subjective   History of Present Illness:  Sergey Mendoza is a 79 y.o. male who presents for a Subsequent Medicare Wellness Visit.    HEALTH RISK ASSESSMENT    Recent Hospitalizations:  No hospitalization(s) within the last year.    Current Medical Providers:  Patient Care Team:  Ramesh Blake MD as PCP - General (Internal Medicine)    Smoking Status:  Social History     Tobacco Use   Smoking Status Former Smoker   Smokeless Tobacco Never Used   Tobacco Comment    Stopped smoking in his early 20s.       Alcohol Consumption:  Social History     Substance and Sexual Activity   Alcohol Use Yes   • Alcohol/week: 1.0 standard drinks   • Types: 1 Glasses of wine per week   • Drinks per session: 1 or 2   • Binge frequency: Never    Comment: Socially       Depression Screen:   PHQ-2/PHQ-9 Depression Screening 3/16/2020   Little interest or pleasure in doing things 0   Feeling down, depressed, or hopeless 0   Trouble falling or staying asleep, or sleeping too much -   Feeling tired or having little energy -   Poor appetite or overeating -   Feeling bad about yourself - or that you are a failure or have let yourself or your family down -   Trouble concentrating on things, such as reading the newspaper or watching television -   Moving or speaking so slowly that other people could have noticed. Or the opposite - being so fidgety or restless that you have been moving around a lot more than usual -   Thoughts that you would be better off dead, or of hurting yourself in some way -   Total Score 0       Fall Risk Screen:  TARAHADI Fall Risk Assessment was completed, and patient is at HIGH risk for falls. Assessment completed on:3/16/2020    Health Habits and Functional and Cognitive Screening:  Functional & Cognitive Status 3/16/2020   Do you have difficulty preparing food and eating? No   Do you have difficulty bathing  yourself, getting dressed or grooming yourself? No   Do you have difficulty using the toilet? No   Do you have difficulty moving around from place to place? No   Do you have trouble with steps or getting out of a bed or a chair? No   Current Diet Well Balanced Diet   Dental Exam Up to date   Eye Exam Up to date   Exercise (times per week) 0 times per week   Current Exercise Activities Include None   Do you need help using the phone?  No   Are you deaf or do you have serious difficulty hearing?  No   Do you need help with transportation? No   Do you need help shopping? No   Do you need help preparing meals?  No   Do you need help with housework?  No   Do you need help with laundry? No   Do you need help taking your medications? No   Do you need help managing money? No   Do you ever drive or ride in a car without wearing a seat belt? No   Have you felt unusual stress, anger or loneliness in the last month? No   Who do you live with? Spouse   If you need help, do you have trouble finding someone available to you? No   Have you been bothered in the last four weeks by sexual problems? No   Do you have difficulty concentrating, remembering or making decisions? No         Does the patient have evidence of cognitive impairment? No    Asprin use counseling:Taking ASA appropriately as indicated    Age-appropriate Screening Schedule:  Refer to the list below for future screening recommendations based on patient's age, sex and/or medical conditions. Orders for these recommended tests are listed in the plan section. The patient has been provided with a written plan.    Health Maintenance   Topic Date Due   • LIPID PANEL  03/09/2021   • COLONOSCOPY  01/09/2022   • TDAP/TD VACCINES (2 - Td) 02/13/2027   • INFLUENZA VACCINE  Discontinued   • ZOSTER VACCINE  Discontinued          The following portions of the patient's history were reviewed and updated as appropriate: allergies, current medications, past family history, past medical  history, past social history, past surgical history and problem list.    Outpatient Medications Prior to Visit   Medication Sig Dispense Refill   • aspirin 81 MG tablet Take 81 mg by mouth Daily.     • Coenzyme Q10 (COQ10) 400 MG capsule Take 1 capsule by mouth daily. Take with a meal.     • ezetimibe (ZETIA) 10 MG tablet TAKE 1 TABLET EVERY DAY  FOR  CHOLESTEROL 90 tablet 3   • metoprolol succinate XL (TOPROL-XL) 50 MG 24 hr tablet TAKE 1 TABLET EVERY DAY 90 tablet 0   • rosuvastatin (CRESTOR) 20 MG tablet Take 1 tablet by mouth Daily. 90 tablet 3   • triamcinolone (KENALOG) 0.1 % cream Apply  topically to the appropriate area as directed 3 (Three) Times a Day. As needed rash/itching 60 g 0   • Cholecalciferol (VITAMIN D3) 5000 units capsule capsule 1 by mouth daily as directed 30 capsule    • predniSONE (DELTASONE) 10 MG tablet 6 tabs po qd x 2 days, 4 tabs po qd x 2 days, 3 tabs po qd x 2 days, 2 tabs po qd x 2 days, 1 tab po qd x 2 days 32 tablet 0     No facility-administered medications prior to visit.        Patient Active Problem List   Diagnosis   • Benign essential hypertension   • Benign prostatic hypertrophy, 07/07/2008--negative biopsy.   • Carotid artery plaque, 09/10/2018--mild bilateral. 08/11/2016--mild bilateral carotid artery plaque.   • Coronary artery disease involving native coronary artery of native heart without angina pectoris   • Diverticulosis of colon   • Gastroesophageal reflux disease without esophagitis   • Generalized osteoarthritis of multiple sites   • Hyperlipidemia   • History of myocardial infarction, 10/08/2010--patient presented with an anterior myocardial infarction.   • Primary osteoarthritis of right knee   • History of PTCA, 10/08/2010--anterior MI.  EF 40%.  PTCA with drug-eluting stent proximal LAD.  10% LM.  100% diagonal 1.  30% diffuse circumflex.  30% diffuse RCA.  2011--EF 64%   • Vitamin D deficiency   • Elevated PSA   • Internal hemorrhoids   • Therapeutic drug  "monitoring   • Splenomegaly   • Multiple actinic keratoses   • Asymptomatic cholelithiasis   • Microscopic hematuria   • Trigger finger, right middle finger       Advanced Care Planning:  ACP discussion was held with the patient during this visit. Patient has an advance directive (not in EMR), copy requested.    Review of Systems   Constitutional: Negative.    HENT: Negative.    Eyes: Negative.    Respiratory: Negative.    Cardiovascular: Negative.    Gastrointestinal: Negative.    Endocrine: Negative.    Genitourinary: Negative.    Musculoskeletal: Negative.    Skin: Negative.    Allergic/Immunologic: Negative.    Neurological: Negative.    Psychiatric/Behavioral: Negative.        Compared to one year ago, the patient feels his physical health is the same.  Compared to one year ago, the patient feels his mental health is the same.    Reviewed chart for potential of high risk medication in the elderly: yes  Reviewed chart for potential of harmful drug interactions in the elderly:yes    Objective         Vitals:    03/16/20 1435   BP: 118/66   BP Location: Left arm   Pulse: 62   SpO2: 98%   Weight: 81.9 kg (180 lb 9.6 oz)   Height: 175.3 cm (69.02\")       Body mass index is 26.66 kg/m².  Discussed the patient's BMI with him. The BMI is in the acceptable range.    Physical Exam    General: Alert and oriented x 3.  No acute distress.  Normal affect.  HEENT: Pupils equal, round, reactive to light; extraocular movements intact; sclerae nonicteric; pharynx, ear canals and TMs normal.  Neck: Without JVD, thyromegaly, bruit, or adenopathy.  Lungs: Clear to auscultation in all fields.  Heart: Regular rate and rhythm without murmur, rub, gallop, or click.  Abdomen: Soft, nontender, without hepatosplenomegaly or hernia.  Bowel sounds normal.  : Deferred.  Rectal: Deferred.  Extremities: Without clubbing, cyanosis, edema, or pulse deficit.  Neurologic: Intact without focal deficit.  Normal station and gait observed during " ingress and egress from the examination room.  Skin: Without significant lesion.  Musculoskeletal: Unremarkable.    Lab Results   Component Value Date    GLU 99 03/09/2020    CHLPL 101 03/09/2020    TRIG 147 03/09/2020        Assessment/Plan   Medicare Risks and Personalized Health Plan  CMS Preventative Services Quick Reference  Advance Directive Discussion  Cardiovascular risk  Diabetic Lab Screening   Prostate Cancer Screening     The above risks/problems have been discussed with the patient.  Pertinent information has been shared with the patient in the After Visit Summary.  Follow up plans and orders are seen below in the Assessment/Plan Section.    Diagnoses and all orders for this visit:    1. Medicare annual wellness visit, subsequent (Primary)    2. Hyperlipidemia  -     CK; Future  -     Comprehensive Metabolic Panel; Future  -     NMR LipoProfile; Future  -     TSH; Future  -     T4, Free; Future  -     T3, Free; Future    3. Coronary artery disease involving native coronary artery of native heart without angina pectoris    4. History of myocardial infarction, 10/08/2010--patient presented with an anterior myocardial infarction.    5. History of PTCA, 10/08/2010--anterior MI.  EF 40%.  PTCA with drug-eluting stent proximal LAD.  10% LM.  100% diagonal 1.  30% diffuse circumflex.  30% diffuse RCA.  2011--EF 64%    6. Benign essential hypertension    7. Benign prostatic hypertrophy, 07/07/2008--negative biopsy.  -     PSA DIAGNOSTIC; Future    8. Carotid artery plaque, 09/10/2018--mild bilateral. 08/11/2016--mild bilateral carotid artery plaque.    9. Gastroesophageal reflux disease without esophagitis    10. Elevated PSA    11. Splenomegaly  -     CBC (No Diff); Future    12. Microscopic hematuria  -     Urinalysis With Microscopic If Indicated (No Culture) - Urine, Clean Catch; Future    13. Vitamin D deficiency  -     Vitamin D 25 Hydroxy; Future  -     Cholecalciferol (VITAMIN D3) 125 MCG (5000 UT)  capsule capsule; 1 by mouth daily as directed  Dispense: 30 capsule    14. Therapeutic drug monitoring  -     CBC (No Diff); Future      Follow Up:  Return in about 1 year (around 3/16/2021) for Next scheduled follow up with lab prior.     An After Visit Summary and PPPS were given to the patient.

## 2020-03-16 NOTE — PROGRESS NOTES
03/16/2020    Patient Information  Sergey Mendoza                                                                                          [address]  [facility] [address]      1940  [unfilled]  There is no work phone number on file.    Chief Complaint:     Subsequent Medicare wellness visit.  Follow-up recent lab work in order to monitor several chronic medical issues listed in history of present illness.  No new acute complaints.    History of Present Illness:    Patient with a history of hyperlipidemia, occlusive coronary artery disease, history of myocardial infarction, history of PTCA, hypertension, BPH, carotid artery plaque, esophageal reflux, elevated PSA, splenomegaly, microscopic hematuria.  He presents today for subsequent Medicare wellness visit.  Patient also had lab work in order to monitor his chronic medical issues.  His past medical history reviewed and updated were necessary including health maintenance parameters.  This reveals he is up-to-date or else accounted for.    Note that patient was recently evaluated by the cardiologist and his simvastatin was changed to Crestor for reasons that are unclear to me given the fact that his NMR profile is excellent.    Review of Systems   Constitution: Negative.   HENT: Negative.    Eyes: Negative.    Cardiovascular: Negative.    Respiratory: Negative.    Endocrine: Negative.    Hematologic/Lymphatic: Negative.    Skin: Negative.    Musculoskeletal: Negative.    Gastrointestinal: Negative.    Genitourinary: Negative.    Neurological: Negative.    Psychiatric/Behavioral: Positive for memory loss.   Allergic/Immunologic: Negative.        Active Problems:    Patient Active Problem List   Diagnosis   • Benign essential hypertension   • Benign prostatic hypertrophy, 07/07/2008--negative biopsy.   • Carotid artery plaque, 09/10/2018--mild bilateral. 08/11/2016--mild bilateral carotid artery plaque.   • Coronary artery disease involving  native coronary artery of native heart without angina pectoris   • Diverticulosis of colon   • Gastroesophageal reflux disease without esophagitis   • Generalized osteoarthritis of multiple sites   • Hyperlipidemia   • History of myocardial infarction, 10/08/2010--patient presented with an anterior myocardial infarction.   • Primary osteoarthritis of right knee   • History of PTCA, 10/08/2010--anterior MI.  EF 40%.  PTCA with drug-eluting stent proximal LAD.  10% LM.  100% diagonal 1.  30% diffuse circumflex.  30% diffuse RCA.  2011--EF 64%   • Vitamin D deficiency   • Elevated PSA   • Internal hemorrhoids   • Therapeutic drug monitoring   • Splenomegaly   • Multiple actinic keratoses   • Asymptomatic cholelithiasis   • Microscopic hematuria   • Memory loss         Past Medical History:   Diagnosis Date   • Asymptomatic cholelithiasis 2/19/2018   • Benign essential hypertension 3/24/2006    Diagnosed approximately March 2006.   • Benign prostatic hypertrophy, 07/07/2008--negative biopsy. 7/7/2008    Patient had a history of elevated PSA and found to have benign prostatic hypertrophy.   07/07/2008--negative prostate biopsy.   • Carotid artery plaque, 09/10/2018--mild bilateral. 08/11/2016--mild bilateral carotid artery plaque. 4/21/2009    September 10, 2018--carotid Doppler study reveals mild bilateral carotid artery plaque.  08/11/2016--carotid Doppler study reveals mild bilateral carotid plaque.  05/28/2014--vascular screen reveals mild bilateral carotid plaque, negative for AAA, negative for PAD.   03/30/2011--vascular screen revealed mild bilateral carotid plaque, negative for abdominal aneurysm, negative for PAD.   04/21/2009-   • Diverticulosis of colon 1/9/2012 01/09/2012--normal colonoscopy except for diffuse diverticulosis.   2004--colonoscopy normal.   • Elevated PSA 11/6/2013 02/13/2017--patient seen in follow-up and reports he is followed on a yearly basis by the urologist.  Last follow-up was in  December 2016 and his PSA was less than 5.  07/28/2016--PSA elevated at 4.88.  03/19/2014--he was seen in followup and his PSA was back down to 3.9. Patient was not having any urinary symptoms.  12/11/2013--patient was evaluated by the urologist and he elected to just observe the PSA.   11/06/2013--patient seen in followup in his PSA returned even higher at 5.7. He was referred back to urology.   11/06/2013--PSA returned elevated at 4.3. Patient did have some urinary symptoms consisting of periods of weak stream and dribbling. He had never been treated for chronic prostatitis. I treated him with ciprofloxacin 500 mg for 30 days.       • Gastroesophageal reflux disease without esophagitis 3/24/2016   • Generalized osteoarthritis of multiple sites 3/24/2016   • History of cardiac catheterization 10/8/2010    10/08/2010--patient presented with an anterior myocardial infarction. Heart catheterization revealed anterior/apical hypokinesis with 40% ejection fraction. Severe 80% ostial LAD stenosis. Mild 10% left main. Mild diffuse LAD. Totally occluded diagonal one. 30% diffuse circumflex. Mild 30% percent diffuse RCA. Angioplasty with drug eluding stent performed to the proximal LAD.    • History of Chronic Duodenitis 1/9/2012 01/09/2012--EGD revealed mild distal erythema of the stomach and proximal duodenum.   • History of closed Colles' fracture of left radius 05/05/1953    13 years of age.   • History of iron deficiency anemia 6/23/2012 02/07/2017--hemoglobin normal at 14.2, hematocrit normal at 45.1.  Serum iron normal at 105.  Iron saturation normal at 30%.  Resolve this issue.  08/02/2016--patient seen in follow-up.  White count is low at 3.66.  Hemoglobin low at 13.3.  Hematocrit normal at 41.8.  Platelets low at 109.  Homocystine and methylmalonic acid are normal.  Serum iron normal at 98.  Iron saturation normal at 28.  Patient is taking iron sulfate 325 mg daily.  10/13/2013--CBC was entirely normal,  anemia resolved we'll continue to monitor.  06/23/2012--patient treated for iron deficiency anemia caused by duodenitis which was revealed per EGD 01/09/2012.    • History of myocardial infarction, 10/08/2010--patient presented with an anterior myocardial infarction. 10/8/2010    Stress test 02/23/2011--mild anterior wall ischemia, ejection fraction improved to 64%.  10/08/2010--patient presented with an anterior myocardial infarction. Heart catheterization revealed anterior/apical hypokinesis with 40% ejection fraction. Severe 80% ostial LAD stenosis. Mild 10% left main. Mild diffuse LAD. Totally occluded diagonal one. 30% diffuse circumflex. Mild 30% percent diffuse RCA. Angioplasty with drug eluding stent performed to the proximal LAD.    • History of PTCA, 10/08/2010--anterior MI.  EF 40%.  PTCA with drug-eluting stent proximal LAD.  10% LM.  100% diagonal 1.  30% diffuse circumflex.  30% diffuse RCA.  2011--EF 64% 10/8/2010    Stress test 02/23/2011--mild anterior wall ischemia, ejection fraction improved to 64%.  10/08/2010--patient presented with an anterior myocardial infarction. Heart catheterization revealed anterior/apical hypokinesis with 40% ejection fraction. Severe 80% ostial LAD stenosis. Mild 10% left main. Mild diffuse LAD. Totally occluded diagonal one. 30% diffuse circumflex. Mild 30% percent diffuse RCA. Angioplasty with drug eluding stent performed to the proximal LAD.    • History of Tear of medial meniscus of right knee 10/17/2014    12/09/2014--patient was evaluated by the orthopedist. He felt that patient also had end stage osteoarthritis the right knee and will eventually need a total knee replacement. In the meantime he gave a cortisone injection in the knee and patient reports this did help. Patient also went to physical therapy which seemed to help somewhat also.   10/29/2014--MRI of the right knee reveals tricompartmental osteoarthritis that is severe in the medial compartment. There  is a degenerative tear of the medial meniscus. There is a joint effusion with Baker's cyst. Intramuscular extension of the Baker's cyst into the medial head of the gastrocnemius muscle. Patient referred to orthopedics.   10/17/2014--x-ray of the right knee reveals a small suprapatellar effusion. Narrowing of the medial compartment. There is patellar spur formation. No fracture, dislocation, bone lesion is demonstrated.   10/17/2014--patient was on tour bus in [place] this past October 2013. He was trying to sit down and was in an awkward position.    • Hyperlipidemia 3/6/2011    03/16/2011--treatment for hyperlipidemia and initiated.   • Internal hemorrhoids 3/24/2016   • Microscopic hematuria 2/19/2018 12/20/2017--patient was evaluated by the urologist for a routine follow-up elevated PSA.  He was noted to have microscopic hematuria with 3-5 RBCs.  Cystoscopy was reportedly negative.  Patient also had a CT scan of the abdomen and pelvis which revealed asymptomatic cholelithiasis, diffuse atherosclerosis, and enlarged prostate.   • Multiple actinic keratoses 8/2/2016 08/02/2016--patient presents with multiple actinic keratoses as well as seborrheic keratoses.  Dermatology referral given.   • Occlusive coronary artery disease, 10/08/2010--anterior MI.  EF 40%.  PTCA with drug-eluting stent proximal LAD.  10% LM.  100% diagonal 1.  30% diffuse circumflex.  30% diffuse RCA. 10/8/2010    02/23/2011--stress Cardiolite reveals mild anterior wall ischemia, ejection fraction improved to 64%.  10/08/2010--patient presented with an anterior myocardial infarction. Heart catheterization revealed anterior/apical hypokinesis with 40% ejection fraction. Severe 80% ostial LAD stenosis. Mild 10% left main. Mild diffuse LAD. Totally occluded diagonal one. 30% diffuse circumflex. Mild 30% percent diffuse RCA. Angioplasty with drug eluding stent performed to the proximal LAD.   10/08/2010--anterior MI.  EF 40%.  PTCA with  drug-eluting stent proximal LAD.  10% LM.  100% diagonal 1.  30% diffuse circumflex.  30% diffuse RCA.   • Primary osteoarthritis of right knee 10/17/2014    12/09/2014--patient was evaluated by the orthopedist. He felt that patient also had end stage osteoarthritis the right knee and will eventually need a total knee replacement. In the meantime he gave a cortisone injection in the knee and patient reports this did help. Patient also went to physical therapy which seemed to help somewhat also.  10/29/2014--MRI of the right knee reveals tricompartmental osteoarthritis that is severe in the medial compartment. There is a degenerative tear of the medial meniscus. There is a joint effusion with Baker's cyst. Intramuscular extension of the Baker's cyst into the medial head of the gastrocnemius muscle. Patient referred to orthopedics.   10/17/2014--x-ray of the right knee reveals a small suprapatellar effusion. Narrowing of the medial compartment. There is patellar spur formation. No fracture, dislocation, bone lesion is demonstrated.   10/17/2014--patient was on tour bus in [place] this past October 2013. He was trying to sit down and was in an awkward position. T   • Splenomegaly 8/2/2016 09/06/2013--CT scan reveals mild splenomegaly.  Although not in the report, the radiologist did compare the CT scan from 09/06/2013 to the one from 2010.  The spleen actually looks smaller in size, 13.5 cm compared to 15 cm previously.  In another direction, spleen is 20 cm as compared to 24 cm.  The last dimension was the maximum length.  08/04/2010--initial evaluation by the hematologist. WBCs 3.5. Differential normal. Absolute neutrophil count 2500. Hemoglobin normal at 14.2. MCV low at 83. Platelet count low at 125. IPF normal at 4.6%. Review of old labs through 1997 revealed a white blood cell count has fluctuated between 2.8 and 3.9. There is no trend downward but it is fluctuating. Platelet count has ranged between 148 and  198. MCV has been low. Liver spleen scan revealed mild splenomegaly. B12, folic acid, iron studies, rheumatoid factor, SPEP have all been normal. Felt to be mild pancytopenia secondary to splenomegaly. Patient is followed on a regular basis by the hematologist.   • Vitamin D deficiency 3/24/2016         Past Surgical History:   Procedure Laterality Date   • CARDIAC CATHETERIZATION  10/08/2010    See past medical history   • COLONOSCOPY  01/09/2012 01/09/2012--normal colonoscopy except for diffuse diverticulosis.    • COLONOSCOPY  2004 2004--colonoscopy normal.   • CORONARY ANGIOPLASTY WITH STENT PLACEMENT  10/10/2010    10/10/2010--critical 80% proximal LAD stenosis treated using a drug-coated stent. Unable to cross diagonal one.   • ESOPHAGOSCOPY / EGD  01/09/2012 01/09/2012--EGD performed for iron deficiency anemia revealed mild erythema of the distal stomach and proximal duodenum consistent with duodenitis.   • PROSTATE BIOPSY  07/07/2008 07/07/2008--negative prostate biopsy.  Patient had a history of elevated PSA and found to have benign prostatic hypertrophy.          Allergies   Allergen Reactions   • Penicillins    • Sulfa Antibiotics            Current Outpatient Medications:   •  aspirin 81 MG tablet, Take 81 mg by mouth Daily., Disp: , Rfl:   •  Coenzyme Q10 (COQ10) 400 MG capsule, Take 1 capsule by mouth daily. Take with a meal., Disp: , Rfl:   •  ezetimibe (ZETIA) 10 MG tablet, TAKE 1 TABLET EVERY DAY  FOR  CHOLESTEROL, Disp: 90 tablet, Rfl: 3  •  metoprolol succinate XL (TOPROL-XL) 50 MG 24 hr tablet, TAKE 1 TABLET EVERY DAY, Disp: 90 tablet, Rfl: 0  •  rosuvastatin (CRESTOR) 20 MG tablet, Take 1 tablet by mouth Daily., Disp: 90 tablet, Rfl: 3  •  triamcinolone (KENALOG) 0.1 % cream, Apply  topically to the appropriate area as directed 3 (Three) Times a Day. As needed rash/itching, Disp: 60 g, Rfl: 0  •  Cholecalciferol (VITAMIN D3) 125 MCG (5000 UT) capsule capsule, 1 by mouth daily as  "directed, Disp: 30 capsule, Rfl:       Family History   Problem Relation Age of Onset   • Coronary artery disease Mother    • Stroke Mother    • No Known Problems Father    • Cancer Maternal Grandmother    • Heart attack Maternal Grandfather    • Heart disease Maternal Grandfather    • No Known Problems Paternal Grandmother    • No Known Problems Paternal Grandfather          Social History     Socioeconomic History   • Marital status:      Spouse name: Not on file   • Number of children: Not on file   • Years of education: Not on file   • Highest education level: Not on file   Occupational History   • Occupation: Retired - Sales and Marketing   Social Needs   • Financial resource strain: Not hard at all   • Food insecurity:     Worry: Patient refused     Inability: Patient refused   • Transportation needs:     Medical: Patient refused     Non-medical: Patient refused   Tobacco Use   • Smoking status: Former Smoker   • Smokeless tobacco: Never Used   • Tobacco comment: Stopped smoking in his early 20s.   Substance and Sexual Activity   • Alcohol use: Yes     Alcohol/week: 1.0 standard drinks     Types: 1 Glasses of wine per week     Drinks per session: 1 or 2     Binge frequency: Never     Comment: Socially   • Drug use: No   • Sexual activity: Yes     Partners: Female   Lifestyle   • Physical activity:     Days per week: Patient refused     Minutes per session: Patient refused   • Stress: Only a little   Relationships   • Social connections:     Talks on phone: Patient refused     Gets together: Patient refused     Attends [employer] service: Patient refused     Active member of club or organization: Patient refused     Attends meetings of clubs or organizations: Patient refused     Relationship status: Patient refused         Vitals:    03/16/20 1435   BP: 118/66   BP Location: Left arm   Pulse: 62   SpO2: 98%   Weight: 81.9 kg (180 lb 9.6 oz)   Height: 175.3 cm (69.02\")        Body mass index is 26.66 " kg/m².      Physical Exam:    General: Alert and oriented x 3.  No acute distress.  Normal affect.  HEENT: Pupils equal, round, reactive to light; extraocular movements intact; sclerae nonicteric; pharynx, ear canals and TMs normal.  Neck: Without JVD, thyromegaly, bruit, or adenopathy.  Lungs: Clear to auscultation in all fields.  Heart: Regular rate and rhythm without murmur, rub, gallop, or click.  Abdomen: Soft, nontender, without hepatosplenomegaly or hernia.  Bowel sounds normal.  : Deferred.  Rectal: Deferred.  Extremities: Without clubbing, cyanosis, edema, or pulse deficit.  Neurologic: Intact without focal deficit.  Normal station and gait observed during ingress and egress from the examination room.  Skin: Without significant lesion.  Musculoskeletal: Unremarkable.    Lab/other results:    NMR reveals a total cholesterol of 101.  Triglycerides normal at 147.  LDL particle number excellent at 523.  Small LDL particle number excellent at 407.  HDL particle number is borderline at 30.8.  CBC normal except platelets low at 115.  CMP normal.  Thyroid function test normal.  Vitamin D is low at 22.7.  CPK normal.    Assessment/Plan:     Diagnosis Plan   1. Medicare annual wellness visit, subsequent     2. Hyperlipidemia  CK    Comprehensive Metabolic Panel    NMR LipoProfile    TSH    T4, Free    T3, Free   3. Coronary artery disease involving native coronary artery of native heart without angina pectoris     4. History of myocardial infarction, 10/08/2010--patient presented with an anterior myocardial infarction.     5. History of PTCA, 10/08/2010--anterior MI.  EF 40%.  PTCA with drug-eluting stent proximal LAD.  10% LM.  100% diagonal 1.  30% diffuse circumflex.  30% diffuse RCA.  2011--EF 64%     6. Benign essential hypertension     7. Benign prostatic hypertrophy, 07/07/2008--negative biopsy.  PSA DIAGNOSTIC   8. Carotid artery plaque, 09/10/2018--mild bilateral. 08/11/2016--mild bilateral carotid artery  plaque.     9. Gastroesophageal reflux disease without esophagitis     10. Elevated PSA     11. Splenomegaly  CBC (No Diff)   12. Microscopic hematuria  Urinalysis With Microscopic If Indicated (No Culture) - Urine, Clean Catch   13. Vitamin D deficiency  Vitamin D 25 Hydroxy    Cholecalciferol (VITAMIN D3) 125 MCG (5000 UT) capsule capsule   14. Therapeutic drug monitoring  CBC (No Diff)   15. Memory loss       The subsequent Medicare wellness visit is documented on separate note.  Patient has hyperlipidemia which is under excellent control which is important given his known coronary artery disease.  Patient also has carotid artery plaque and is up-to-date on his carotid Doppler study until later this year.  His blood pressure is well controlled.  Patient has BPH and a history of elevated PSA and is followed by the urologist.  Esophageal reflux is currently not an issue.  Patient was noted to have mild splenomegaly on the CT scan back in 2018 but this was very similar to the mild splenomegaly noted in 2013 and therefore does not need further evaluation.  His vitamin D is low and patient needs to start taking vitamin D supplementation or at least be more consistent with that.    Plan is as follows: No change in current medical regimen although I have encouraged patient to take 5000 units of vitamin D on a regular basis.  I will have him follow-up in 1 year with lab prior and this will also be subsequent Medicare wellness visit.  Otherwise, patient will follow-up as needed.        Procedures

## 2020-04-02 DIAGNOSIS — E78.5 HYPERLIPIDEMIA, UNSPECIFIED HYPERLIPIDEMIA TYPE: Chronic | ICD-10-CM

## 2020-04-02 RX ORDER — EZETIMIBE 10 MG/1
TABLET ORAL
Qty: 90 TABLET | Refills: 1 | Status: SHIPPED | OUTPATIENT
Start: 2020-04-02 | End: 2021-02-23

## 2020-04-20 RX ORDER — METOPROLOL SUCCINATE 50 MG/1
TABLET, EXTENDED RELEASE ORAL
Qty: 90 TABLET | Refills: 2 | Status: SHIPPED | OUTPATIENT
Start: 2020-04-20 | End: 2021-02-19

## 2020-07-09 RX ORDER — ROSUVASTATIN CALCIUM 20 MG/1
20 TABLET, COATED ORAL DAILY
Qty: 90 TABLET | Refills: 3 | Status: SHIPPED | OUTPATIENT
Start: 2020-07-09 | End: 2021-03-24 | Stop reason: SDUPTHER

## 2020-07-09 NOTE — TELEPHONE ENCOUNTER
Caller: Sergey Mendoza    Relationship: Self    Best call back number: [phone]    Medication needed:   Requested Prescriptions     Pending Prescriptions Disp Refills   • rosuvastatin (CRESTOR) 20 MG tablet 90 tablet 3     Sig: Take 1 tablet by mouth Daily.       What is the patient's preferred pharmacy: [facility] - [facility] [address] - [phone]  - [phone] FX

## 2021-02-19 RX ORDER — METOPROLOL SUCCINATE 50 MG/1
TABLET, EXTENDED RELEASE ORAL
Qty: 90 TABLET | Refills: 0 | Status: SHIPPED | OUTPATIENT
Start: 2021-02-19 | End: 2021-03-24 | Stop reason: SDUPTHER

## 2021-02-23 ENCOUNTER — OFFICE VISIT (OUTPATIENT)
Dept: CARDIOLOGY | Facility: CLINIC | Age: 81
End: 2021-02-23

## 2021-02-23 VITALS
DIASTOLIC BLOOD PRESSURE: 90 MMHG | OXYGEN SATURATION: 98 % | HEIGHT: 69 IN | BODY MASS INDEX: 25.18 KG/M2 | HEART RATE: 75 BPM | SYSTOLIC BLOOD PRESSURE: 130 MMHG | WEIGHT: 170 LBS

## 2021-02-23 DIAGNOSIS — I25.10 CORONARY ARTERY DISEASE INVOLVING NATIVE CORONARY ARTERY OF NATIVE HEART WITHOUT ANGINA PECTORIS: Chronic | ICD-10-CM

## 2021-02-23 DIAGNOSIS — E78.00 HYPERCHOLESTEROLEMIA: Primary | ICD-10-CM

## 2021-02-23 DIAGNOSIS — I65.23 ATHEROSCLEROSIS OF BOTH CAROTID ARTERIES: Chronic | ICD-10-CM

## 2021-02-23 PROCEDURE — 93000 ELECTROCARDIOGRAM COMPLETE: CPT | Performed by: INTERNAL MEDICINE

## 2021-02-23 PROCEDURE — 99214 OFFICE O/P EST MOD 30 MIN: CPT | Performed by: INTERNAL MEDICINE

## 2021-02-23 NOTE — PROGRESS NOTES
PATIENTINFORMATION    Date of Office Visit: 2021  Encounter Provider: Natalio Morales MD  Place of Service: [facility] CARDIOLOGY  Patient Name: Sergey Mendoza  : 1940    Subjective:     Encounter Date:2021      Patient ID: Sergey Mendoza is a 80 y.o. male.    Chief Complaint   Patient presents with   • Coronary Artery Disease     annual visit    • Benign Hypertension   • Carotid Plaque     HPI  Mr. Mendoza is an 80 years old man with past medical history of coronary artery disease and stenting of LAD in  came to cardiology clinic for follow-up visit.  Since last visit a year ago he denied having any new symptoms and specifically denied any rest or exertional chest pain, shortness of breath.  No palpitation, presyncope or syncope, orthopnea, PND or extremity swelling.  No side effects from any of his medications and he is very compliant with treatment regimen.  He still admits he has significant bilateral knee pain and cannot walk for long but has not seen his surgeon's yet and takes ibuprofen occasionally for pain control.  No admission or ER visits since last visit.      ROS   All systems reviewed and negative except as noted in HPI.    Past Medical History:   Diagnosis Date   • Asymptomatic cholelithiasis 2018   • Benign essential hypertension 3/24/2006    Diagnosed approximately 2006.   • Benign prostatic hypertrophy, 2008--negative biopsy. 2008    Patient had a history of elevated PSA and found to have benign prostatic hypertrophy.   2008--negative prostate biopsy.   • Carotid artery plaque, 09/10/2018--mild bilateral. 2016--mild bilateral carotid artery plaque. 2009    September 10, 2018--carotid Doppler study reveals mild bilateral carotid artery plaque.  2016--carotid Doppler study reveals mild bilateral carotid plaque.  2014--vascular screen reveals mild bilateral carotid plaque, negative for AAA, negative for PAD.    03/30/2011--vascular screen revealed mild bilateral carotid plaque, negative for abdominal aneurysm, negative for PAD.   04/21/2009-   • Diverticulosis of colon 1/9/2012 01/09/2012--normal colonoscopy except for diffuse diverticulosis.   2004--colonoscopy normal.   • Elevated PSA 11/6/2013 02/13/2017--patient seen in follow-up and reports he is followed on a yearly basis by the urologist.  Last follow-up was in December 2016 and his PSA was less than 5.  07/28/2016--PSA elevated at 4.88.  03/19/2014--he was seen in followup and his PSA was back down to 3.9. Patient was not having any urinary symptoms.  12/11/2013--patient was evaluated by the urologist and he elected to just observe the PSA.   11/06/2013--patient seen in followup in his PSA returned even higher at 5.7. He was referred back to urology.   11/06/2013--PSA returned elevated at 4.3. Patient did have some urinary symptoms consisting of periods of weak stream and dribbling. He had never been treated for chronic prostatitis. I treated him with ciprofloxacin 500 mg for 30 days.       • Gastroesophageal reflux disease without esophagitis 3/24/2016   • Generalized osteoarthritis of multiple sites 3/24/2016   • History of cardiac catheterization 10/8/2010    10/08/2010--patient presented with an anterior myocardial infarction. Heart catheterization revealed anterior/apical hypokinesis with 40% ejection fraction. Severe 80% ostial LAD stenosis. Mild 10% left main. Mild diffuse LAD. Totally occluded diagonal one. 30% diffuse circumflex. Mild 30% percent diffuse RCA. Angioplasty with drug eluding stent performed to the proximal LAD.    • History of Chronic Duodenitis 1/9/2012 01/09/2012--EGD revealed mild distal erythema of the stomach and proximal duodenum.   • History of closed Colles' fracture of left radius 05/05/1953    13 years of age.   • History of iron deficiency anemia 6/23/2012 02/07/2017--hemoglobin normal at 14.2, hematocrit normal at  45.1.  Serum iron normal at 105.  Iron saturation normal at 30%.  Resolve this issue.  08/02/2016--patient seen in follow-up.  White count is low at 3.66.  Hemoglobin low at 13.3.  Hematocrit normal at 41.8.  Platelets low at 109.  Homocystine and methylmalonic acid are normal.  Serum iron normal at 98.  Iron saturation normal at 28.  Patient is taking iron sulfate 325 mg daily.  10/13/2013--CBC was entirely normal, anemia resolved we'll continue to monitor.  06/23/2012--patient treated for iron deficiency anemia caused by duodenitis which was revealed per EGD 01/09/2012.    • History of myocardial infarction, 10/08/2010--patient presented with an anterior myocardial infarction. 10/8/2010    Stress test 02/23/2011--mild anterior wall ischemia, ejection fraction improved to 64%.  10/08/2010--patient presented with an anterior myocardial infarction. Heart catheterization revealed anterior/apical hypokinesis with 40% ejection fraction. Severe 80% ostial LAD stenosis. Mild 10% left main. Mild diffuse LAD. Totally occluded diagonal one. 30% diffuse circumflex. Mild 30% percent diffuse RCA. Angioplasty with drug eluding stent performed to the proximal LAD.    • History of PTCA, 10/08/2010--anterior MI.  EF 40%.  PTCA with drug-eluting stent proximal LAD.  10% LM.  100% diagonal 1.  30% diffuse circumflex.  30% diffuse RCA.  2011--EF 64% 10/8/2010    Stress test 02/23/2011--mild anterior wall ischemia, ejection fraction improved to 64%.  10/08/2010--patient presented with an anterior myocardial infarction. Heart catheterization revealed anterior/apical hypokinesis with 40% ejection fraction. Severe 80% ostial LAD stenosis. Mild 10% left main. Mild diffuse LAD. Totally occluded diagonal one. 30% diffuse circumflex. Mild 30% percent diffuse RCA. Angioplasty with drug eluding stent performed to the proximal LAD.    • History of Tear of medial meniscus of right knee 10/17/2014    12/09/2014--patient was evaluated by the  orthopedist. He felt that patient also had end stage osteoarthritis the right knee and will eventually need a total knee replacement. In the meantime he gave a cortisone injection in the knee and patient reports this did help. Patient also went to physical therapy which seemed to help somewhat also.   10/29/2014--MRI of the right knee reveals tricompartmental osteoarthritis that is severe in the medial compartment. There is a degenerative tear of the medial meniscus. There is a joint effusion with Baker's cyst. Intramuscular extension of the Baker's cyst into the medial head of the gastrocnemius muscle. Patient referred to orthopedics.   10/17/2014--x-ray of the right knee reveals a small suprapatellar effusion. Narrowing of the medial compartment. There is patellar spur formation. No fracture, dislocation, bone lesion is demonstrated.   10/17/2014--patient was on tour bus in [place] this past October 2013. He was trying to sit down and was in an awkward position.    • Hyperlipidemia 3/6/2011    03/16/2011--treatment for hyperlipidemia and initiated.   • Internal hemorrhoids 3/24/2016   • Microscopic hematuria 2/19/2018 12/20/2017--patient was evaluated by the urologist for a routine follow-up elevated PSA.  He was noted to have microscopic hematuria with 3-5 RBCs.  Cystoscopy was reportedly negative.  Patient also had a CT scan of the abdomen and pelvis which revealed asymptomatic cholelithiasis, diffuse atherosclerosis, and enlarged prostate.   • Multiple actinic keratoses 8/2/2016 08/02/2016--patient presents with multiple actinic keratoses as well as seborrheic keratoses.  Dermatology referral given.   • Occlusive coronary artery disease, 10/08/2010--anterior MI.  EF 40%.  PTCA with drug-eluting stent proximal LAD.  10% LM.  100% diagonal 1.  30% diffuse circumflex.  30% diffuse RCA. 10/8/2010    02/23/2011--stress Cardiolite reveals mild anterior wall ischemia, ejection fraction improved to 64%.   10/08/2010--patient presented with an anterior myocardial infarction. Heart catheterization revealed anterior/apical hypokinesis with 40% ejection fraction. Severe 80% ostial LAD stenosis. Mild 10% left main. Mild diffuse LAD. Totally occluded diagonal one. 30% diffuse circumflex. Mild 30% percent diffuse RCA. Angioplasty with drug eluding stent performed to the proximal LAD.   10/08/2010--anterior MI.  EF 40%.  PTCA with drug-eluting stent proximal LAD.  10% LM.  100% diagonal 1.  30% diffuse circumflex.  30% diffuse RCA.   • Primary osteoarthritis of right knee 10/17/2014    12/09/2014--patient was evaluated by the orthopedist. He felt that patient also had end stage osteoarthritis the right knee and will eventually need a total knee replacement. In the meantime he gave a cortisone injection in the knee and patient reports this did help. Patient also went to physical therapy which seemed to help somewhat also.  10/29/2014--MRI of the right knee reveals tricompartmental osteoarthritis that is severe in the medial compartment. There is a degenerative tear of the medial meniscus. There is a joint effusion with Baker's cyst. Intramuscular extension of the Baker's cyst into the medial head of the gastrocnemius muscle. Patient referred to orthopedics.   10/17/2014--x-ray of the right knee reveals a small suprapatellar effusion. Narrowing of the medial compartment. There is patellar spur formation. No fracture, dislocation, bone lesion is demonstrated.   10/17/2014--patient was on tour bus in [place] this past October 2013. He was trying to sit down and was in an awkward position. T   • Splenomegaly 8/2/2016 09/06/2013--CT scan reveals mild splenomegaly.  Although not in the report, the radiologist did compare the CT scan from 09/06/2013 to the one from 2010.  The spleen actually looks smaller in size, 13.5 cm compared to 15 cm previously.  In another direction, spleen is 20 cm as compared to 24 cm.  The last dimension  was the maximum length.  08/04/2010--initial evaluation by the hematologist. WBCs 3.5. Differential normal. Absolute neutrophil count 2500. Hemoglobin normal at 14.2. MCV low at 83. Platelet count low at 125. IPF normal at 4.6%. Review of old labs through 1997 revealed a white blood cell count has fluctuated between 2.8 and 3.9. There is no trend downward but it is fluctuating. Platelet count has ranged between 148 and 198. MCV has been low. Liver spleen scan revealed mild splenomegaly. B12, folic acid, iron studies, rheumatoid factor, SPEP have all been normal. Felt to be mild pancytopenia secondary to splenomegaly. Patient is followed on a regular basis by the hematologist.   • Vitamin D deficiency 3/24/2016       Past Surgical History:   Procedure Laterality Date   • CARDIAC CATHETERIZATION  10/08/2010    See past medical history   • COLONOSCOPY  01/09/2012 01/09/2012--normal colonoscopy except for diffuse diverticulosis.    • COLONOSCOPY  2004 2004--colonoscopy normal.   • CORONARY ANGIOPLASTY WITH STENT PLACEMENT  10/10/2010    10/10/2010--critical 80% proximal LAD stenosis treated using a drug-coated stent. Unable to cross diagonal one.   • ESOPHAGOSCOPY / EGD  01/09/2012 01/09/2012--EGD performed for iron deficiency anemia revealed mild erythema of the distal stomach and proximal duodenum consistent with duodenitis.   • PROSTATE BIOPSY  07/07/2008 07/07/2008--negative prostate biopsy.  Patient had a history of elevated PSA and found to have benign prostatic hypertrophy.        Social History     Socioeconomic History   • Marital status:      Spouse name: Not on file   • Number of children: Not on file   • Years of education: Not on file   • Highest education level: Not on file   Occupational History   • Occupation: Retired - Sales [employer]   Social Needs   • Financial resource strain: Not hard at all   • Food insecurity     Worry: Patient refused     Inability: Patient refused   •  "Transportation needs     Medical: Patient refused     Non-medical: Patient refused   Tobacco Use   • Smoking status: Former Smoker   • Smokeless tobacco: Never Used   • Tobacco comment: Stopped smoking in his early 20s.   Substance and Sexual Activity   • Alcohol use: Yes     Alcohol/week: 1.0 standard drinks     Types: 1 Glasses of wine per week     Drinks per session: 1 or 2     Binge frequency: Never     Comment: Socially   • Drug use: No   • Sexual activity: Yes     Partners: Female   Lifestyle   • Physical activity     Days per week: Patient refused     Minutes per session: Patient refused   • Stress: Only a little   Relationships   • Social connections     Talks on phone: Patient refused     Gets together: Patient refused     Attends Mosque service: Patient refused     Active member of club or organization: Patient refused     Attends meetings of clubs or organizations: Patient refused     Relationship status: Patient refused       Family History   Problem Relation Age of Onset   • Coronary artery disease Mother    • Stroke Mother    • No Known Problems Father    • Cancer Maternal Grandmother    • Heart attack Maternal Grandfather    • Heart disease Maternal Grandfather    • No Known Problems Paternal Grandmother    • No Known Problems Paternal Grandfather            ECG 12 Lead    Date/Time: 2/23/2021 9:45 AM  Performed by: Natalio Morales MD  Authorized by: Natalio Morales MD   Comparison: compared with previous ECG from 2/13/2020  Similar to previous ECG  Rhythm: sinus rhythm  Rate: normal  Conduction: conduction normal  ST Segments: ST segments normal  T Waves: T waves normal  QRS axis: normal  Other: no other findings    Clinical impression: normal ECG               Objective:     /90   Pulse 75   Ht 175.3 cm (69\")   Wt 77.1 kg (170 lb)   SpO2 98%   BMI 25.10 kg/m²  Body mass index is 25.1 kg/m².     Constitutional:       General: Not in acute distress.     Appearance: " Well-developed. Not diaphoretic.   Eyes:      Pupils: Pupils are equal, round, and reactive to light.   HENT:      Head: Normocephalic and atraumatic.   Neck:      Musculoskeletal: Normal range of motion and neck supple.      Thyroid: No thyromegaly.   Pulmonary:      Effort: Pulmonary effort is normal. No respiratory distress.      Breath sounds: Normal breath sounds. No wheezing. No rales.   Chest:      Chest wall: Not tender to palpatation.   Cardiovascular:      Normal rate. Regular rhythm.      No gallop.   Pulses:     Intact distal pulses.   Edema:     Peripheral edema absent.   Abdominal:      General: Bowel sounds are normal. There is no distension.      Palpations: Abdomen is soft.      Tenderness: There is no guarding.   Musculoskeletal: Normal range of motion.         General: No deformity.   Skin:     General: Skin is warm and dry.      Findings: No rash.   Neurological:      Mental Status: Alert and oriented to person, place, and time.      Cranial Nerves: No cranial nerve deficit.      Deep Tendon Reflexes: Reflexes are normal and symmetric.   Psychiatric:         Judgment: Judgment normal.         Review Of Data:       Assessment/Plan:         1.  Coronary artery disease with anterior MI status post GLORIA in the LAD back in 2010.  Also noted to have 30% circumflex and 6% RCA lesion at that time  -Stable without any symptoms  -Continue aspirin and high intensity statin.  2.  Hypercholesterolemia on Crestor and Zetia.  Lipid panel at goal.  I will go ahead and discontinue Zetia and check his lipid panel in a month.  3.  Hypertension-controlled  4.  Mild bilateral carotid artery disease-continue high intensity statin and aspirin  5.  Chronic bilateral knee pain from degenerative disease-encourage patient to avoid ibuprofen but use Tylenol instead and see orthopedic surgeon.  He was advised to have surgery about 5 years ago that he deferred      Return to clinic in 1 year  Lipid panel in 1  month      Diagnosis and plan of care discussed with patient and verbalized understanding.           Natalio Morales MD  02/23/21  10:09 EST

## 2021-03-09 DIAGNOSIS — Z23 IMMUNIZATION DUE: ICD-10-CM

## 2021-03-17 ENCOUNTER — LAB (OUTPATIENT)
Dept: LAB | Facility: HOSPITAL | Age: 81
End: 2021-03-17

## 2021-03-17 ENCOUNTER — TELEPHONE (OUTPATIENT)
Dept: CARDIOLOGY | Facility: CLINIC | Age: 81
End: 2021-03-17

## 2021-03-17 DIAGNOSIS — E78.00 HYPERCHOLESTEROLEMIA: ICD-10-CM

## 2021-03-17 PROCEDURE — 80061 LIPID PANEL: CPT | Performed by: INTERNAL MEDICINE

## 2021-03-17 PROCEDURE — 83704 LIPOPROTEIN BLD QUAN PART: CPT | Performed by: INTERNAL MEDICINE

## 2021-03-17 PROCEDURE — 82550 ASSAY OF CK (CPK): CPT | Performed by: INTERNAL MEDICINE

## 2021-03-17 PROCEDURE — 81001 URINALYSIS AUTO W/SCOPE: CPT | Performed by: INTERNAL MEDICINE

## 2021-03-17 PROCEDURE — 84153 ASSAY OF PSA TOTAL: CPT | Performed by: INTERNAL MEDICINE

## 2021-03-17 PROCEDURE — 85027 COMPLETE CBC AUTOMATED: CPT | Performed by: INTERNAL MEDICINE

## 2021-03-17 PROCEDURE — 84443 ASSAY THYROID STIM HORMONE: CPT | Performed by: INTERNAL MEDICINE

## 2021-03-17 PROCEDURE — 80053 COMPREHEN METABOLIC PANEL: CPT | Performed by: INTERNAL MEDICINE

## 2021-03-17 PROCEDURE — 82306 VITAMIN D 25 HYDROXY: CPT | Performed by: INTERNAL MEDICINE

## 2021-03-17 PROCEDURE — 84439 ASSAY OF FREE THYROXINE: CPT | Performed by: INTERNAL MEDICINE

## 2021-03-17 PROCEDURE — 84481 FREE ASSAY (FT-3): CPT | Performed by: INTERNAL MEDICINE

## 2021-03-17 NOTE — TELEPHONE ENCOUNTER
Patient calling stating at last office visit he was to d/c his ezetimibe 10 mg for 30 days. He is due to have a blood draw on Friday 3/19/2021. States he saw PCP today, and had a lipid panel drawn. Patient is wanting to know if he still needs to have blood work drawn on Friday? Please advise.

## 2021-03-24 ENCOUNTER — OFFICE VISIT (OUTPATIENT)
Dept: INTERNAL MEDICINE | Facility: CLINIC | Age: 81
End: 2021-03-24

## 2021-03-24 VITALS
DIASTOLIC BLOOD PRESSURE: 54 MMHG | HEIGHT: 69 IN | BODY MASS INDEX: 24.82 KG/M2 | OXYGEN SATURATION: 99 % | SYSTOLIC BLOOD PRESSURE: 120 MMHG | HEART RATE: 60 BPM | WEIGHT: 167.6 LBS

## 2021-03-24 DIAGNOSIS — R97.20 ELEVATED PSA: Chronic | ICD-10-CM

## 2021-03-24 DIAGNOSIS — Z98.61 HISTORY OF PTCA: Chronic | ICD-10-CM

## 2021-03-24 DIAGNOSIS — M15.9 GENERALIZED OSTEOARTHRITIS OF MULTIPLE SITES: Chronic | ICD-10-CM

## 2021-03-24 DIAGNOSIS — Z51.81 THERAPEUTIC DRUG MONITORING: ICD-10-CM

## 2021-03-24 DIAGNOSIS — Z00.00 ENCOUNTER FOR SUBSEQUENT ANNUAL WELLNESS VISIT (AWV) IN MEDICARE PATIENT: Primary | ICD-10-CM

## 2021-03-24 DIAGNOSIS — I65.23 ATHEROSCLEROSIS OF BOTH CAROTID ARTERIES: Chronic | ICD-10-CM

## 2021-03-24 DIAGNOSIS — N40.1 BENIGN NON-NODULAR PROSTATIC HYPERPLASIA WITH LOWER URINARY TRACT SYMPTOMS: Chronic | ICD-10-CM

## 2021-03-24 DIAGNOSIS — I65.23 ATHEROSCLEROSIS OF BOTH CAROTID ARTERIES: ICD-10-CM

## 2021-03-24 DIAGNOSIS — I25.10 CORONARY ARTERY DISEASE INVOLVING NATIVE CORONARY ARTERY OF NATIVE HEART WITHOUT ANGINA PECTORIS: Chronic | ICD-10-CM

## 2021-03-24 DIAGNOSIS — E55.9 VITAMIN D DEFICIENCY: Chronic | ICD-10-CM

## 2021-03-24 DIAGNOSIS — I10 BENIGN ESSENTIAL HYPERTENSION: Chronic | ICD-10-CM

## 2021-03-24 DIAGNOSIS — I25.2 HISTORY OF MYOCARDIAL INFARCTION: Chronic | ICD-10-CM

## 2021-03-24 DIAGNOSIS — D69.6 THROMBOCYTOPENIA (HCC): ICD-10-CM

## 2021-03-24 DIAGNOSIS — R41.3 MEMORY LOSS: ICD-10-CM

## 2021-03-24 DIAGNOSIS — K21.9 GASTROESOPHAGEAL REFLUX DISEASE WITHOUT ESOPHAGITIS: Chronic | ICD-10-CM

## 2021-03-24 DIAGNOSIS — E78.2 MIXED HYPERLIPIDEMIA: Chronic | ICD-10-CM

## 2021-03-24 DIAGNOSIS — R16.1 SPLENOMEGALY: Chronic | ICD-10-CM

## 2021-03-24 PROCEDURE — 96160 PT-FOCUSED HLTH RISK ASSMT: CPT | Performed by: INTERNAL MEDICINE

## 2021-03-24 PROCEDURE — 1159F MED LIST DOCD IN RCRD: CPT | Performed by: INTERNAL MEDICINE

## 2021-03-24 PROCEDURE — 99214 OFFICE O/P EST MOD 30 MIN: CPT | Performed by: INTERNAL MEDICINE

## 2021-03-24 PROCEDURE — G0439 PPPS, SUBSEQ VISIT: HCPCS | Performed by: INTERNAL MEDICINE

## 2021-03-24 PROCEDURE — 1170F FXNL STATUS ASSESSED: CPT | Performed by: INTERNAL MEDICINE

## 2021-03-24 RX ORDER — ROSUVASTATIN CALCIUM 20 MG/1
TABLET, COATED ORAL
Qty: 90 TABLET | Refills: 3 | Status: SHIPPED | OUTPATIENT
Start: 2021-03-24 | End: 2022-03-25 | Stop reason: SDUPTHER

## 2021-03-24 RX ORDER — METOPROLOL SUCCINATE 50 MG/1
TABLET, EXTENDED RELEASE ORAL
Qty: 90 TABLET | Refills: 3 | Status: SHIPPED | OUTPATIENT
Start: 2021-03-24 | End: 2022-03-25 | Stop reason: SDUPTHER

## 2021-03-24 NOTE — PROGRESS NOTES
The ABCs of the Annual Wellness Visit  Subsequent Medicare Wellness Visit    No chief complaint on file.      Subjective   History of Present Illness:  Sergey Mendoza is a 80 y.o. male who presents for a Subsequent Medicare Wellness Visit.    HEALTH RISK ASSESSMENT    Recent Hospitalizations:  No hospitalization(s) within the last year.    Current Medical Providers:  Patient Care Team:  Ramesh Blake MD as PCP - General (Internal Medicine)    Smoking Status:  Social History     Tobacco Use   Smoking Status Former Smoker   Smokeless Tobacco Never Used   Tobacco Comment    Stopped smoking in his early 20s.       Alcohol Consumption:  Social History     Substance and Sexual Activity   Alcohol Use Yes   • Alcohol/week: 1.0 standard drinks   • Types: 1 Glasses of wine per week    Comment: Socially       Depression Screen:   PHQ-2/PHQ-9 Depression Screening 3/24/2021   Little interest or pleasure in doing things 0   Feeling down, depressed, or hopeless 0   Trouble falling or staying asleep, or sleeping too much -   Feeling tired or having little energy -   Poor appetite or overeating -   Feeling bad about yourself - or that you are a failure or have let yourself or your family down -   Trouble concentrating on things, such as reading the newspaper or watching television -   Moving or speaking so slowly that other people could have noticed. Or the opposite - being so fidgety or restless that you have been moving around a lot more than usual -   Thoughts that you would be better off dead, or of hurting yourself in some way -   Total Score 0       Fall Risk Screen:  TARAHADI Fall Risk Assessment was completed, and patient is at LOW risk for falls.Assessment completed on:3/24/2021    Health Habits and Functional and Cognitive Screening:  Functional & Cognitive Status 3/24/2021   Do you have difficulty preparing food and eating? No   Do you have difficulty bathing yourself, getting dressed or grooming yourself? No   Do you  have difficulty using the toilet? No   Do you have difficulty moving around from place to place? No   Do you have trouble with steps or getting out of a bed or a chair? No   Current Diet Well Balanced Diet   Dental Exam Up to date   Eye Exam Up to date   Exercise (times per week) 3 times per week   Current Exercise Activities Include Walking   Do you need help using the phone?  No   Are you deaf or do you have serious difficulty hearing?  No   Do you need help with transportation? No   Do you need help shopping? No   Do you need help preparing meals?  No   Do you need help with housework?  No   Do you need help with laundry? No   Do you need help taking your medications? No   Do you need help managing money? No   Do you ever drive or ride in a car without wearing a seat belt? No   Have you felt unusual stress, anger or loneliness in the last month? No   Who do you live with? Spouse   If you need help, do you have trouble finding someone available to you? No   Have you been bothered in the last four weeks by sexual problems? No   Do you have difficulty concentrating, remembering or making decisions? Yes         Does the patient have evidence of cognitive impairment? Yes    Asprin use counseling:Taking ASA appropriately as indicated    Age-appropriate Screening Schedule:  Refer to the list below for future screening recommendations based on patient's age, sex and/or medical conditions. Orders for these recommended tests are listed in the plan section. The patient has been provided with a written plan.    Health Maintenance   Topic Date Due   • COLONOSCOPY  01/09/2022   • LIPID PANEL  03/17/2022   • TDAP/TD VACCINES (2 - Td) 02/13/2027   • INFLUENZA VACCINE  Discontinued   • ZOSTER VACCINE  Discontinued          The following portions of the patient's history were reviewed and updated as appropriate: allergies, current medications, past family history, past medical history, past social history, past surgical history  and problem list.    Outpatient Medications Prior to Visit   Medication Sig Dispense Refill   • aspirin 81 MG tablet Take 81 mg by mouth Daily.     • Cholecalciferol (VITAMIN D3) 125 MCG (5000 UT) capsule capsule 1 by mouth daily as directed 30 capsule    • Coenzyme Q10 (COQ10) 400 MG capsule Take 1 capsule by mouth daily. Take with a meal.     • metoprolol succinate XL (TOPROL-XL) 50 MG 24 hr tablet TAKE 1 TABLET EVERY DAY 90 tablet 0   • rosuvastatin (CRESTOR) 20 MG tablet Take 1 tablet by mouth Daily. 90 tablet 3   • triamcinolone (KENALOG) 0.1 % cream Apply  topically to the appropriate area as directed 3 (Three) Times a Day. As needed rash/itching 60 g 0     No facility-administered medications prior to visit.       Patient Active Problem List   Diagnosis   • Benign essential hypertension   • Benign prostatic hypertrophy, 07/07/2008--negative biopsy.   • Carotid artery plaque, 09/10/2018--mild bilateral. 08/11/2016--mild bilateral carotid artery plaque.   • Coronary artery disease involving native coronary artery of native heart without angina pectoris   • Diverticulosis of colon   • Gastroesophageal reflux disease without esophagitis   • Generalized osteoarthritis of multiple sites   • Hyperlipidemia   • History of myocardial infarction, 10/08/2010--patient presented with an anterior myocardial infarction.   • Primary osteoarthritis of right knee   • History of PTCA, 10/08/2010--anterior MI.  EF 40%.  PTCA with drug-eluting stent proximal LAD.  10% LM.  100% diagonal 1.  30% diffuse circumflex.  30% diffuse RCA.  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       Advanced Care Planning:  ACP discussion was held with the patient during this visit. Patient has an advance  "directive in EMR which is still valid.     Review of Systems   Constitutional: Negative.    HENT: Negative.    Eyes: Negative.    Respiratory: Negative.    Cardiovascular: Negative.    Gastrointestinal: Negative.    Endocrine: Negative.    Genitourinary: Negative.    Musculoskeletal: Positive for arthralgias.   Skin: Negative.    Allergic/Immunologic: Negative.    Neurological: Negative.    Hematological: Negative.  Does not bruise/bleed easily.   Psychiatric/Behavioral: Negative.        Compared to one year ago, the patient feels his physical health is the same.  Compared to one year ago, the patient feels his mental health is the same.    Reviewed chart for potential of high risk medication in the elderly: yes  Reviewed chart for potential of harmful drug interactions in the elderly:yes    Objective         Vitals:    03/24/21 0736   BP: 120/54   BP Location: Right arm   Patient Position: Sitting   Cuff Size: Adult   Pulse: 60   SpO2: 99%   Weight: 76 kg (167 lb 9.6 oz)   Height: 175.3 cm (69\")   PainSc: 0-No pain       Body mass index is 24.75 kg/m².  Discussed the patient's BMI with him. The BMI is in the acceptable range.    Physical Exam    General: Alert and oriented x 3.  No acute distress.  Normal affect.  HEENT: Pupils equal, round, reactive to light; extraocular movements intact; sclerae nonicteric; pharynx, ear canals and TMs normal.  Neck: Without JVD, thyromegaly, bruit, or adenopathy.  Lungs: Clear to auscultation in all fields.  Heart: Regular rate and rhythm without murmur, rub, gallop, or click.  Abdomen: Soft, nontender, without hepatosplenomegaly or hernia.  Bowel sounds normal.  : Deferred.  Rectal: Deferred.  Extremities: Without clubbing, cyanosis, edema, or pulse deficit.  Neurologic: Intact without focal deficit.  Normal station and gait observed during ingress and egress from the examination room.  Skin: Without significant lesion.  Musculoskeletal: Unremarkable.    Lab Results "   Component Value Date    CHLPL 103 03/17/2021    TRIG 118 03/17/2021        Assessment/Plan   Medicare Risks and Personalized Health Plan  CMS Preventative Services Quick Reference  Advance Directive Discussion  Cardiovascular risk  Dementia/Memory   Diabetic Lab Screening   Prostate Cancer Screening     The above risks/problems have been discussed with the patient.  Pertinent information has been shared with the patient in the After Visit Summary.  Follow up plans and orders are seen below in the Assessment/Plan Section.    Diagnoses and all orders for this visit:    1. Encounter for subsequent annual wellness visit (AWV) in Medicare patient (Primary)    2. Hyperlipidemia  -     CK; Future  -     Comprehensive Metabolic Panel; Future  -     NMR LipoProfile; Future  -     TSH; Future  -     T4, Free; Future  -     T3, Free; Future    3. Benign essential hypertension    4. Coronary artery disease involving native coronary artery of native heart without angina pectoris    5. History of myocardial infarction, 10/08/2010--patient presented with an anterior myocardial infarction.    6. History of PTCA, 10/08/2010--anterior MI.  EF 40%.  PTCA with drug-eluting stent proximal LAD.  10% LM.  100% diagonal 1.  30% diffuse circumflex.  30% diffuse RCA.  2011--EF 64%    7. Benign prostatic hypertrophy, 07/07/2008--negative biopsy.  -     PSA DIAGNOSTIC; Future    8. Elevated PSA    9. Carotid artery plaque, 09/10/2018--mild bilateral. 08/11/2016--mild bilateral carotid artery plaque.  -     Duplex Carotid Ultrasound CAR    10. Gastroesophageal reflux disease without esophagitis    11. Generalized osteoarthritis of multiple sites    12. Vitamin D deficiency  -     Vitamin D 25 Hydroxy; Future    13. Splenomegaly  -     CBC (No Diff); Future    14. Memory loss    15. Thrombocytopenia (CMS/HCC)  -     CBC (No Diff); Future    16. Therapeutic drug monitoring  -     Urinalysis With Microscopic If Indicated (No Culture) - Urine,  Clean Catch; Future      Follow Up:  No follow-ups on file.     An After Visit Summary and PPPS were given to the patient.

## 2021-03-24 NOTE — PROGRESS NOTES
03/24/2021    Patient Information  Sergey Mendoza                                                                                          [address]  [facility] [address]      1940  [unfilled]  There is no work phone number on file.    Chief Complaint:     Subsequent Medicare wellness visit.  Follow-up lab work in order to monitor chronic medical issues listed in history of present illness.  No new acute complaints.    History of Present Illness:    Patient with a history of chronic medical problems including hyperlipidemia, hypertension, coronary artery disease, history of myocardial infarction and history of PTCA, BPH with elevated PSA, carotid artery plaque, esophageal reflux, generalized osteoarthritis, vitamin D deficiency, splenomegaly, complaints of memory loss.  He presents today for his subsequent Medicare wellness visit.  He also had lab work in order to monitor his chronic medical issues.  His past medical history reviewed and updated were necessary including health maintenance parameters.  This reveals he will be up-to-date or else accounted for after today's visit with the exception of needing a carotid Doppler study.    Review of Systems   Constitutional: Negative.   HENT: Negative.    Eyes: Negative.    Cardiovascular: Negative.    Respiratory: Negative.    Endocrine: Negative.    Hematologic/Lymphatic: Negative.    Skin: Negative.    Musculoskeletal: Negative.    Gastrointestinal: Negative.    Genitourinary: Negative.    Neurological: Negative.    Psychiatric/Behavioral: Negative.    Allergic/Immunologic: Negative.        Active Problems:    Patient Active Problem List   Diagnosis   • Benign essential hypertension   • Benign prostatic hypertrophy, 07/07/2008--negative biopsy.   • Carotid artery plaque, 09/10/2018--mild bilateral. 08/11/2016--mild bilateral carotid artery plaque.   • Coronary artery disease involving native coronary artery of native heart without angina  pectoris   • Diverticulosis of colon   • Gastroesophageal reflux disease without esophagitis   • Generalized osteoarthritis of multiple sites   • Hyperlipidemia   • History of myocardial infarction, 10/08/2010--patient presented with an anterior myocardial infarction.   • Primary osteoarthritis of right knee   • History of PTCA, 10/08/2010--anterior MI.  EF 40%.  PTCA with drug-eluting stent proximal LAD.  10% LM.  100% diagonal 1.  30% diffuse circumflex.  30% diffuse RCA.  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         Past Medical History:   Diagnosis Date   • Asymptomatic cholelithiasis 2/19/2018   • Benign essential hypertension 3/24/2006    Diagnosed approximately March 2006.   • Benign prostatic hypertrophy, 07/07/2008--negative biopsy. 7/7/2008    Patient had a history of elevated PSA and found to have benign prostatic hypertrophy.   07/07/2008--negative prostate biopsy.   • Carotid artery plaque, 09/10/2018--mild bilateral. 08/11/2016--mild bilateral carotid artery plaque. 4/21/2009    September 10, 2018--carotid Doppler study reveals mild bilateral carotid artery plaque.  08/11/2016--carotid Doppler study reveals mild bilateral carotid plaque.  05/28/2014--vascular screen reveals mild bilateral carotid plaque, negative for AAA, negative for PAD.   03/30/2011--vascular screen revealed mild bilateral carotid plaque, negative for abdominal aneurysm, negative for PAD.   04/21/2009-   • Diverticulosis of colon 1/9/2012 01/09/2012--normal colonoscopy except for diffuse diverticulosis.   2004--colonoscopy normal.   • Elevated PSA 11/6/2013 02/13/2017--patient seen in follow-up and reports he is followed on a yearly basis by the urologist.  Last follow-up was  in December 2016 and his PSA was less than 5.  07/28/2016--PSA elevated at 4.88.  03/19/2014--he was seen in followup and his PSA was back down to 3.9. Patient was not having any urinary symptoms.  12/11/2013--patient was evaluated by the urologist and he elected to just observe the PSA.   11/06/2013--patient seen in followup in his PSA returned even higher at 5.7. He was referred back to urology.   11/06/2013--PSA returned elevated at 4.3. Patient did have some urinary symptoms consisting of periods of weak stream and dribbling. He had never been treated for chronic prostatitis. I treated him with ciprofloxacin 500 mg for 30 days.       • Gastroesophageal reflux disease without esophagitis 3/24/2016   • Generalized osteoarthritis of multiple sites 3/24/2016   • History of cardiac catheterization 10/8/2010    10/08/2010--patient presented with an anterior myocardial infarction. Heart catheterization revealed anterior/apical hypokinesis with 40% ejection fraction. Severe 80% ostial LAD stenosis. Mild 10% left main. Mild diffuse LAD. Totally occluded diagonal one. 30% diffuse circumflex. Mild 30% percent diffuse RCA. Angioplasty with drug eluding stent performed to the proximal LAD.    • History of Chronic Duodenitis 1/9/2012 01/09/2012--EGD revealed mild distal erythema of the stomach and proximal duodenum.   • History of closed Colles' fracture of left radius 05/05/1953    13 years of age.   • History of iron deficiency anemia 6/23/2012 02/07/2017--hemoglobin normal at 14.2, hematocrit normal at 45.1.  Serum iron normal at 105.  Iron saturation normal at 30%.  Resolve this issue.  08/02/2016--patient seen in follow-up.  White count is low at 3.66.  Hemoglobin low at 13.3.  Hematocrit normal at 41.8.  Platelets low at 109.  Homocystine and methylmalonic acid are normal.  Serum iron normal at 98.  Iron saturation normal at 28.  Patient is taking iron sulfate 325 mg daily.  10/13/2013--CBC was entirely normal,  anemia resolved we'll continue to monitor.  06/23/2012--patient treated for iron deficiency anemia caused by duodenitis which was revealed per EGD 01/09/2012.    • History of myocardial infarction, 10/08/2010--patient presented with an anterior myocardial infarction. 10/8/2010    Stress test 02/23/2011--mild anterior wall ischemia, ejection fraction improved to 64%.  10/08/2010--patient presented with an anterior myocardial infarction. Heart catheterization revealed anterior/apical hypokinesis with 40% ejection fraction. Severe 80% ostial LAD stenosis. Mild 10% left main. Mild diffuse LAD. Totally occluded diagonal one. 30% diffuse circumflex. Mild 30% percent diffuse RCA. Angioplasty with drug eluding stent performed to the proximal LAD.    • History of PTCA, 10/08/2010--anterior MI.  EF 40%.  PTCA with drug-eluting stent proximal LAD.  10% LM.  100% diagonal 1.  30% diffuse circumflex.  30% diffuse RCA.  2011--EF 64% 10/8/2010    Stress test 02/23/2011--mild anterior wall ischemia, ejection fraction improved to 64%.  10/08/2010--patient presented with an anterior myocardial infarction. Heart catheterization revealed anterior/apical hypokinesis with 40% ejection fraction. Severe 80% ostial LAD stenosis. Mild 10% left main. Mild diffuse LAD. Totally occluded diagonal one. 30% diffuse circumflex. Mild 30% percent diffuse RCA. Angioplasty with drug eluding stent performed to the proximal LAD.    • History of Tear of medial meniscus of right knee 10/17/2014    12/09/2014--patient was evaluated by the orthopedist. He felt that patient also had end stage osteoarthritis the right knee and will eventually need a total knee replacement. In the meantime he gave a cortisone injection in the knee and patient reports this did help. Patient also went to physical therapy which seemed to help somewhat also.   10/29/2014--MRI of the right knee reveals tricompartmental osteoarthritis that is severe in the medial compartment. There  is a degenerative tear of the medial meniscus. There is a joint effusion with Baker's cyst. Intramuscular extension of the Baker's cyst into the medial head of the gastrocnemius muscle. Patient referred to orthopedics.   10/17/2014--x-ray of the right knee reveals a small suprapatellar effusion. Narrowing of the medial compartment. There is patellar spur formation. No fracture, dislocation, bone lesion is demonstrated.   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 He was trying to sit down and was in an awkward position.    • Hyperlipidemia 3/6/2011    03/16/2011--treatment for hyperlipidemia and initiated.   • Internal hemorrhoids 3/24/2016   • Microscopic hematuria 2/19/2018 12/20/2017--patient was evaluated by the urologist for a routine follow-up elevated PSA.  He was noted to have microscopic hematuria with 3-5 RBCs.  Cystoscopy was reportedly negative.  Patient also had a CT scan of the abdomen and pelvis which revealed asymptomatic cholelithiasis, diffuse atherosclerosis, and enlarged prostate.   • Multiple actinic keratoses 8/2/2016 08/02/2016--patient presents with multiple actinic keratoses as well as seborrheic keratoses.  Dermatology referral given.   • Occlusive coronary artery disease, 10/08/2010--anterior MI.  EF 40%.  PTCA with drug-eluting stent proximal LAD.  10% LM.  100% diagonal 1.  30% diffuse circumflex.  30% diffuse RCA. 10/8/2010    02/23/2011--stress Cardiolite reveals mild anterior wall ischemia, ejection fraction improved to 64%.  10/08/2010--patient presented with an anterior myocardial infarction. Heart catheterization revealed anterior/apical hypokinesis with 40% ejection fraction. Severe 80% ostial LAD stenosis. Mild 10% left main. Mild diffuse LAD. Totally occluded diagonal one. 30% diffuse circumflex. Mild 30% percent diffuse RCA. Angioplasty with drug eluding stent performed to the proximal LAD.   10/08/2010--anterior MI.  EF 40%.  PTCA with  drug-eluting stent proximal LAD.  10% LM.  100% diagonal 1.  30% diffuse circumflex.  30% diffuse RCA.   • Primary osteoarthritis of right knee 10/17/2014    12/09/2014--patient was evaluated by the orthopedist. He felt that patient also had end stage osteoarthritis the right knee and will eventually need a total knee replacement. In the meantime he gave a cortisone injection in the knee and patient reports this did help. Patient also went to physical therapy which seemed to help somewhat also.  10/29/2014--MRI of the right knee reveals tricompartmental osteoarthritis that is severe in the medial compartment. There is a degenerative tear of the medial meniscus. There is a joint effusion with Baker's cyst. Intramuscular extension of the Baker's cyst into the medial head of the gastrocnemius muscle. Patient referred to orthopedics.   10/17/2014--x-ray of the right knee reveals a small suprapatellar effusion. Narrowing of the medial compartment. There is patellar spur formation. No fracture, dislocation, bone lesion is demonstrated.   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 He was trying to sit down and was in an awkward position. T   • Splenomegaly 8/2/2016 09/06/2013--CT scan reveals mild splenomegaly.  Although not in the report, the radiologist did compare the CT scan from 09/06/2013 to the one from 2010.  The spleen actually looks smaller in size, 13.5 cm compared to 15 cm previously.  In another direction, spleen is 20 cm as compared to 24 cm.  The last dimension was the maximum length.  08/04/2010--initial evaluation by the hematologist. WBCs 3.5. Differential normal. Absolute neutrophil count 2500. Hemoglobin normal at 14.2. MCV low at 83. Platelet count low at 125. IPF normal at 4.6%. Review of old labs through 1997 revealed a white blood cell count has fluctuated between 2.8 and 3.9. There is no trend downward but it is fluctuating. Platelet count has ranged between 148 and  198. MCV has been low. Liver spleen scan revealed mild splenomegaly. B12, folic acid, iron studies, rheumatoid factor, SPEP have all been normal. Felt to be mild pancytopenia secondary to splenomegaly. Patient is followed on a regular basis by the hematologist.   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 3/24/2021   • Vitamin D deficiency 3/24/2016         Past Surgical History:   Procedure Laterality Date   • CARDIAC CATHETERIZATION  10/08/2010    See past medical history   • COLONOSCOPY  01/09/2012 01/09/2012--normal colonoscopy except for diffuse diverticulosis.    • COLONOSCOPY  2004 2004--colonoscopy normal.   • CORONARY ANGIOPLASTY WITH STENT PLACEMENT  10/10/2010    10/10/2010--critical 80% proximal LAD stenosis treated using a drug-coated stent. Unable to cross diagonal one.   • ESOPHAGOSCOPY / EGD  01/09/2012 01/09/2012--EGD performed for iron deficiency anemia revealed mild erythema of the distal stomach and proximal duodenum consistent with duodenitis.   • PROSTATE BIOPSY  07/07/2008 07/07/2008--negative prostate biopsy.  Patient had a history of elevated PSA and found to have benign prostatic hypertrophy.          Allergies   Allergen Reactions   • Penicillins    • Sulfa Antibiotics            Current Outpatient Medications:   •  aspirin 81 MG tablet, Take 81 mg by mouth Daily., Disp: , Rfl:   •  Cholecalciferol (VITAMIN D3) 125 MCG (5000 UT) capsule capsule, 1 by mouth daily as directed, Disp: 30 capsule, Rfl:   •  Coenzyme Q10 (COQ10) 400 MG capsule, Take 1 capsule by mouth daily. Take with a meal., Disp: , Rfl:   •  metoprolol succinate XL (TOPROL-XL) 50 MG 24 hr tablet, Take 1 p.o. daily for blood pressure and heart, Disp: 90 tablet, Rfl: 3  •  rosuvastatin (CRESTOR) 20 MG tablet, Take 1 p.o. daily for high cholesterol, Disp: 90 tablet, Rfl: 3  •  triamcinolone (KENALOG) 0.1 % cream, Apply  topically to the appropriate area as directed 3 (Three) Times a Day. As needed  "rash/itching, Disp: 60 g, Rfl: 0      Family History   Problem Relation Age of Onset   • Coronary artery disease Mother    • Stroke Mother    • No Known Problems Father    • Cancer Maternal Grandmother    • Heart attack Maternal Grandfather    • Heart disease Maternal Grandfather    • No Known Problems Paternal Grandmother    • No Known Problems Paternal Grandfather          Social History     Socioeconomic History   • Marital status:      Spouse name: Not on file   • Number of children: Not on file   • Years of education: Not on file   • Highest education level: Not on file   Tobacco Use   • Smoking status: Former Smoker   • Smokeless tobacco: Never Used   • Tobacco comment: Stopped smoking in his early 20s.   Vaping Use   • Vaping Use: Never used   Substance and Sexual Activity   • Alcohol use: Yes     Alcohol/week: 1.0 standard drinks     Types: 1 Glasses of wine per week     Comment: Socially   • Drug use: No   • Sexual activity: Yes     Partners: Female         Vitals:    03/24/21 0736   BP: 120/54   BP Location: Right arm   Patient Position: Sitting   Cuff Size: Adult   Pulse: 60   SpO2: 99%   Weight: 76 kg (167 lb 9.6 oz)   Height: 175.3 cm (69\")        Body mass index is 24.75 kg/m².      Physical Exam:    General: Alert and oriented x 3.  No acute distress.  Normal affect.  HEENT: Pupils equal, round, reactive to light; extraocular movements intact; sclerae nonicteric; pharynx, ear canals and TMs normal.  Neck: Without JVD, thyromegaly, bruit, or adenopathy.  Lungs: Clear to auscultation in all fields.  Heart: Regular rate and rhythm without murmur, rub, gallop, or click.  Abdomen: Soft, nontender, without hepatosplenomegaly or hernia.  Bowel sounds normal.  : Deferred.  Rectal: Deferred.  Extremities: Without clubbing, cyanosis, edema, or pulse deficit.  Neurologic: Intact without focal deficit.  Normal station and gait observed during ingress and egress from the examination room.  Skin: Without " significant lesion.  Musculoskeletal: Unremarkable.    Lab/other results:    NMR reveals a total cholesterol of 103.  Triglycerides are 19.  LDL particle number excellent at 537.  Small LDL particle number excellent at 414.  HDL particle number normal at 32.3.  CMP is normal.  Vitamin D normal.  TSH slightly elevated at 4.3.  Free T3 and free T4 are normal.  PSA elevated at 5.4.  Urinalysis reveals 6-12 RBCs, 0-2 WBCs, 0 bacteria.  CBC is normal except platelets are low at 96.  CPK normal.    Assessment/Plan:     Diagnosis Plan   1. Encounter for subsequent annual wellness visit (AWV) in Medicare patient     2. Hyperlipidemia  CK    Comprehensive Metabolic Panel    NMR LipoProfile    TSH    T4, Free    T3, Free    rosuvastatin (CRESTOR) 20 MG tablet   3. Benign essential hypertension  metoprolol succinate XL (TOPROL-XL) 50 MG 24 hr tablet   4. Coronary artery disease involving native coronary artery of native heart without angina pectoris  metoprolol succinate XL (TOPROL-XL) 50 MG 24 hr tablet   5. History of myocardial infarction, 10/08/2010--patient presented with an anterior myocardial infarction.     6. History of PTCA, 10/08/2010--anterior MI.  EF 40%.  PTCA with drug-eluting stent proximal LAD.  10% LM.  100% diagonal 1.  30% diffuse circumflex.  30% diffuse RCA.  2011--EF 64%     7. Benign prostatic hypertrophy, 07/07/2008--negative biopsy.  PSA DIAGNOSTIC   8. Elevated PSA     9. Carotid artery plaque, 09/10/2018--mild bilateral. 08/11/2016--mild bilateral carotid artery plaque.  Duplex Carotid Ultrasound CAR   10. Gastroesophageal reflux disease without esophagitis     11. Generalized osteoarthritis of multiple sites     12. Vitamin D deficiency  Vitamin D 25 Hydroxy   13. Splenomegaly  CBC (No Diff)   14. Memory loss     15. Thrombocytopenia (CMS/HCC)  CBC (No Diff)   16. Therapeutic drug monitoring  Urinalysis With Microscopic If Indicated (No Culture) - Urine, Clean Catch     The subsequent Medicare  wellness visit is documented on separate note.    Patient has hyperlipidemia which is under excellent control which is important given his coronary artery disease and carotid artery plaque.  Coronary artery disease is stable.  Patient blood pressures well controlled with metoprolol.  Patient has BPH and elevated PSA and also has microscopic hematuria.  He is followed by the urologist and I encouraged him to follow-up with them.  He is overdue for a carotid Doppler study which we will order.  Esophageal reflux is not currently an issue and patient is not on PPI therapy.  Generalized osteoarthritis with nonsevere and not an issue today.  Vitamin D in normal range.  Patient has splenomegaly which has been evaluated and also has thrombocytopenia related to this.  I will check and see if he has been evaluated by hematology.  In regards to his memory loss, patient does not think it is severe and does not want to proceed with work-up at this time.    Plan is as follows: No change in current medical regimen.  Carotid Doppler study ordered.  Patient will follow-up in 1 year with lab prior and this will also be subsequent Medicare wellness visit.  Otherwise he will follow-up as needed.      Addendum: Patient discontinued Zetia about a month ago at the advice of the cardiologist.  I compared his NMR profile and they looked about the same and I think it will be fine to stay off of his Zetia.    Procedures

## 2021-04-16 ENCOUNTER — APPOINTMENT (OUTPATIENT)
Dept: CARDIOLOGY | Facility: HOSPITAL | Age: 81
End: 2021-04-16

## 2021-04-26 ENCOUNTER — HOSPITAL ENCOUNTER (OUTPATIENT)
Dept: CARDIOLOGY | Facility: HOSPITAL | Age: 81
Discharge: HOME | End: 2021-04-26
Admitting: INTERNAL MEDICINE

## 2021-04-26 LAB
BH CV XLRA MEAS LEFT DIST CCA EDV: -13 CM/SEC
BH CV XLRA MEAS LEFT DIST CCA PSV: -100.6 CM/SEC
BH CV XLRA MEAS LEFT DIST ICA EDV: -14 CM/SEC
BH CV XLRA MEAS LEFT DIST ICA PSV: -52.2 CM/SEC
BH CV XLRA MEAS LEFT ICA/CCA RATIO: 0.72
BH CV XLRA MEAS LEFT MID ICA EDV: -16.2 CM/SEC
BH CV XLRA MEAS LEFT MID ICA PSV: -72 CM/SEC
BH CV XLRA MEAS LEFT PROX CCA EDV: 17.4 CM/SEC
BH CV XLRA MEAS LEFT PROX CCA PSV: 109.3 CM/SEC
BH CV XLRA MEAS LEFT PROX ECA PSV: -72 CM/SEC
BH CV XLRA MEAS LEFT PROX ICA EDV: -16.2 CM/SEC
BH CV XLRA MEAS LEFT PROX ICA PSV: -71.6 CM/SEC
BH CV XLRA MEAS LEFT PROX SCLA PSV: 117 CM/SEC
BH CV XLRA MEAS LEFT VERTEBRAL A EDV: -7 CM/SEC
BH CV XLRA MEAS LEFT VERTEBRAL A PSV: -32.9 CM/SEC
BH CV XLRA MEAS RIGHT DIST CCA EDV: -12.6 CM/SEC
BH CV XLRA MEAS RIGHT DIST CCA PSV: -86.2 CM/SEC
BH CV XLRA MEAS RIGHT DIST ICA EDV: -9.8 CM/SEC
BH CV XLRA MEAS RIGHT DIST ICA PSV: -56.8 CM/SEC
BH CV XLRA MEAS RIGHT ICA/CCA RATIO: 0.94
BH CV XLRA MEAS RIGHT MID ICA EDV: -16.7 CM/SEC
BH CV XLRA MEAS RIGHT MID ICA PSV: -81.1 CM/SEC
BH CV XLRA MEAS RIGHT PROX CCA EDV: 8.8 CM/SEC
BH CV XLRA MEAS RIGHT PROX CCA PSV: 80.7 CM/SEC
BH CV XLRA MEAS RIGHT PROX ECA PSV: -67.3 CM/SEC
BH CV XLRA MEAS RIGHT PROX ICA EDV: -10.8 CM/SEC
BH CV XLRA MEAS RIGHT PROX ICA PSV: -84 CM/SEC
BH CV XLRA MEAS RIGHT PROX SCLA PSV: 104 CM/SEC
BH CV XLRA MEAS RIGHT VERTEBRAL A EDV: -9.4 CM/SEC
BH CV XLRA MEAS RIGHT VERTEBRAL A PSV: -38.6 CM/SEC
LEFT ARM BP: NORMAL MMHG
RIGHT ARM BP: NORMAL MMHG

## 2021-04-26 PROCEDURE — 93880 EXTRACRANIAL BILAT STUDY: CPT

## 2021-11-26 ENCOUNTER — HOSPITAL ENCOUNTER (EMERGENCY)
Facility: HOSPITAL | Age: 81
Discharge: HOME | End: 2021-11-26
Attending: EMERGENCY MEDICINE | Admitting: EMERGENCY MEDICINE

## 2021-11-26 ENCOUNTER — TELEPHONE (OUTPATIENT)
Dept: URGENT CARE | Facility: CLINIC | Age: 81
End: 2021-11-26

## 2021-11-26 ENCOUNTER — APPOINTMENT (OUTPATIENT)
Dept: CT IMAGING | Facility: HOSPITAL | Age: 81
End: 2021-11-26

## 2021-11-26 ENCOUNTER — APPOINTMENT (OUTPATIENT)
Dept: GENERAL RADIOLOGY | Facility: HOSPITAL | Age: 81
End: 2021-11-26

## 2021-11-26 VITALS
DIASTOLIC BLOOD PRESSURE: 76 MMHG | OXYGEN SATURATION: 100 % | TEMPERATURE: 97.1 F | SYSTOLIC BLOOD PRESSURE: 143 MMHG | BODY MASS INDEX: 23.7 KG/M2 | WEIGHT: 160 LBS | RESPIRATION RATE: 18 BRPM | HEIGHT: 69 IN | HEART RATE: 63 BPM

## 2021-11-26 DIAGNOSIS — U07.1 COVID-19 VIRUS INFECTION: Primary | ICD-10-CM

## 2021-11-26 DIAGNOSIS — R42 LIGHTHEADEDNESS: ICD-10-CM

## 2021-11-26 LAB
ALBUMIN SERPL-MCNC: 4.3 G/DL (ref 3.5–5.2)
ALBUMIN/GLOB SERPL: 1.7 G/DL
ALP SERPL-CCNC: 55 U/L (ref 39–117)
ALT SERPL W P-5'-P-CCNC: 12 U/L (ref 1–41)
ANION GAP SERPL CALCULATED.3IONS-SCNC: 9.6 MMOL/L (ref 5–15)
AST SERPL-CCNC: 15 U/L (ref 1–40)
BASOPHILS # BLD AUTO: 0 10*3/MM3 (ref 0–0.2)
BASOPHILS NFR BLD AUTO: 0 % (ref 0–1.5)
BILIRUB SERPL-MCNC: 1.1 MG/DL (ref 0–1.2)
BUN SERPL-MCNC: 13 MG/DL (ref 8–23)
BUN/CREAT SERPL: 13.1 (ref 7–25)
CALCIUM SPEC-SCNC: 9.1 MG/DL (ref 8.6–10.5)
CHLORIDE SERPL-SCNC: 102 MMOL/L (ref 98–107)
CO2 SERPL-SCNC: 27.4 MMOL/L (ref 22–29)
CREAT SERPL-MCNC: 0.99 MG/DL (ref 0.76–1.27)
D-LACTATE SERPL-SCNC: 1.1 MMOL/L (ref 0.5–2)
DEPRECATED RDW RBC AUTO: 39.4 FL (ref 37–54)
EOSINOPHIL # BLD AUTO: 0.01 10*3/MM3 (ref 0–0.4)
EOSINOPHIL NFR BLD AUTO: 0.3 % (ref 0.3–6.2)
ERYTHROCYTE [DISTWIDTH] IN BLOOD BY AUTOMATED COUNT: 13.3 % (ref 12.3–15.4)
GFR SERPL CREATININE-BSD FRML MDRD: 73 ML/MIN/1.73
GLOBULIN UR ELPH-MCNC: 2.5 GM/DL
GLUCOSE SERPL-MCNC: 135 MG/DL (ref 65–99)
HCT VFR BLD AUTO: 37.2 % (ref 37.5–51)
HGB BLD-MCNC: 12.9 G/DL (ref 13–17.7)
LYMPHOCYTES # BLD AUTO: 0.54 10*3/MM3 (ref 0.7–3.1)
LYMPHOCYTES NFR BLD AUTO: 17.1 % (ref 19.6–45.3)
MCH RBC QN AUTO: 28.4 PG (ref 26.6–33)
MCHC RBC AUTO-ENTMCNC: 34.7 G/DL (ref 31.5–35.7)
MCV RBC AUTO: 81.9 FL (ref 79–97)
MONOCYTES # BLD AUTO: 0.44 10*3/MM3 (ref 0.1–0.9)
MONOCYTES NFR BLD AUTO: 13.9 % (ref 5–12)
NEUTROPHILS NFR BLD AUTO: 2.16 10*3/MM3 (ref 1.7–7)
NEUTROPHILS NFR BLD AUTO: 68.4 % (ref 42.7–76)
NT-PROBNP SERPL-MCNC: 420.5 PG/ML (ref 0–1800)
PLATELET # BLD AUTO: 97 10*3/MM3 (ref 140–450)
PMV BLD AUTO: 9.6 FL (ref 6–12)
POTASSIUM SERPL-SCNC: 4.4 MMOL/L (ref 3.5–5.2)
PROCALCITONIN SERPL-MCNC: 0.1 NG/ML (ref 0–0.25)
PROT SERPL-MCNC: 6.8 G/DL (ref 6–8.5)
RBC # BLD AUTO: 4.54 10*6/MM3 (ref 4.14–5.8)
SODIUM SERPL-SCNC: 139 MMOL/L (ref 136–145)
TROPONIN T SERPL-MCNC: <0.01 NG/ML (ref 0–0.03)
WBC NRBC COR # BLD: 3.16 10*3/MM3 (ref 3.4–10.8)

## 2021-11-26 PROCEDURE — 80053 COMPREHEN METABOLIC PANEL: CPT | Performed by: EMERGENCY MEDICINE

## 2021-11-26 PROCEDURE — 99284 EMERGENCY DEPT VISIT MOD MDM: CPT

## 2021-11-26 PROCEDURE — 0 IOPAMIDOL PER 1 ML: Performed by: EMERGENCY MEDICINE

## 2021-11-26 PROCEDURE — 85025 COMPLETE CBC W/AUTO DIFF WBC: CPT | Performed by: EMERGENCY MEDICINE

## 2021-11-26 PROCEDURE — 84145 PROCALCITONIN (PCT): CPT | Performed by: EMERGENCY MEDICINE

## 2021-11-26 PROCEDURE — 71045 X-RAY EXAM CHEST 1 VIEW: CPT

## 2021-11-26 PROCEDURE — 71275 CT ANGIOGRAPHY CHEST: CPT

## 2021-11-26 PROCEDURE — 87040 BLOOD CULTURE FOR BACTERIA: CPT | Performed by: EMERGENCY MEDICINE

## 2021-11-26 PROCEDURE — 93005 ELECTROCARDIOGRAM TRACING: CPT | Performed by: EMERGENCY MEDICINE

## 2021-11-26 PROCEDURE — 83880 ASSAY OF NATRIURETIC PEPTIDE: CPT | Performed by: EMERGENCY MEDICINE

## 2021-11-26 PROCEDURE — 83605 ASSAY OF LACTIC ACID: CPT | Performed by: EMERGENCY MEDICINE

## 2021-11-26 PROCEDURE — 36415 COLL VENOUS BLD VENIPUNCTURE: CPT

## 2021-11-26 PROCEDURE — 84484 ASSAY OF TROPONIN QUANT: CPT | Performed by: EMERGENCY MEDICINE

## 2021-11-26 RX ADMIN — SODIUM CHLORIDE 1000 ML: 9 INJECTION, SOLUTION INTRAVENOUS at 12:53

## 2021-11-26 RX ADMIN — IOPAMIDOL 95 ML: 755 INJECTION, SOLUTION INTRAVENOUS at 15:04

## 2021-11-29 ENCOUNTER — TRANSCRIBE ORDERS (OUTPATIENT)
Dept: ADMINISTRATIVE | Facility: HOSPITAL | Age: 81
End: 2021-11-29

## 2021-11-29 DIAGNOSIS — U07.1 COVID-19 VIRUS INFECTION: Primary | ICD-10-CM

## 2021-11-29 DIAGNOSIS — U07.1 CLINICAL DIAGNOSIS OF SEVERE ACUTE RESPIRATORY SYNDROME CORONAVIRUS 2 (SARS-COV-2) DISEASE: Primary | ICD-10-CM

## 2021-11-29 RX ORDER — PREDNISONE 10 MG/1
TABLET ORAL
Qty: 56 TABLET | Refills: 0 | Status: SHIPPED | OUTPATIENT
Start: 2021-11-29 | End: 2022-03-25

## 2021-11-30 ENCOUNTER — HOSPITAL ENCOUNTER (OUTPATIENT)
Dept: INFUSION THERAPY | Facility: HOSPITAL | Age: 81
Discharge: HOME | End: 2021-11-30
Admitting: INTERNAL MEDICINE

## 2021-11-30 VITALS
OXYGEN SATURATION: 99 % | HEART RATE: 72 BPM | RESPIRATION RATE: 18 BRPM | SYSTOLIC BLOOD PRESSURE: 132 MMHG | TEMPERATURE: 96.9 F | DIASTOLIC BLOOD PRESSURE: 73 MMHG

## 2021-11-30 DIAGNOSIS — U07.1 COVID: Primary | ICD-10-CM

## 2021-11-30 PROCEDURE — 96365 THER/PROPH/DIAG IV INF INIT: CPT

## 2021-11-30 PROCEDURE — 25010000002 INJECTION, CASIRIVIMAB AND IMDEVIMAB, 1200 MG: Performed by: INTERNAL MEDICINE

## 2021-11-30 PROCEDURE — M0243 CASIRIVI AND IMDEVI INFUSION: HCPCS | Performed by: INTERNAL MEDICINE

## 2021-11-30 RX ORDER — SODIUM CHLORIDE 9 MG/ML
30 INJECTION, SOLUTION INTRAVENOUS ONCE
Status: CANCELLED | OUTPATIENT
Start: 2021-11-30

## 2021-11-30 RX ORDER — METHYLPREDNISOLONE SODIUM SUCCINATE 125 MG/2ML
125 INJECTION, POWDER, LYOPHILIZED, FOR SOLUTION INTRAMUSCULAR; INTRAVENOUS AS NEEDED
Status: CANCELLED | OUTPATIENT
Start: 2021-11-30

## 2021-11-30 RX ORDER — DIPHENHYDRAMINE HYDROCHLORIDE 50 MG/ML
50 INJECTION INTRAMUSCULAR; INTRAVENOUS ONCE AS NEEDED
Status: CANCELLED | OUTPATIENT
Start: 2021-11-30

## 2021-11-30 RX ORDER — SODIUM CHLORIDE 9 MG/ML
30 INJECTION, SOLUTION INTRAVENOUS ONCE
Status: COMPLETED | OUTPATIENT
Start: 2021-11-30 | End: 2021-11-30

## 2021-11-30 RX ORDER — DIPHENHYDRAMINE HCL 25 MG
50 CAPSULE ORAL ONCE AS NEEDED
Status: DISCONTINUED | OUTPATIENT
Start: 2021-11-30 | End: 2021-12-02 | Stop reason: HOSPADM

## 2021-11-30 RX ORDER — DIPHENHYDRAMINE HCL 25 MG
50 CAPSULE ORAL ONCE AS NEEDED
Status: CANCELLED | OUTPATIENT
Start: 2021-11-30

## 2021-11-30 RX ORDER — DIPHENHYDRAMINE HCL 50 MG
50 CAPSULE ORAL ONCE AS NEEDED
Status: CANCELLED | OUTPATIENT
Start: 2021-11-30

## 2021-11-30 RX ORDER — METHYLPREDNISOLONE SODIUM SUCCINATE 125 MG/2ML
125 INJECTION, POWDER, LYOPHILIZED, FOR SOLUTION INTRAMUSCULAR; INTRAVENOUS AS NEEDED
Status: DISCONTINUED | OUTPATIENT
Start: 2021-11-30 | End: 2021-12-02 | Stop reason: HOSPADM

## 2021-11-30 RX ORDER — DIPHENHYDRAMINE HYDROCHLORIDE 50 MG/ML
50 INJECTION INTRAMUSCULAR; INTRAVENOUS ONCE AS NEEDED
Status: DISCONTINUED | OUTPATIENT
Start: 2021-11-30 | End: 2021-12-02 | Stop reason: HOSPADM

## 2021-11-30 RX ORDER — EPINEPHRINE 1 MG/ML
0.3 INJECTION, SOLUTION, CONCENTRATE INTRAVENOUS AS NEEDED
Status: CANCELLED | OUTPATIENT
Start: 2021-11-30

## 2021-11-30 RX ADMIN — IMDEVIMAB: 1332 INJECTION, SOLUTION, CONCENTRATE INTRAVENOUS at 15:06

## 2021-11-30 RX ADMIN — SODIUM CHLORIDE 30 ML: 9 INJECTION, SOLUTION INTRAVENOUS at 15:05

## 2021-12-01 LAB
BACTERIA SPEC AEROBE CULT: NORMAL
BACTERIA SPEC AEROBE CULT: NORMAL

## 2021-12-03 ENCOUNTER — TELEPHONE (OUTPATIENT)
Dept: INTERNAL MEDICINE | Facility: CLINIC | Age: 81
End: 2021-12-03

## 2021-12-03 NOTE — TELEPHONE ENCOUNTER
Caller: Claudia Mendoza    Relationship: Emergency Contact    Best call back number:  [phone]     What is the best time to reach you: ANYTIME     Who are you requesting to speak with (clinical staff, provider,  specific staff member): DR. VELÁSQUEZ     Do you know the name of the person who called: N/A     What was the call regarding: PATIENT HAS HAD THE COVID INFUSION AND TAKEN PREDNISONE AND COUGH MEDICINE AND CLAUDIA NOTICED PATIENT COUGH IS NO VERY PRODUCTIVE AND WANTS TO DISCUSS WHAT SHE IS DOING AND HOW HE IS DOING . PLEASE CALL AND ADVISE .      Do you require a callback: YES

## 2021-12-03 NOTE — TELEPHONE ENCOUNTER
Please call patient's wife and find out more details.  This message does not really make any sense.  It sounds like that his wife noticed the cough was not very productive and that is not unusual.  The main thing she needs to be on the look out for is complaints of shortness of breath.

## 2022-03-18 ENCOUNTER — LAB (OUTPATIENT)
Dept: LAB | Facility: HOSPITAL | Age: 82
End: 2022-03-18

## 2022-03-18 DIAGNOSIS — N40.1 BENIGN NON-NODULAR PROSTATIC HYPERPLASIA WITH LOWER URINARY TRACT SYMPTOMS: Chronic | ICD-10-CM

## 2022-03-18 DIAGNOSIS — Z51.81 THERAPEUTIC DRUG MONITORING: ICD-10-CM

## 2022-03-18 DIAGNOSIS — R16.1 SPLENOMEGALY: Chronic | ICD-10-CM

## 2022-03-18 DIAGNOSIS — D69.6 THROMBOCYTOPENIA: ICD-10-CM

## 2022-03-18 DIAGNOSIS — E55.9 VITAMIN D DEFICIENCY: Chronic | ICD-10-CM

## 2022-03-18 DIAGNOSIS — E78.2 MIXED HYPERLIPIDEMIA: Chronic | ICD-10-CM

## 2022-03-18 LAB
25(OH)D3 SERPL-MCNC: 24.4 NG/ML (ref 30–100)
ALBUMIN SERPL-MCNC: 4.4 G/DL (ref 3.5–5.2)
ALBUMIN/GLOB SERPL: 2.2 G/DL
ALP SERPL-CCNC: 51 U/L (ref 39–117)
ALT SERPL W P-5'-P-CCNC: 15 U/L (ref 1–41)
ANION GAP SERPL CALCULATED.3IONS-SCNC: 6 MMOL/L (ref 5–15)
AST SERPL-CCNC: 17 U/L (ref 1–40)
BACTERIA UR QL AUTO: ABNORMAL /HPF
BILIRUB SERPL-MCNC: 0.8 MG/DL (ref 0–1.2)
BILIRUB UR QL STRIP: NEGATIVE
BUN SERPL-MCNC: 14 MG/DL (ref 8–23)
BUN/CREAT SERPL: 13.2 (ref 7–25)
CALCIUM SPEC-SCNC: 9.3 MG/DL (ref 8.6–10.5)
CHLORIDE SERPL-SCNC: 106 MMOL/L (ref 98–107)
CK SERPL-CCNC: 45 U/L (ref 20–200)
CLARITY UR: CLEAR
CO2 SERPL-SCNC: 30 MMOL/L (ref 22–29)
COLOR UR: YELLOW
CREAT SERPL-MCNC: 1.06 MG/DL (ref 0.76–1.27)
DEPRECATED RDW RBC AUTO: 41.2 FL (ref 37–54)
EGFRCR SERPLBLD CKD-EPI 2021: 70.5 ML/MIN/1.73
ERYTHROCYTE [DISTWIDTH] IN BLOOD BY AUTOMATED COUNT: 13.2 % (ref 12.3–15.4)
GLOBULIN UR ELPH-MCNC: 2 GM/DL
GLUCOSE SERPL-MCNC: 97 MG/DL (ref 65–99)
GLUCOSE UR STRIP-MCNC: NEGATIVE MG/DL
HCT VFR BLD AUTO: 38.6 % (ref 37.5–51)
HGB BLD-MCNC: 12.6 G/DL (ref 13–17.7)
HGB UR QL STRIP.AUTO: ABNORMAL
HYALINE CASTS UR QL AUTO: ABNORMAL /LPF
KETONES UR QL STRIP: NEGATIVE
LEUKOCYTE ESTERASE UR QL STRIP.AUTO: NEGATIVE
MCH RBC QN AUTO: 27.9 PG (ref 26.6–33)
MCHC RBC AUTO-ENTMCNC: 32.6 G/DL (ref 31.5–35.7)
MCV RBC AUTO: 85.4 FL (ref 79–97)
NITRITE UR QL STRIP: NEGATIVE
PH UR STRIP.AUTO: 5.5 [PH] (ref 5–8)
PLATELET # BLD AUTO: 110 10*3/MM3 (ref 140–450)
PMV BLD AUTO: 10 FL (ref 6–12)
POTASSIUM SERPL-SCNC: 4.6 MMOL/L (ref 3.5–5.2)
PROT SERPL-MCNC: 6.4 G/DL (ref 6–8.5)
PROT UR QL STRIP: NEGATIVE
PSA SERPL-MCNC: 4.57 NG/ML (ref 0–4)
RBC # BLD AUTO: 4.52 10*6/MM3 (ref 4.14–5.8)
RBC # UR STRIP: ABNORMAL /HPF
REF LAB TEST METHOD: ABNORMAL
SODIUM SERPL-SCNC: 142 MMOL/L (ref 136–145)
SP GR UR STRIP: 1.02 (ref 1–1.03)
SQUAMOUS #/AREA URNS HPF: ABNORMAL /HPF
T3FREE SERPL-MCNC: 3.42 PG/ML (ref 2–4.4)
T4 FREE SERPL-MCNC: 1.28 NG/DL (ref 0.93–1.7)
TSH SERPL DL<=0.05 MIU/L-ACNC: 3.04 UIU/ML (ref 0.27–4.2)
UROBILINOGEN UR QL STRIP: ABNORMAL
WBC # UR STRIP: ABNORMAL /HPF
WBC NRBC COR # BLD: 3.05 10*3/MM3 (ref 3.4–10.8)

## 2022-03-18 PROCEDURE — 82306 VITAMIN D 25 HYDROXY: CPT

## 2022-03-18 PROCEDURE — 81001 URINALYSIS AUTO W/SCOPE: CPT

## 2022-03-18 PROCEDURE — 85027 COMPLETE CBC AUTOMATED: CPT

## 2022-03-18 PROCEDURE — 36415 COLL VENOUS BLD VENIPUNCTURE: CPT

## 2022-03-18 PROCEDURE — 80061 LIPID PANEL: CPT

## 2022-03-18 PROCEDURE — 84481 FREE ASSAY (FT-3): CPT

## 2022-03-18 PROCEDURE — 84443 ASSAY THYROID STIM HORMONE: CPT

## 2022-03-18 PROCEDURE — 82550 ASSAY OF CK (CPK): CPT

## 2022-03-18 PROCEDURE — 80053 COMPREHEN METABOLIC PANEL: CPT

## 2022-03-18 PROCEDURE — 84439 ASSAY OF FREE THYROXINE: CPT

## 2022-03-18 PROCEDURE — 83704 LIPOPROTEIN BLD QUAN PART: CPT

## 2022-03-18 PROCEDURE — 84153 ASSAY OF PSA TOTAL: CPT

## 2022-03-20 LAB
CHOLEST SERPL-MCNC: 110 MG/DL (ref 100–199)
HDL SERPL-SCNC: 31.6 UMOL/L
HDLC SERPL-MCNC: 40 MG/DL
LDL SERPL QN: 20 NM
LDL SERPL-SCNC: 543 NMOL/L
LDL SMALL SERPL-SCNC: 355 NMOL/L
LDLC SERPL CALC-MCNC: 49 MG/DL (ref 0–99)
TRIGL SERPL-MCNC: 117 MG/DL (ref 0–149)

## 2022-03-25 ENCOUNTER — OFFICE VISIT (OUTPATIENT)
Dept: INTERNAL MEDICINE | Facility: CLINIC | Age: 82
End: 2022-03-25

## 2022-03-25 VITALS
HEART RATE: 72 BPM | WEIGHT: 160.6 LBS | SYSTOLIC BLOOD PRESSURE: 128 MMHG | HEIGHT: 69 IN | DIASTOLIC BLOOD PRESSURE: 70 MMHG | RESPIRATION RATE: 18 BRPM | OXYGEN SATURATION: 99 % | BODY MASS INDEX: 23.79 KG/M2

## 2022-03-25 DIAGNOSIS — N40.1 BENIGN NON-NODULAR PROSTATIC HYPERPLASIA WITH LOWER URINARY TRACT SYMPTOMS: Chronic | ICD-10-CM

## 2022-03-25 DIAGNOSIS — R16.1 SPLENOMEGALY: Chronic | ICD-10-CM

## 2022-03-25 DIAGNOSIS — K21.9 GASTROESOPHAGEAL REFLUX DISEASE WITHOUT ESOPHAGITIS: Chronic | ICD-10-CM

## 2022-03-25 DIAGNOSIS — Z98.61 HISTORY OF PTCA: Chronic | ICD-10-CM

## 2022-03-25 DIAGNOSIS — I25.2 HISTORY OF MYOCARDIAL INFARCTION: Chronic | ICD-10-CM

## 2022-03-25 DIAGNOSIS — R41.3 MEMORY LOSS: ICD-10-CM

## 2022-03-25 DIAGNOSIS — I65.23 ATHEROSCLEROSIS OF BOTH CAROTID ARTERIES: Chronic | ICD-10-CM

## 2022-03-25 DIAGNOSIS — Z12.11 COLON CANCER SCREENING: ICD-10-CM

## 2022-03-25 DIAGNOSIS — E78.2 MIXED HYPERLIPIDEMIA: Chronic | ICD-10-CM

## 2022-03-25 DIAGNOSIS — R31.29 MICROSCOPIC HEMATURIA: Chronic | ICD-10-CM

## 2022-03-25 DIAGNOSIS — E55.9 VITAMIN D DEFICIENCY: Chronic | ICD-10-CM

## 2022-03-25 DIAGNOSIS — D69.6 THROMBOCYTOPENIA: Chronic | ICD-10-CM

## 2022-03-25 DIAGNOSIS — Z00.00 ENCOUNTER FOR SUBSEQUENT ANNUAL WELLNESS VISIT (AWV) IN MEDICARE PATIENT: Primary | ICD-10-CM

## 2022-03-25 DIAGNOSIS — M15.9 GENERALIZED OSTEOARTHRITIS OF MULTIPLE SITES: Chronic | ICD-10-CM

## 2022-03-25 DIAGNOSIS — I25.10 CORONARY ARTERY DISEASE INVOLVING NATIVE CORONARY ARTERY OF NATIVE HEART WITHOUT ANGINA PECTORIS: Chronic | ICD-10-CM

## 2022-03-25 DIAGNOSIS — I10 BENIGN ESSENTIAL HYPERTENSION: Chronic | ICD-10-CM

## 2022-03-25 DIAGNOSIS — Z86.16 HISTORY OF 2019 NOVEL CORONAVIRUS DISEASE (COVID-19): ICD-10-CM

## 2022-03-25 DIAGNOSIS — R97.20 ELEVATED PSA: Chronic | ICD-10-CM

## 2022-03-25 DIAGNOSIS — Z51.81 THERAPEUTIC DRUG MONITORING: ICD-10-CM

## 2022-03-25 PROCEDURE — 1125F AMNT PAIN NOTED PAIN PRSNT: CPT | Performed by: INTERNAL MEDICINE

## 2022-03-25 PROCEDURE — 96160 PT-FOCUSED HLTH RISK ASSMT: CPT | Performed by: INTERNAL MEDICINE

## 2022-03-25 PROCEDURE — 99214 OFFICE O/P EST MOD 30 MIN: CPT | Performed by: INTERNAL MEDICINE

## 2022-03-25 PROCEDURE — 1170F FXNL STATUS ASSESSED: CPT | Performed by: INTERNAL MEDICINE

## 2022-03-25 PROCEDURE — 1159F MED LIST DOCD IN RCRD: CPT | Performed by: INTERNAL MEDICINE

## 2022-03-25 PROCEDURE — G0439 PPPS, SUBSEQ VISIT: HCPCS | Performed by: INTERNAL MEDICINE

## 2022-03-25 RX ORDER — ROSUVASTATIN CALCIUM 20 MG/1
TABLET, COATED ORAL
Qty: 90 TABLET | Refills: 3
Start: 2022-03-25 | End: 2022-05-26 | Stop reason: SDUPTHER

## 2022-03-25 RX ORDER — METOPROLOL SUCCINATE 50 MG/1
TABLET, EXTENDED RELEASE ORAL
Qty: 90 TABLET | Refills: 3 | Status: SHIPPED | OUTPATIENT
Start: 2022-03-25 | End: 2022-03-25 | Stop reason: SDUPTHER

## 2022-03-25 RX ORDER — METOPROLOL SUCCINATE 50 MG/1
TABLET, EXTENDED RELEASE ORAL
Qty: 90 TABLET | Refills: 3
Start: 2022-03-25 | End: 2022-05-26 | Stop reason: SDUPTHER

## 2022-03-25 RX ORDER — ROSUVASTATIN CALCIUM 20 MG/1
TABLET, COATED ORAL
Qty: 90 TABLET | Refills: 3 | Status: SHIPPED | OUTPATIENT
Start: 2022-03-25 | End: 2022-03-25 | Stop reason: SDUPTHER

## 2022-03-25 NOTE — PROGRESS NOTES
03/25/2022    Patient Information  Sergey Mendoza                                                                                          [address]  [facility] [address]      1940  [unfilled]  There is no work phone number on file.    Chief Complaint:     Medicare wellness visit.  Follow-up lab work in order to monitor chronic medical issues listed in history of present illness.  No new acute complaints.    History of Present Illness:    Patient with hyperlipidemia, hypertension, carotid artery plaque, coronary artery disease, history of myocardial infarction and PTCA, BPH with elevated PSA, vitamin D deficiency, generalized osteoarthritis, esophageal reflux, splenomegaly and thrombocytopenia, microscopic hematuria, complaints of memory loss, history of COVID-19 infection.  He presents today for a subsequent Medicare wellness visit.  Patient also had lab work in order to monitor his chronic medical issues.  His past medical history reviewed and updated were necessary including health maintenance parameters.  This reveals he will be up-to-date or else accounted for after today's visit.    Review of Systems   Constitutional: Negative.   HENT: Negative.    Eyes: Negative.    Cardiovascular: Negative.    Respiratory: Negative.    Endocrine: Negative.    Hematologic/Lymphatic: Negative.    Skin: Negative.    Musculoskeletal: Positive for arthritis and joint pain.   Gastrointestinal: Negative.    Genitourinary: Negative.    Neurological: Negative.    Psychiatric/Behavioral: Negative.    Allergic/Immunologic: Negative.        Active Problems:    Patient Active Problem List   Diagnosis   • Benign essential hypertension   • Benign prostatic hypertrophy, 07/07/2008--negative biopsy.   • Carotid artery plaque, 4/26/2021--mild bilateral.  09/10/2018--mild bilateral. 08/11/2016--mild bilateral carotid artery plaque.   • Coronary artery disease involving native coronary artery of native heart without  angina pectoris   • Diverticulosis of colon   • Gastroesophageal reflux disease without esophagitis   • Generalized osteoarthritis of multiple sites   • Hyperlipidemia   • History of myocardial infarction, 10/08/2010--patient presented with an anterior myocardial infarction.   • Primary osteoarthritis of right knee   • History of PTCA, 10/08/2010--anterior MI.  EF 40%.  PTCA with drug-eluting stent proximal LAD.  10% LM.  100% diagonal 1.  30% diffuse circumflex.  30% diffuse RCA.  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   • History of 2019 novel coronavirus disease (COVID-19)         Past Medical History:   Diagnosis Date   • Asymptomatic cholelithiasis 02/19/2018   • Benign essential hypertension 03/24/2006    Diagnosed approximately March 2006.   • Benign prostatic hypertrophy, 07/07/2008--negative biopsy. 07/07/2008    Patient had a history of elevated PSA and found to have benign prostatic hypertrophy.   07/07/2008--negative prostate biopsy.   • Carotid artery plaque, 4/26/2021--mild bilateral.  09/10/2018--mild bilateral. 08/11/2016--mild bilateral carotid artery plaque. 04/21/2009 April 26, 2021--carotid Doppler study reveals mild bilateral carotid plaque.  September 10, 2018--carotid Doppler study reveals mild bilateral carotid artery plaque.  08/11/2016--carotid Doppler study reveals mild bilateral carotid plaque.  05/28/2014--vascular screen reveals mild bilateral carotid plaque, negative for AAA, negative for PAD.   03/30/2011--vascular screen revealed mild bilateral ca   • COVID-19     Monoclonal antibody infusion on 11/30/2021   • Diverticulosis of colon 01/09/2012 01/09/2012--normal colonoscopy except for diffuse diverticulosis.   2004--colonoscopy  normal.   • Elevated PSA 11/06/2013 02/13/2017--patient seen in follow-up and reports he is followed on a yearly basis by the urologist.  Last follow-up was in December 2016 and his PSA was less than 5.  07/28/2016--PSA elevated at 4.88.  03/19/2014--he was seen in followup and his PSA was back down to 3.9. Patient was not having any urinary symptoms.  12/11/2013--patient was evaluated by the urologist and he elected to just observe the PSA.   11/06/2013--patient seen in followup in his PSA returned even higher at 5.7. He was referred back to urology.   11/06/2013--PSA returned elevated at 4.3. Patient did have some urinary symptoms consisting of periods of weak stream and dribbling. He had never been treated for chronic prostatitis. I treated him with ciprofloxacin 500 mg for 30 days.       • Gastroesophageal reflux disease without esophagitis 03/24/2016   • Generalized osteoarthritis of multiple sites 03/24/2016   • History of cardiac catheterization 10/08/2010    10/08/2010--patient presented with an anterior myocardial infarction. Heart catheterization revealed anterior/apical hypokinesis with 40% ejection fraction. Severe 80% ostial LAD stenosis. Mild 10% left main. Mild diffuse LAD. Totally occluded diagonal one. 30% diffuse circumflex. Mild 30% percent diffuse RCA. Angioplasty with drug eluding stent performed to the proximal LAD.    • History of Chronic Duodenitis 01/09/2012 01/09/2012--EGD revealed mild distal erythema of the stomach and proximal duodenum.   • History of closed Colles' fracture of left radius 05/05/1953    13 years of age.   • History of iron deficiency anemia 06/23/2012 02/07/2017--hemoglobin normal at 14.2, hematocrit normal at 45.1.  Serum iron normal at 105.  Iron saturation normal at 30%.  Resolve this issue.  08/02/2016--patient seen in follow-up.  White count is low at 3.66.  Hemoglobin low at 13.3.  Hematocrit normal at 41.8.  Platelets low at 109.  Homocystine and  methylmalonic acid are normal.  Serum iron normal at 98.  Iron saturation normal at 28.  Patient is taking iron sulfate 325 mg daily.  10/13/2013--CBC was entirely normal, anemia resolved we'll continue to monitor.  06/23/2012--patient treated for iron deficiency anemia caused by duodenitis which was revealed per EGD 01/09/2012.    • History of myocardial infarction, 10/08/2010--patient presented with an anterior myocardial infarction. 10/08/2010    Stress test 02/23/2011--mild anterior wall ischemia, ejection fraction improved to 64%.  10/08/2010--patient presented with an anterior myocardial infarction. Heart catheterization revealed anterior/apical hypokinesis with 40% ejection fraction. Severe 80% ostial LAD stenosis. Mild 10% left main. Mild diffuse LAD. Totally occluded diagonal one. 30% diffuse circumflex. Mild 30% percent diffuse RCA. Angioplasty with drug eluding stent performed to the proximal LAD.    • History of PTCA, 10/08/2010--anterior MI.  EF 40%.  PTCA with drug-eluting stent proximal LAD.  10% LM.  100% diagonal 1.  30% diffuse circumflex.  30% diffuse RCA.  2011--EF 64% 10/08/2010    Stress test 02/23/2011--mild anterior wall ischemia, ejection fraction improved to 64%.  10/08/2010--patient presented with an anterior myocardial infarction. Heart catheterization revealed anterior/apical hypokinesis with 40% ejection fraction. Severe 80% ostial LAD stenosis. Mild 10% left main. Mild diffuse LAD. Totally occluded diagonal one. 30% diffuse circumflex. Mild 30% percent diffuse RCA. Angioplasty with drug eluding stent performed to the proximal LAD.    • History of Tear of medial meniscus of right knee 10/17/2014    12/09/2014--patient was evaluated by the orthopedist. He felt that patient also had end stage osteoarthritis the right knee and will eventually need a total knee replacement. In the meantime he gave a cortisone injection in the knee and patient reports this did help. Patient also went to  physical therapy which seemed to help somewhat also.   10/29/2014--MRI of the right knee reveals tricompartmental osteoarthritis that is severe in the medial compartment. There is a degenerative tear of the medial meniscus. There is a joint effusion with Baker's cyst. Intramuscular extension of the Baker's cyst into the medial head of the gastrocnemius muscle. Patient referred to orthopedics.   10/17/2014--x-ray of the right knee reveals a small suprapatellar effusion. Narrowing of the medial compartment. There is patellar spur formation. No fracture, dislocation, bone lesion is demonstrated.   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 He was trying to sit down and was in an awkward position.    • Hyperlipidemia 03/06/2011 03/16/2011--treatment for hyperlipidemia and initiated.   • Internal hemorrhoids 03/24/2016   • Microscopic hematuria 02/19/2018 12/20/2017--patient was evaluated by the urologist for a routine follow-up elevated PSA.  He was noted to have microscopic hematuria with 3-5 RBCs.  Cystoscopy was reportedly negative.  Patient also had a CT scan of the abdomen and pelvis which revealed asymptomatic cholelithiasis, diffuse atherosclerosis, and enlarged prostate.   • Multiple actinic keratoses 08/02/2016 08/02/2016--patient presents with multiple actinic keratoses as well as seborrheic keratoses.  Dermatology referral given.   • Occlusive coronary artery disease, 10/08/2010--anterior MI.  EF 40%.  PTCA with drug-eluting stent proximal LAD.  10% LM.  100% diagonal 1.  30% diffuse circumflex.  30% diffuse RCA. 10/08/2010    02/23/2011--stress Cardiolite reveals mild anterior wall ischemia, ejection fraction improved to 64%.  10/08/2010--patient presented with an anterior myocardial infarction. Heart catheterization revealed anterior/apical hypokinesis with 40% ejection fraction. Severe 80% ostial LAD stenosis. Mild 10% left main. Mild diffuse LAD. Totally occluded  diagonal one. 30% diffuse circumflex. Mild 30% percent diffuse RCA. Angioplasty with drug eluding stent performed to the proximal LAD.   10/08/2010--anterior MI.  EF 40%.  PTCA with drug-eluting stent proximal LAD.  10% LM.  100% diagonal 1.  30% diffuse circumflex.  30% diffuse RCA.   • Primary osteoarthritis of right knee 10/17/2014    12/09/2014--patient was evaluated by the orthopedist. He felt that patient also had end stage osteoarthritis the right knee and will eventually need a total knee replacement. In the meantime he gave a cortisone injection in the knee and patient reports this did help. Patient also went to physical therapy which seemed to help somewhat also.  10/29/2014--MRI of the right knee reveals tricompartmental osteoarthritis that is severe in the medial compartment. There is a degenerative tear of the medial meniscus. There is a joint effusion with Baker's cyst. Intramuscular extension of the Baker's cyst into the medial head of the gastrocnemius muscle. Patient referred to orthopedics.   10/17/2014--x-ray of the right knee reveals a small suprapatellar effusion. Narrowing of the medial compartment. There is patellar spur formation. No fracture, dislocation, bone lesion is demonstrated.   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 He was trying to sit down and was in an awkward position. T   • Splenomegaly 08/02/2016 09/06/2013--CT scan reveals mild splenomegaly.  Although not in the report, the radiologist did compare the CT scan from 09/06/2013 to the one from 2010.  The spleen actually looks smaller in size, 13.5 cm compared to 15 cm previously.  In another direction, spleen is 20 cm as compared to 24 cm.  The last dimension was the maximum length.  08/04/2010--initial evaluation by the hematologist. WBCs 3.5. Differential normal. Absolute neutrophil count 2500. Hemoglobin normal at 14.2. MCV low at 83. Platelet count low at 125. IPF normal at 4.6%. Review of old  labs through 1997 revealed a white blood cell count has fluctuated between 2.8 and 3.9. There is no trend downward but it is fluctuating. Platelet count has ranged between 148 and 198. MCV has been low. Liver spleen scan revealed mild splenomegaly. B12, folic acid, iron studies, rheumatoid factor, SPEP have all been normal. Felt to be mild pancytopenia secondary to splenomegaly. Patient is followed on a regular basis by the hematologist.   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 03/24/2021   • Vitamin D deficiency 03/24/2016         Past Surgical History:   Procedure Laterality Date   • CARDIAC CATHETERIZATION  10/08/2010    See past medical history   • COLONOSCOPY  01/09/2012 01/09/2012--normal colonoscopy except for diffuse diverticulosis.    • COLONOSCOPY  2004 2004--colonoscopy normal.   • CORONARY ANGIOPLASTY WITH STENT PLACEMENT  10/10/2010    10/10/2010--critical 80% proximal LAD stenosis treated using a drug-coated stent. Unable to cross diagonal one.   • ESOPHAGOSCOPY / EGD  01/09/2012 01/09/2012--EGD performed for iron deficiency anemia revealed mild erythema of the distal stomach and proximal duodenum consistent with duodenitis.   • PROSTATE BIOPSY  07/07/2008 07/07/2008--negative prostate biopsy.  Patient had a history of elevated PSA and found to have benign prostatic hypertrophy.          Allergies   Allergen Reactions   • Penicillins    • Sulfa Antibiotics            Current Outpatient Medications:   •  aspirin 81 MG tablet, Take 81 mg by mouth Daily., Disp: , Rfl:   •  Coenzyme Q10 (COQ10) 400 MG capsule, Take 1 capsule by mouth daily. Take with a meal., Disp: , Rfl:   •  metoprolol succinate XL (TOPROL-XL) 50 MG 24 hr tablet, Take 1 p.o. daily for blood pressure and heart, Disp: 90 tablet, Rfl: 3  •  rosuvastatin (CRESTOR) 20 MG tablet, Take 1 p.o. daily for high cholesterol, Disp: 90 tablet, Rfl: 3  •  triamcinolone (KENALOG) 0.1 % cream, Apply  topically to the  "appropriate area as directed 3 (Three) Times a Day. As needed rash/itching, Disp: 60 g, Rfl: 0  •  vitamin D3 125 MCG (5000 UT) capsule capsule, 1 by mouth daily as directed, Disp: 30 capsule, Rfl:       Family History   Problem Relation Age of Onset   • Coronary artery disease Mother    • Stroke Mother    • No Known Problems Father    • Cancer Maternal Grandmother    • Heart attack Maternal Grandfather    • Heart disease Maternal Grandfather    • No Known Problems Paternal Grandmother    • No Known Problems Paternal Grandfather          Social History     Socioeconomic History   • Marital status:    Tobacco Use   • Smoking status: Former Smoker   • Smokeless tobacco: Never Used   • Tobacco comment: Stopped smoking in his early 20s.   Vaping Use   • Vaping Use: Never used   Substance and Sexual Activity   • Alcohol use: Yes     Alcohol/week: 1.0 standard drink     Types: 1 Glasses of wine per week     Comment: Socially   • Drug use: No   • Sexual activity: Yes     Partners: Female         Vitals:    03/25/22 0727   BP: 128/70   Pulse: 72   Resp: 18   SpO2: 99%   Weight: 72.8 kg (160 lb 9.6 oz)   Height: 175.3 cm (69\")        Body mass index is 23.72 kg/m².      Physical Exam:    General: Alert and oriented x 3.  No acute distress.  Normal affect.  HEENT: Pupils equal, round, reactive to light; extraocular movements intact; sclerae nonicteric; pharynx, ear canals and TMs normal.  Neck: Without JVD, thyromegaly, bruit, or adenopathy.  Lungs: Clear to auscultation in all fields.  Heart: Regular rate and rhythm without murmur, rub, gallop, or click.  Abdomen: Soft, nontender, without hepatosplenomegaly or hernia.  Bowel sounds normal.  : Deferred.  Rectal: Deferred.  Extremities: Without clubbing, cyanosis, edema, or pulse deficit.  Neurologic: Intact without focal deficit.  Normal station and gait observed during ingress and egress from the examination room.  Skin: Without significant lesion.  Musculoskeletal: " Unremarkable.    Lab/other results:    CBC is normal except hemoglobin low at 12.6.  Hematocrit normal at 38.6.  Platelets low at 110.  CPK normal.  CMP normal.  Cholesterol is perfect with a total cholesterol 110, triglycerides 117, LDL particle #543, HDL particle #31.6.  Vitamin D is low at 24.4.  Urinalysis reveals 3-5 RBCs.  Thyroid function tests are normal.  PSA elevated at 4.57.    April 26, 2021--carotid Doppler study reveals mild bilateral carotid plaque.    Assessment/Plan:     Diagnosis Plan   1. Encounter for subsequent annual wellness visit (AWV) in Medicare patient     2. Hyperlipidemia  CK    Comprehensive Metabolic Panel    NMR LipoProfile    TSH    T4, Free    T3, Free    rosuvastatin (CRESTOR) 20 MG tablet   3. Benign essential hypertension  metoprolol succinate XL (TOPROL-XL) 50 MG 24 hr tablet   4. Carotid artery plaque, 09/10/2018--mild bilateral. 08/11/2016--mild bilateral carotid artery plaque.     5. Coronary artery disease involving native coronary artery of native heart without angina pectoris  metoprolol succinate XL (TOPROL-XL) 50 MG 24 hr tablet   6. History of myocardial infarction, 10/08/2010--patient presented with an anterior myocardial infarction.     7. History of PTCA, 10/08/2010--anterior MI.  EF 40%.  PTCA with drug-eluting stent proximal LAD.  10% LM.  100% diagonal 1.  30% diffuse circumflex.  30% diffuse RCA.  2011--EF 64%     8. Benign prostatic hypertrophy, 07/07/2008--negative biopsy.  PSA DIAGNOSTIC   9. Elevated PSA     10. Vitamin D deficiency  Vitamin D 25 Hydroxy    vitamin D3 125 MCG (5000 UT) capsule capsule   11. Generalized osteoarthritis of multiple sites     12. Gastroesophageal reflux disease without esophagitis     13. Splenomegaly     14. Microscopic hematuria  Urinalysis With Microscopic If Indicated (No Culture) - Urine, Clean Catch   15. 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  CBC (No Diff)   16. Memory loss  Ambulatory Referral to Neurology    17. History of 2019 novel coronavirus disease (COVID-19)  SARS-CoV-2 Antibodies ([facility])   18. Therapeutic drug monitoring     19. Colon cancer screening  Cologuard - Stool, Per Rectum     The subsequent Medicare wellness visit is documented on separate note.    Patient has hyperlipidemia which is under excellent control which is very important given his coronary artery disease as well as carotid artery plaque.  His heart disease is stable.  He is up-to-date on his carotid Doppler study.  He has BPH and an elevated PSA.  He had a negative biopsy back in 2008.  This seems stable and we will monitor.  Vitamin D is low and he needs vitamin D supplementation.  He has generalized osteoarthritis but the pain from this is not severe.  Esophageal reflux is not requiring medication.  He has splenomegaly and thrombocytopenia which is stable.  Microscopic hematuria has been evaluated in the past by urology.  Patient has complaints of memory loss which is not severely progressing.  He has a history of COVID-19 infection last November without residual.    Plan is as follows: No change in current medical regimen except encourage patient to take vitamin D 5000 units/day.  Recommend Covid-19 booster.  Cologuard ordered.  Patient will follow-up in 1 year with lab prior or follow-up as needed.    Procedures

## 2022-03-25 NOTE — PROGRESS NOTES
The ABCs of the Annual Wellness Visit  Subsequent Medicare Wellness Visit    No chief complaint on file.     Subjective    History of Present Illness:  Sergey Mendoza is a 81 y.o. male who presents for a Subsequent Medicare Wellness Visit.    The following portions of the patient's history were reviewed and   updated as appropriate: allergies, current medications, past family history, past medical history, past social history, past surgical history and problem list.    Compared to one year ago, the patient feels his physical   health is the same.    Compared to one year ago, the patient feels his mental   health is worse.    Recent Hospitalizations:  He was not admitted to the hospital during the last year.       Current Medical Providers:  Patient Care Team:  Ramesh Blake MD as PCP - General (Internal Medicine)    Outpatient Medications Prior to Visit   Medication Sig Dispense Refill   • aspirin 81 MG tablet Take 81 mg by mouth Daily.     • Coenzyme Q10 (COQ10) 400 MG capsule Take 1 capsule by mouth daily. Take with a meal.     • triamcinolone (KENALOG) 0.1 % cream Apply  topically to the appropriate area as directed 3 (Three) Times a Day. As needed rash/itching 60 g 0   • Cholecalciferol (VITAMIN D3) 125 MCG (5000 UT) capsule capsule 1 by mouth daily as directed 30 capsule    • HYDROcod Polst-CPM Polst ER (Tussionex Pennkinetic ER) 10-8 MG/5ML ER suspension Take 1 teaspoon p.o. every 12 hours for cough and congestion. 180 mL 0   • metoprolol succinate XL (TOPROL-XL) 50 MG 24 hr tablet Take 1 p.o. daily for blood pressure and heart 90 tablet 3   • predniSONE (DELTASONE) 10 MG tablet 5 by mouth daily 7 days, then 4 daily 2 days, 3 daily 2 days, 2 daily 2 days, 1 daily 2 days, one half daily 2 days and discontinue. 56 tablet 0   • rosuvastatin (CRESTOR) 20 MG tablet Take 1 p.o. daily for high cholesterol 90 tablet 3     No facility-administered medications prior to visit.       No opioid medication identified on  active medication list. I have reviewed chart for other potential  high risk medication/s and harmful drug interactions in the elderly.          Aspirin is on active medication list. Aspirin use is indicated based on review of current medical condition/s. Pros and cons of this therapy have been discussed today. Benefits of this medication outweigh potential harm.  Patient has been encouraged to continue taking this medication.  .      Patient Active Problem List   Diagnosis   • Benign essential hypertension   • Benign prostatic hypertrophy, 07/07/2008--negative biopsy.   • Carotid artery plaque, 4/26/2021--mild bilateral.  09/10/2018--mild bilateral. 08/11/2016--mild bilateral carotid artery plaque.   • Coronary artery disease involving native coronary artery of native heart without angina pectoris   • Diverticulosis of colon   • Gastroesophageal reflux disease without esophagitis   • Generalized osteoarthritis of multiple sites   • Hyperlipidemia   • History of myocardial infarction, 10/08/2010--patient presented with an anterior myocardial infarction.   • Primary osteoarthritis of right knee   • History of PTCA, 10/08/2010--anterior MI.  EF 40%.  PTCA with drug-eluting stent proximal LAD.  10% LM.  100% diagonal 1.  30% diffuse circumflex.  30% diffuse RCA.  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   • History of 2019 novel coronavirus disease (COVID-19)     Advance Care Planning  Advance Directive is not on file.  ACP discussion was held with the patient during this visit. Patient has an advance directive (not in EMR), copy requested.    Review of Systems   Constitutional: Negative.    HENT: Negative.    Eyes: Negative.    Respiratory: Negative.    Cardiovascular:  "Negative.    Gastrointestinal: Negative.    Endocrine: Negative.    Genitourinary: Negative.    Musculoskeletal: Positive for arthralgias.   Skin: Negative.    Allergic/Immunologic: Negative.    Neurological: Negative.    Hematological: Negative.    Psychiatric/Behavioral: Negative.         Objective    Vitals:    03/25/22 0727   BP: 128/70   Pulse: 72   Resp: 18   SpO2: 99%   Weight: 72.8 kg (160 lb 9.6 oz)   Height: 175.3 cm (69\")   PainSc:   2     BMI Readings from Last 1 Encounters:   03/25/22 23.72 kg/m²   BMI is within normal parameters. No follow-up required.    Does the patient have evidence of cognitive impairment? Yes    Physical Exam     General: Alert and oriented x 3.  No acute distress.  Normal affect.  HEENT: Pupils equal, round, reactive to light; extraocular movements intact; sclerae nonicteric; pharynx, ear canals and TMs normal.  Neck: Without JVD, thyromegaly, bruit, or adenopathy.  Lungs: Clear to auscultation in all fields.  Heart: Regular rate and rhythm without murmur, rub, gallop, or click.  Abdomen: Soft, nontender, without hepatosplenomegaly or hernia.  Bowel sounds normal.  : Deferred.  Rectal: Deferred.  Extremities: Without clubbing, cyanosis, edema, or pulse deficit.  Neurologic: Intact without focal deficit.  Normal station and gait observed during ingress and egress from the examination room.  Skin: Without significant lesion.  Musculoskeletal: Unremarkable.    Lab Results   Component Value Date    CHLPL 110 03/18/2022    TRIG 117 03/18/2022            HEALTH RISK ASSESSMENT    Smoking Status:  Social History     Tobacco Use   Smoking Status Former Smoker   Smokeless Tobacco Never Used   Tobacco Comment    Stopped smoking in his early 20s.     Alcohol Consumption:  Social History     Substance and Sexual Activity   Alcohol Use Yes   • Alcohol/week: 1.0 standard drink   • Types: 1 Glasses of wine per week    Comment: Socially     Fall Risk Screen:    INDIRA Fall Risk Assessment has " not been completed.    Depression Screening:  PHQ-2/PHQ-9 Depression Screening 3/25/2022   Retired Total Score -   Little Interest or Pleasure in Doing Things 0-->not at all   Feeling Down, Depressed or Hopeless 0-->not at all   PHQ-9: Brief Depression Severity Measure Score 0       Health Habits and Functional and Cognitive Screening:  Functional & Cognitive Status 3/24/2021   Do you have difficulty preparing food and eating? No   Do you have difficulty bathing yourself, getting dressed or grooming yourself? No   Do you have difficulty using the toilet? No   Do you have difficulty moving around from place to place? No   Do you have trouble with steps or getting out of a bed or a chair? No   Current Diet Well Balanced Diet   Dental Exam Up to date   Eye Exam Up to date   Exercise (times per week) 3 times per week   Current Exercise Activities Include Walking   Do you need help using the phone?  No   Are you deaf or do you have serious difficulty hearing?  No   Do you need help with transportation? No   Do you need help shopping? No   Do you need help preparing meals?  No   Do you need help with housework?  No   Do you need help with laundry? No   Do you need help taking your medications? No   Do you need help managing money? No   Do you ever drive or ride in a car without wearing a seat belt? No   Have you felt unusual stress, anger or loneliness in the last month? No   Who do you live with? Spouse   If you need help, do you have trouble finding someone available to you? No   Have you been bothered in the last four weeks by sexual problems? No   Do you have difficulty concentrating, remembering or making decisions? Yes       Age-appropriate Screening Schedule:  Refer to the list below for future screening recommendations based on patient's age, sex and/or medical conditions. Orders for these recommended tests are listed in the plan section. The patient has been provided with a written plan.    Health Maintenance    Topic Date Due   • LIPID PANEL  03/18/2023   • TDAP/TD VACCINES (2 - Td or Tdap) 02/13/2027   • INFLUENZA VACCINE  Discontinued   • ZOSTER VACCINE  Discontinued              Assessment/Plan   CMS Preventative Services Quick Reference  Risk Factors Identified During Encounter  Cardiovascular Disease  Dementia/Memory   Immunizations Discussed/Encouraged (specific Immunizations; COVID19  The above risks/problems have been discussed with the patient.  Follow up actions/plans if indicated are seen below in the Assessment/Plan Section.  Pertinent information has been shared with the patient in the After Visit Summary.    Diagnoses and all orders for this visit:    1. Encounter for subsequent annual wellness visit (AWV) in Medicare patient (Primary)    2. Hyperlipidemia  -     CK; Future  -     Comprehensive Metabolic Panel; Future  -     NMR LipoProfile; Future  -     TSH; Future  -     T4, Free; Future  -     T3, Free; Future  -     Discontinue: rosuvastatin (CRESTOR) 20 MG tablet; Take 1 p.o. daily for high cholesterol  Dispense: 90 tablet; Refill: 3  -     Discontinue: rosuvastatin (CRESTOR) 20 MG tablet; Take 1 p.o. daily for high cholesterol  Dispense: 90 tablet; Refill: 3  -     Discontinue: rosuvastatin (CRESTOR) 20 MG tablet; Take 1 p.o. daily for high cholesterol  Dispense: 90 tablet; Refill: 3  -     rosuvastatin (CRESTOR) 20 MG tablet; Take 1 p.o. daily for high cholesterol  Dispense: 90 tablet; Refill: 3    3. Benign essential hypertension  -     Discontinue: metoprolol succinate XL (TOPROL-XL) 50 MG 24 hr tablet; Take 1 p.o. daily for blood pressure and heart  Dispense: 90 tablet; Refill: 3  -     Discontinue: metoprolol succinate XL (TOPROL-XL) 50 MG 24 hr tablet; Take 1 p.o. daily for blood pressure and heart  Dispense: 90 tablet; Refill: 3  -     Discontinue: metoprolol succinate XL (TOPROL-XL) 50 MG 24 hr tablet; Take 1 p.o. daily for blood pressure and heart  Dispense: 90 tablet; Refill: 3  -      metoprolol succinate XL (TOPROL-XL) 50 MG 24 hr tablet; Take 1 p.o. daily for blood pressure and heart  Dispense: 90 tablet; Refill: 3    4. Carotid artery plaque, 09/10/2018--mild bilateral. 08/11/2016--mild bilateral carotid artery plaque.    5. Coronary artery disease involving native coronary artery of native heart without angina pectoris  -     Discontinue: metoprolol succinate XL (TOPROL-XL) 50 MG 24 hr tablet; Take 1 p.o. daily for blood pressure and heart  Dispense: 90 tablet; Refill: 3  -     Discontinue: metoprolol succinate XL (TOPROL-XL) 50 MG 24 hr tablet; Take 1 p.o. daily for blood pressure and heart  Dispense: 90 tablet; Refill: 3  -     Discontinue: metoprolol succinate XL (TOPROL-XL) 50 MG 24 hr tablet; Take 1 p.o. daily for blood pressure and heart  Dispense: 90 tablet; Refill: 3  -     metoprolol succinate XL (TOPROL-XL) 50 MG 24 hr tablet; Take 1 p.o. daily for blood pressure and heart  Dispense: 90 tablet; Refill: 3    6. History of myocardial infarction, 10/08/2010--patient presented with an anterior myocardial infarction.    7. History of PTCA, 10/08/2010--anterior MI.  EF 40%.  PTCA with drug-eluting stent proximal LAD.  10% LM.  100% diagonal 1.  30% diffuse circumflex.  30% diffuse RCA.  2011--EF 64%    8. Benign prostatic hypertrophy, 07/07/2008--negative biopsy.  -     PSA DIAGNOSTIC; Future    9. Elevated PSA    10. Vitamin D deficiency  -     Vitamin D 25 Hydroxy; Future  -     vitamin D3 125 MCG (5000 UT) capsule capsule; 1 by mouth daily as directed  Dispense: 30 capsule    11. Generalized osteoarthritis of multiple sites    12. Gastroesophageal reflux disease without esophagitis    13. Splenomegaly    14. Microscopic hematuria  -     Urinalysis With Microscopic If Indicated (No Culture) - Urine, Clean Catch; Future    15. 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  -     CBC (No Diff); Future    16. Memory loss  -     Ambulatory Referral to Neurology    17. History of 2019  novel coronavirus disease (COVID-19)  -     SARS-CoV-2 Antibodies (Roche); Future    18. Therapeutic drug monitoring    19. Colon cancer screening  -     Cologuard - Stool, Per Rectum; Future        Follow Up:   Return in about 1 year (around 3/25/2023) for Next scheduled follow up with lab prior.     An After Visit Summary and PPPS were made available to the patient.

## 2022-04-28 DIAGNOSIS — R19.5 POSITIVE COLORECTAL CANCER SCREENING USING COLOGUARD TEST: Primary | ICD-10-CM

## 2022-04-29 ENCOUNTER — TELEPHONE (OUTPATIENT)
Dept: INTERNAL MEDICINE | Facility: CLINIC | Age: 82
End: 2022-04-29

## 2022-04-29 NOTE — TELEPHONE ENCOUNTER
NORIS FROM [employer] IS CHECKING IF WE GOT HER FAX YESTERDAY OF PATIENTS ABNORMAL COLOGUARD TEST RESULTS. IF WE DID NOT RECEIVE IT GIVE THEM A CALL BACK ASAP     [phone]

## 2022-05-03 ENCOUNTER — OFFICE VISIT (OUTPATIENT)
Dept: SURGERY | Facility: CLINIC | Age: 82
End: 2022-05-03

## 2022-05-03 VITALS — BODY MASS INDEX: 24.14 KG/M2 | WEIGHT: 163 LBS | HEIGHT: 69 IN

## 2022-05-03 DIAGNOSIS — R19.5 POSITIVE COLORECTAL CANCER SCREENING USING DNA-BASED STOOL TEST: Primary | ICD-10-CM

## 2022-05-03 PROCEDURE — 99203 OFFICE O/P NEW LOW 30 MIN: CPT | Performed by: PHYSICIAN ASSISTANT

## 2022-05-03 RX ORDER — ROSUVASTATIN CALCIUM 20 MG/1
TABLET, COATED ORAL
COMMUNITY
Start: 2022-04-03 | End: 2022-05-03

## 2022-05-03 NOTE — PROGRESS NOTES
"CC:    Positive Cologuard test    HPI:    This is an 82 y/o gentleman presenting to the office today at the request of Dr. Ramesh Blake for a recent positive Cologuard test.  He is asymptomatic, denying abdominal pain, distention, changes to their bowel habits, no dark tarry stools, no bright red blood with her bowel movements, no unexpected weight loss or any other complaints.    PMH:    Reviewed and reconciled in Epic, significant for htn, CAD, Hx MI, Thrombocytopenia, GERD, BPH    PAST ABDOMINAL SURGERIES:  Colonoscopy 2012 no hx polyps, EGD 2012    PAST RELEVANT  SURGERIES:  Coronary angioplasty with stent placement, cardiac catheterization    SH:  Does not smoke, does consume alcohol    FMH:  No family history of colon cancer    ALLERGIES:   PCN  Sulfa    MEDICATIONS:  Reviewed and reconciled in Epic.    ROS:    All other systems reviewed and negative other than presenting complaints.    PE:  Vitals: Wt 163 Ht 69\" BMI 24  Constitutional: Well nourished well developed male in no acute distress  HEENT: Normocephalic, atraumatic, sclera anicteric, normal conjunctiva  Pulm: CTAB, normal respiratory effort, no wheezes rales or rhonchi noted  Cardiac: RRR, no murmurs rubs or gallops noted  Abdomen: soft, nontender, nondistended, normal bowel sounds noted    CLINICAL SUMMARY (A/P):    This is an 81 year old gentleman presenting to the office today with a positive Cologuard history.  He is asymptomatic.  With this recent positive test I am recommending proceeding with a colonoscopy for further diagnostic purposes.  Risks and rationale associated with it were discussed with him with risk include but not limited to bleeding, infection, bowel perforation and the need for additional procedures.  Any additional follow-up we discussed with him once the results have been reviewed.  All questions were answered and he was willing to proceed with all recommendations.      Dylan Baltazar PA-C    "

## 2022-05-26 DIAGNOSIS — I25.10 CORONARY ARTERY DISEASE INVOLVING NATIVE CORONARY ARTERY OF NATIVE HEART WITHOUT ANGINA PECTORIS: Chronic | ICD-10-CM

## 2022-05-26 DIAGNOSIS — E78.2 MIXED HYPERLIPIDEMIA: Chronic | ICD-10-CM

## 2022-05-26 DIAGNOSIS — I10 BENIGN ESSENTIAL HYPERTENSION: Chronic | ICD-10-CM

## 2022-05-26 RX ORDER — METOPROLOL SUCCINATE 50 MG/1
TABLET, EXTENDED RELEASE ORAL
Qty: 90 TABLET | Refills: 3
Start: 2022-05-26 | End: 2022-06-10 | Stop reason: SDUPTHER

## 2022-05-26 RX ORDER — ROSUVASTATIN CALCIUM 20 MG/1
TABLET, COATED ORAL
Qty: 90 TABLET | Refills: 3
Start: 2022-05-26 | End: 2022-06-10 | Stop reason: SDUPTHER

## 2022-05-26 NOTE — TELEPHONE ENCOUNTER
PATIENT REQUESTED A REFILL ON:rosuvastatin (CRESTOR) 20 MG tablet  metoprolol succinate XL (TOPROL-XL) 50 MG 24 hr tablet    PATIENT CAN BE REACHED ON:[phone]     PHARMACY [facility] - [facility] [address] - [phone]  - [phone] FX  [phone]

## 2022-05-30 ENCOUNTER — ANESTHESIA EVENT (OUTPATIENT)
Dept: GASTROENTEROLOGY | Facility: HOSPITAL | Age: 82
End: 2022-05-30

## 2022-05-31 ENCOUNTER — HOSPITAL ENCOUNTER (OUTPATIENT)
Facility: HOSPITAL | Age: 82
Setting detail: HOSPITAL OUTPATIENT SURGERY
Discharge: HOME | End: 2022-05-31
Attending: SURGERY | Admitting: SURGERY

## 2022-05-31 ENCOUNTER — ANESTHESIA (OUTPATIENT)
Dept: GASTROENTEROLOGY | Facility: HOSPITAL | Age: 82
End: 2022-05-31

## 2022-05-31 VITALS
DIASTOLIC BLOOD PRESSURE: 84 MMHG | HEIGHT: 69 IN | SYSTOLIC BLOOD PRESSURE: 139 MMHG | RESPIRATION RATE: 16 BRPM | HEART RATE: 75 BPM | BODY MASS INDEX: 23.25 KG/M2 | WEIGHT: 157 LBS | OXYGEN SATURATION: 99 %

## 2022-05-31 DIAGNOSIS — R19.5 POSITIVE COLORECTAL CANCER SCREENING USING DNA-BASED STOOL TEST: ICD-10-CM

## 2022-05-31 PROBLEM — K63.5 COLON POLYPS: Status: ACTIVE | Noted: 2022-05-31

## 2022-05-31 PROCEDURE — 88305 TISSUE EXAM BY PATHOLOGIST: CPT | Performed by: SURGERY

## 2022-05-31 PROCEDURE — 25010000002 PROPOFOL 10 MG/ML EMULSION

## 2022-05-31 RX ORDER — LIDOCAINE HYDROCHLORIDE 20 MG/ML
INJECTION, SOLUTION INFILTRATION; PERINEURAL AS NEEDED
Status: DISCONTINUED | OUTPATIENT
Start: 2022-05-31 | End: 2022-05-31 | Stop reason: SURG

## 2022-05-31 RX ORDER — SODIUM CHLORIDE, SODIUM LACTATE, POTASSIUM CHLORIDE, CALCIUM CHLORIDE 600; 310; 30; 20 MG/100ML; MG/100ML; MG/100ML; MG/100ML
30 INJECTION, SOLUTION INTRAVENOUS CONTINUOUS PRN
Status: DISCONTINUED | OUTPATIENT
Start: 2022-05-31 | End: 2022-05-31 | Stop reason: HOSPADM

## 2022-05-31 RX ORDER — PROMETHAZINE HYDROCHLORIDE 25 MG/1
25 SUPPOSITORY RECTAL ONCE AS NEEDED
Status: DISCONTINUED | OUTPATIENT
Start: 2022-05-31 | End: 2022-05-31 | Stop reason: HOSPADM

## 2022-05-31 RX ORDER — GLYCOPYRROLATE 0.2 MG/ML
INJECTION INTRAMUSCULAR; INTRAVENOUS AS NEEDED
Status: DISCONTINUED | OUTPATIENT
Start: 2022-05-31 | End: 2022-05-31 | Stop reason: SURG

## 2022-05-31 RX ORDER — PROMETHAZINE HYDROCHLORIDE 25 MG/1
25 TABLET ORAL ONCE AS NEEDED
Status: DISCONTINUED | OUTPATIENT
Start: 2022-05-31 | End: 2022-05-31 | Stop reason: HOSPADM

## 2022-05-31 RX ORDER — PROPOFOL 10 MG/ML
VIAL (ML) INTRAVENOUS AS NEEDED
Status: DISCONTINUED | OUTPATIENT
Start: 2022-05-31 | End: 2022-05-31 | Stop reason: SURG

## 2022-05-31 RX ADMIN — GLYCOPYRROLATE 0.2 MG: 0.2 INJECTION INTRAMUSCULAR; INTRAVENOUS at 08:03

## 2022-05-31 RX ADMIN — SODIUM CHLORIDE, POTASSIUM CHLORIDE, SODIUM LACTATE AND CALCIUM CHLORIDE 30 ML/HR: 600; 310; 30; 20 INJECTION, SOLUTION INTRAVENOUS at 07:27

## 2022-05-31 RX ADMIN — LIDOCAINE HYDROCHLORIDE 60 MG: 20 INJECTION, SOLUTION INFILTRATION; PERINEURAL at 08:03

## 2022-05-31 RX ADMIN — PROPOFOL 160 MCG/KG/MIN: 10 INJECTION, EMULSION INTRAVENOUS at 08:08

## 2022-05-31 RX ADMIN — PROPOFOL 50 MG: 10 INJECTION, EMULSION INTRAVENOUS at 08:08

## 2022-05-31 NOTE — ANESTHESIA PREPROCEDURE EVALUATION
Anesthesia Evaluation     Patient summary reviewed and Nursing notes reviewed   NPO Solid Status: > 8 hours  NPO Liquid Status: > 2 hours           Airway   Mallampati: II  Dental - normal exam         Pulmonary - negative pulmonary ROS and normal exam   Cardiovascular - normal exam    ECG reviewed    (+) hypertension, past MI , CAD, cardiac stents PVD, hyperlipidemia,  carotid artery disease      Neuro/Psych- negative ROS  GI/Hepatic/Renal/Endo    (+)  GERD,      Musculoskeletal     Abdominal    Substance History - negative use     OB/GYN          Other   arthritis,                    Anesthesia Plan    ASA 3     MAC       Anesthetic plan, all risks, benefits, and alternatives have been provided, discussed and informed consent has been obtained with: patient.        CODE STATUS:

## 2022-05-31 NOTE — ANESTHESIA POSTPROCEDURE EVALUATION
"Patient: Sergey Mendoza    Procedure Summary     Date: 05/31/22 Room / Location:  [facility]    Anesthesia Start: 0759 Anesthesia Stop: 0831    Procedure: COLONOSCOPY to cecum into TI with hot snare polypectomies (N/A ) Diagnosis:       Positive colorectal cancer screening using DNA-based stool test      (Positive colorectal cancer screening using DNA-based stool test [R19.5])    Surgeons: Raj Lopez MD Provider: Lenka Hugo MD    Anesthesia Type: MAC ASA Status: 3          Anesthesia Type: MAC    Vitals  Vitals Value Taken Time   /84 05/31/22 0850   Temp     Pulse 75 05/31/22 0850   Resp 16 05/31/22 0850   SpO2 99 % 05/31/22 0850           Post Anesthesia Care and Evaluation    Patient location during evaluation: PHASE II  Patient participation: complete - patient participated  Level of consciousness: awake  Pain management: adequate  Airway patency: patent  Anesthetic complications: No anesthetic complications    Cardiovascular status: acceptable  Respiratory status: acceptable  Hydration status: acceptable    Comments: /84 (BP Location: Left arm, Patient Position: Lying)   Pulse 75   Resp 16   Ht 175.3 cm (69\")   Wt 71.2 kg (157 lb)   SpO2 99%   BMI 23.18 kg/m²       "

## 2022-06-01 LAB
LAB AP CASE REPORT: NORMAL
PATH REPORT.FINAL DX SPEC: NORMAL
PATH REPORT.GROSS SPEC: NORMAL

## 2022-06-03 ENCOUNTER — TELEPHONE (OUTPATIENT)
Dept: SURGERY | Facility: CLINIC | Age: 82
End: 2022-06-03

## 2022-06-03 NOTE — TELEPHONE ENCOUNTER
PT WOULD LIKE RESULTS OF C-SCOPE FROM 5/31/2022. PT SAID HE WILL BE AVAILABLE AFTER 4:00 PM TODAY    I called and left a message regarding his colonoscopy results.     Pathology report:   Final Diagnosis   1. Proximal Ascending Colon Polyps Biopsy:  A. Fragments of tubular adenoma.  B. No high-grade dysplasia nor malignancy identified.     2. Distal Descending Colon Polyp Biopsy:  A. Tubular adenoma.  B. No high-grade dysplasia nor malignancy identified.         I recommend that he undergoes colonoscopy in 5 years for surveillance.     He verbalized understanding and agreed    Raj Lopez MD  General, Minimally Invasive and Endoscopic Surgery  [facility]    [address], [address]  [place], [place], [address]  P: [phone]  F: [phone]

## 2022-06-07 ENCOUNTER — TELEPHONE (OUTPATIENT)
Dept: SURGERY | Facility: CLINIC | Age: 82
End: 2022-06-07

## 2022-06-10 DIAGNOSIS — E78.2 MIXED HYPERLIPIDEMIA: Chronic | ICD-10-CM

## 2022-06-10 DIAGNOSIS — I10 BENIGN ESSENTIAL HYPERTENSION: Chronic | ICD-10-CM

## 2022-06-10 DIAGNOSIS — I25.10 CORONARY ARTERY DISEASE INVOLVING NATIVE CORONARY ARTERY OF NATIVE HEART WITHOUT ANGINA PECTORIS: Chronic | ICD-10-CM

## 2022-06-10 RX ORDER — ROSUVASTATIN CALCIUM 20 MG/1
TABLET, COATED ORAL
Qty: 90 TABLET | Refills: 3 | Status: SHIPPED | OUTPATIENT
Start: 2022-06-10 | End: 2022-08-11 | Stop reason: SDUPTHER

## 2022-06-10 RX ORDER — METOPROLOL SUCCINATE 50 MG/1
TABLET, EXTENDED RELEASE ORAL
Qty: 90 TABLET | Refills: 3 | Status: SHIPPED | OUTPATIENT
Start: 2022-06-10 | End: 2022-08-11 | Stop reason: SDUPTHER

## 2022-06-10 NOTE — TELEPHONE ENCOUNTER
Caller: Sergey Mendoza    Relationship: Self    Best call back number:     Requested Prescriptions:   Requested Prescriptions     Pending Prescriptions Disp Refills   • metoprolol succinate XL (TOPROL-XL) 50 MG 24 hr tablet 90 tablet 3     Sig: Take 1 p.o. daily for blood pressure and heart   • rosuvastatin (CRESTOR) 20 MG tablet 90 tablet 3     Sig: Take 1 p.o. daily for high cholesterol        Pharmacy where request should be sent:  [facility]  [address]  [phone]    Additional details provided by patient:   Does the patient have less than a 3 day supply:  [] Yes  [x] No    Benjy King Rep   06/10/22 11:28 EDT

## 2022-07-28 ENCOUNTER — OFFICE VISIT (OUTPATIENT)
Dept: NEUROLOGY | Facility: CLINIC | Age: 82
End: 2022-07-28

## 2022-07-28 VITALS
OXYGEN SATURATION: 97 % | HEIGHT: 69 IN | HEART RATE: 57 BPM | BODY MASS INDEX: 24.14 KG/M2 | WEIGHT: 163 LBS | DIASTOLIC BLOOD PRESSURE: 78 MMHG | SYSTOLIC BLOOD PRESSURE: 140 MMHG

## 2022-07-28 DIAGNOSIS — R41.3 SHORT-TERM MEMORY LOSS: Primary | ICD-10-CM

## 2022-07-28 PROCEDURE — 99204 OFFICE O/P NEW MOD 45 MIN: CPT | Performed by: PSYCHIATRY & NEUROLOGY

## 2022-07-28 NOTE — PROGRESS NOTES
Chief Complaint   Patient presents with   • Memory Loss       Patient ID: Sergey Mendoza is a 82 y.o. male.    HPI: I have had the pleasure of seeing your patient today.  As you may know he is an 82-year-old gentleman here for the evaluation of memory loss.  Patient says that for the past couple of years he has noticed increasing difficulty with short-term memory.  He does teach a Bible study class and says that over time he has noted significant difficulty recalling information during his teachings.  He has been noted to forget about chores quite easily and then recall a few days later that the certain chores needs to be done.  He has had issues where he forgets his glasses at home when he leaves as well as his phone.  He has forgotten credit cards at restaurants on a few occasions.  The interesting part about that is when we review these items he does not recall doing them.  His wife says that lately she has noted he gets agitated easier.  He has been known to tell his wife the same thing more than once.  He does maintain the finances for the home and he feels that he is not as proficient in that process as he once was.  Patient denies any history of head injuries.  No family history of dementia.  He does acknowledge perhaps some issues with anxiety however not severe.  No history of focal weakness or numbness of his arms or legs.  No balance or discoordination issues.    The following portions of the patient's history were reviewed and updated as appropriate: allergies, current medications, past family history, past medical history, past social history, past surgical history and problem list.    Review of Systems   Constitutional: Negative for fatigue.   HENT: Negative for facial swelling, sinus pressure and sinus pain.    Eyes: Negative for visual disturbance.   Respiratory: Negative for chest tightness and shortness of breath.    Cardiovascular: Negative for chest pain, palpitations and leg swelling.    Gastrointestinal: Negative for nausea and vomiting.   Genitourinary: Positive for frequency. Negative for urgency.   Musculoskeletal: Positive for joint swelling. Negative for back pain, gait problem, neck pain and neck stiffness.   Neurological: Positive for speech difficulty. Negative for dizziness, tremors, seizures, syncope, weakness, light-headedness, numbness and headaches.   Hematological: Bruises/bleeds easily.   Psychiatric/Behavioral: Positive for agitation, behavioral problems, decreased concentration and dysphoric mood. Negative for confusion, hallucinations, self-injury, sleep disturbance and suicidal ideas. The patient is not nervous/anxious.       I have reviewed the review of systems above performed by my medical assistant.      Vitals:    07/28/22 1058   BP: 140/78   Pulse: 57   SpO2: 97%       Neurologic Exam     Mental Status   Oriented to person, place, and time.   Registration: recalls 3 of 3 objects. Follows 3 step commands.   Attention: normal. Concentration: normal.   Speech: speech is normal   Level of consciousness: alert  Knowledge: consistent with education (No deficits found.).   Normal comprehension.     Cranial Nerves     CN II   Visual fields full to confrontation.     CN III, IV, VI   Pupils are equal, round, and reactive to light.  Extraocular motions are normal.   CN III: no CN III palsy  CN VI: no CN VI palsy  Nystagmus: none   Diplopia: none    CN V   Facial sensation intact.     CN VII   Facial expression full, symmetric.     CN VIII   CN VIII normal.     CN IX, X   CN IX normal.   CN X normal.     CN XI   CN XI normal.     CN XII   CN XII normal.     Motor Exam   Muscle bulk: normal  Right arm tone: normal  Left arm tone: normal  Right leg tone: normal  Left leg tone: normal    Strength   Right neck flexion: 5/5  Left neck flexion: 5/5  Right neck extension: 5/5  Left neck extension: 5/5  Right deltoid: 5/5  Left deltoid: 5/5  Right biceps: 5/5  Left biceps: 5/5  Right  triceps: 5/5  Left triceps: 5/5  Right wrist flexion: 5/5  Left wrist flexion: 5/5  Right wrist extension: 5/5  Left wrist extension: 5/5  Right interossei: 5/5  Left interossei: 5/5  Right abdominals: 5/5  Left abdominals: 5/5  Right iliopsoas: 5/5  Left iliopsoas: 5/5  Right quadriceps: 5/5  Left quadriceps: 5/5  Right hamstrin/5  Left hamstrin/5  Right glutei: 5/5  Left glutei: 5/5  Right anterior tibial: 5/5  Left anterior tibial: 5/5  Right posterior tibial: 5/5  Left posterior tibial: 5/5  Right peroneal: 5/5  Left peroneal: 5/5  Right gastroc: 5/5  Left gastroc: 5/5    Sensory Exam   Light touch normal.   Vibration normal.   Proprioception normal.   Pinprick normal.     Gait, Coordination, and Reflexes     Gait  Gait: normal    Coordination   Romberg: negative    Tremor   Resting tremor: absent  Intention tremor: absent    Reflexes   Right brachioradialis: 2+  Left brachioradialis: 2+  Right biceps: 2+  Left biceps: 2+  Right triceps: 2+  Left triceps: 2+  Right patellar: 2+  Left patellar: 2+  Right achilles: 2+  Left achilles: 2+  Right : 2+  Left : 2+Station is normal.       Physical Exam  Vitals reviewed.   Constitutional:       General: He is not in acute distress.     Appearance: He is well-developed.   HENT:      Head: Normocephalic and atraumatic.   Eyes:      Extraocular Movements: EOM normal.      Pupils: Pupils are equal, round, and reactive to light.   Cardiovascular:      Rate and Rhythm: Normal rate and regular rhythm.      Heart sounds: Normal heart sounds.   Pulmonary:      Effort: Pulmonary effort is normal. No respiratory distress.      Breath sounds: Normal breath sounds.   Abdominal:      General: Bowel sounds are normal. There is no distension.      Palpations: Abdomen is soft.      Tenderness: There is no abdominal tenderness.   Musculoskeletal:         General: No deformity.      Cervical back: Normal range of motion.   Skin:     General: Skin is warm.      Findings: No  rash.   Neurological:      Mental Status: He is oriented to person, place, and time.      Coordination: Romberg Test normal.      Gait: Gait is intact.      Deep Tendon Reflexes:      Reflex Scores:       Tricep reflexes are 2+ on the right side and 2+ on the left side.       Bicep reflexes are 2+ on the right side and 2+ on the left side.       Brachioradialis reflexes are 2+ on the right side and 2+ on the left side.       Patellar reflexes are 2+ on the right side and 2+ on the left side.       Achilles reflexes are 2+ on the right side and 2+ on the left side.  Psychiatric:         Speech: Speech normal.         Judgment: Judgment normal.         Procedures    Assessment/Plan: We are going to move forward with neuropsych testing.  I would also like to schedule him for MRI of the brain both with contrast.  We will see him back here in clinic after testing.       Diagnoses and all orders for this visit:    1. Short-term memory loss (Primary)  -     Ambulatory Referral to Neuropsychology  -     MRI Brain With & Without Contrast; Future           Mario Ontiveros II, MD

## 2022-08-10 NOTE — PROGRESS NOTES
Subjective   Sergey Mendoza is a 78 y.o. male.     Chief Complaint   Patient presents with   • Nasal Congestion   • Cough         History of Present Illness     Patient notes that today's office visit that his wife has recently had a bout of pneumonia.  Patient notes that his daughter has walking pneumonia.  Patient notes that he has been coughing since Thanksgiving.  Patient states that he has some mucus that is present in his cough.  Denies any colored discharge.  Patient denies any fevers.  Patient states that he occasionally has some wheezing.    The following portions of the patient's history were reviewed and updated as appropriate: allergies, current medications, past family history, past medical history, past social history, past surgical history and problem list.    Review of Systems   Constitutional: Negative for chills and fever.   HENT: Positive for congestion. Negative for rhinorrhea, sinus pain and sore throat.    Eyes: Negative for photophobia and visual disturbance.   Respiratory: Positive for cough and wheezing. Negative for chest tightness and shortness of breath.    Cardiovascular: Negative for chest pain and palpitations.   Gastrointestinal: Negative for diarrhea, nausea and vomiting.   Genitourinary: Negative for dysuria, frequency and urgency.   Skin: Negative for rash and wound.   Neurological: Negative for dizziness and syncope.   Psychiatric/Behavioral: Negative for behavioral problems and confusion.       Objective   Physical Exam   Constitutional: He is oriented to person, place, and time. He appears well-developed and well-nourished.   HENT:   Head: Normocephalic and atraumatic.   Mouth/Throat: Oropharynx is clear and moist.   Eyes: EOM are normal.   Neck: Normal range of motion. Neck supple.   Cardiovascular: Normal rate, regular rhythm and normal heart sounds.   Pulmonary/Chest: Effort normal. No respiratory distress. He has wheezes.   Patient wheezes are mild.   Neurological: He is  alert and oriented to person, place, and time.   Psychiatric: He has a normal mood and affect. His behavior is normal.   Nursing note and vitals reviewed.      Assessment/Plan   Sergey was seen today for nasal congestion and cough.    Diagnoses and all orders for this visit:    Acute bronchitis, unspecified organism  -     azithromycin (ZITHROMAX) 250 MG tablet; Take 2 tablets the first day, then 1 tablet daily for 4 days.  -     albuterol 108 (90 Base) MCG/ACT inhaler; Inhale 2 puffs Every 4 (Four) Hours As Needed for Wheezing.  -     guaifenesin-codeine (GUAIFENESIN AC) 100-10 MG/5ML liquid; Take 5 mL by mouth 3 (Three) Times a Day As Needed for Cough.  -     umeclidinium-vilanterol (ANORO ELLIPTA) 62.5-25 MCG/INH aerosol powder  inhaler; Inhale 1 puff Daily.  -     If his signs and symptoms do not improve, patient may need x-rays and perhaps prednisone at that time.          No Follow-up on file.    Dictated utilizing Dragon Voice Recognition Software          negative...

## 2022-08-11 DIAGNOSIS — I10 BENIGN ESSENTIAL HYPERTENSION: Chronic | ICD-10-CM

## 2022-08-11 DIAGNOSIS — E78.2 MIXED HYPERLIPIDEMIA: Chronic | ICD-10-CM

## 2022-08-11 DIAGNOSIS — I25.10 CORONARY ARTERY DISEASE INVOLVING NATIVE CORONARY ARTERY OF NATIVE HEART WITHOUT ANGINA PECTORIS: Chronic | ICD-10-CM

## 2022-08-11 RX ORDER — ROSUVASTATIN CALCIUM 20 MG/1
TABLET, COATED ORAL
Qty: 90 TABLET | Refills: 3 | Status: SHIPPED | OUTPATIENT
Start: 2022-08-11

## 2022-08-11 RX ORDER — METOPROLOL SUCCINATE 50 MG/1
TABLET, EXTENDED RELEASE ORAL
Qty: 90 TABLET | Refills: 3 | Status: SHIPPED | OUTPATIENT
Start: 2022-08-11

## 2022-08-11 NOTE — TELEPHONE ENCOUNTER
Caller: Sergey Mendoza    Relationship: Self    Best call back number: [phone] (M)    Requested Prescriptions:   Requested Prescriptions     Pending Prescriptions Disp Refills   • metoprolol succinate XL (TOPROL-XL) 50 MG 24 hr tablet 90 tablet 3     Sig: Take 1 p.o. daily for blood pressure and heart   • rosuvastatin (CRESTOR) 20 MG tablet 90 tablet 3     Sig: Take 1 p.o. daily for high cholesterol        Pharmacy where request should be sent: [facility] ([facility]) - [place] [id number] - [phone]  - [phone] FX     Additional details provided by patient: NOT DUE FOR REFILLS, BUT WANTS THE SCRIPTS SENT TO [facility] SO THEY'LL BE READY FOR NEXT REFILL.     Does the patient have less than a 3 day supply:  [] Yes  [x] No    Benjy KAYE Rep   08/11/22 11:59 EDT

## 2022-08-23 ENCOUNTER — HOSPITAL ENCOUNTER (OUTPATIENT)
Dept: MRI IMAGING | Facility: HOSPITAL | Age: 82
Discharge: HOME | End: 2022-08-23
Admitting: PSYCHIATRY & NEUROLOGY

## 2022-08-23 DIAGNOSIS — R41.3 SHORT-TERM MEMORY LOSS: ICD-10-CM

## 2022-08-23 PROCEDURE — 82565 ASSAY OF CREATININE: CPT

## 2022-08-23 PROCEDURE — 0 GADOBENATE DIMEGLUMINE 529 MG/ML SOLUTION: Performed by: PSYCHIATRY & NEUROLOGY

## 2022-08-23 PROCEDURE — A9577 INJ MULTIHANCE: HCPCS | Performed by: PSYCHIATRY & NEUROLOGY

## 2022-08-23 PROCEDURE — 70553 MRI BRAIN STEM W/O & W/DYE: CPT

## 2022-08-23 RX ADMIN — GADOBENATE DIMEGLUMINE 15 ML: 529 INJECTION, SOLUTION INTRAVENOUS at 13:39

## 2022-09-20 LAB — CREAT BLDA-MCNC: 1.1 MG/DL (ref 0.6–1.3)

## 2022-10-05 ENCOUNTER — TELEPHONE (OUTPATIENT)
Dept: NEUROLOGY | Facility: CLINIC | Age: 82
End: 2022-10-05

## 2022-10-05 NOTE — TELEPHONE ENCOUNTER
Caller: Jes Mendoza    Relationship: Emergency Contact    Best call back number: [phone]    Caller requesting test results: WIFE, ON  VERBAL    What test was performed: MRI OF THE BRAIN    When was the test performed: 08/23/2022    Where was the test performed: [facility]    Additional notes: PATIENT'S WIFE IS ALSO WANTING TO KNOW IF DR. JEROME HAS SEEN THE NOTE FROM DR. DEJESUS WITH [facility].    DOES THE PATIENT NEED A FOLLOW UP APPOINTMENT?    PLEASE CALL AND ADVISE    THANK YOU

## 2022-10-17 ENCOUNTER — TELEPHONE (OUTPATIENT)
Dept: NEUROLOGY | Facility: CLINIC | Age: 82
End: 2022-10-17

## 2022-10-17 NOTE — TELEPHONE ENCOUNTER
Caller: Jes Mendoza     Relationship: Emergency Contact     Best call back number: [phone]     Caller requesting test results: WIFE, ON  VERBAL     What test was performed: MRI OF THE BRAIN     When was the test performed: 08/23/2022     Where was the test performed: [facility]     Additional notes: PATIENT'S WIFE IS  WANTING TO KNOW IF DR. JEROME HAS SEEN THE NOTE FROM DR. DEJESUS WITH [facility] AS THEY THINK THE PATIENT COULD BENEFIT FROM A MEDICATION SO SHE WOULD LIKE A CALL BACK WITH MRI RESULTS AS WELL AS TO DISCUSS FURTHER PLAN OF TREATMENT FOR THE PATIENT.

## 2022-12-09 ENCOUNTER — TELEPHONE (OUTPATIENT)
Dept: INTERNAL MEDICINE | Facility: CLINIC | Age: 82
End: 2022-12-09

## 2022-12-09 NOTE — TELEPHONE ENCOUNTER
Pt has seen a neurologist for this and was diagnosed by them for this condition. Pt's wife will need to contact that office on pt's meds if prescribed for this condition.

## 2022-12-09 NOTE — TELEPHONE ENCOUNTER
Caller: Jes Mendoza    Relationship: Emergency Contact    Best call back number:    [phone]   What medication are you requesting: MEDICATION TO HELP WITH MEMORY LOSS    If a prescription is needed, what is your preferred pharmacy and phone number: [id number] - [place], [place] - [address] [address] AT [address] [phone] [facility] [phone] FX     Additional notes: PATIENTS SPOUSE CALLING STATING THAT HE HAS COMPLETED HIS EVALUATION FOR MEMORY LOSS HE WAS DIAGNOSED WITH MILD COGNITIVE IMPAIRMENT,  UNSPECIFIED ANXIETY DISORDER THIS WAS COMPLETED IN SEPT SHE STATED NO ONE HAS CALLED TO DISCUSS ANY MEDICATION.

## 2023-01-13 ENCOUNTER — OFFICE VISIT (OUTPATIENT)
Dept: INTERNAL MEDICINE | Facility: CLINIC | Age: 83
End: 2023-01-13
Payer: MEDICARE

## 2023-01-13 VITALS
DIASTOLIC BLOOD PRESSURE: 72 MMHG | WEIGHT: 161.2 LBS | BODY MASS INDEX: 23.88 KG/M2 | RESPIRATION RATE: 18 BRPM | HEART RATE: 86 BPM | SYSTOLIC BLOOD PRESSURE: 128 MMHG | HEIGHT: 69 IN | OXYGEN SATURATION: 98 %

## 2023-01-13 DIAGNOSIS — B35.4 TINEA CORPORIS: ICD-10-CM

## 2023-01-13 DIAGNOSIS — G31.84 MILD COGNITIVE IMPAIRMENT WITH MEMORY LOSS: Primary | ICD-10-CM

## 2023-01-13 PROBLEM — Z86.010 HISTORY OF COLON POLYPS: Status: ACTIVE | Noted: 2022-05-31

## 2023-01-13 PROBLEM — Z86.0100 HISTORY OF COLON POLYPS: Status: ACTIVE | Noted: 2022-05-31

## 2023-01-13 PROCEDURE — 99214 OFFICE O/P EST MOD 30 MIN: CPT | Performed by: INTERNAL MEDICINE

## 2023-01-13 RX ORDER — DONEPEZIL HYDROCHLORIDE 5 MG/1
TABLET, FILM COATED ORAL
Qty: 90 TABLET | Refills: 3 | Status: SHIPPED | OUTPATIENT
Start: 2023-01-13 | End: 2023-02-21

## 2023-01-13 NOTE — PROGRESS NOTES
01/13/2023    Patient Information  Sergey Mendoza                                                                                          [address]  [facility] [address]      1940  [unfilled]  There is no work phone number on file.    Chief Complaint:     Here to discuss memory loss and initiate treatment.    History of Present Illness:    Patient who has diagnosed a while back with mild cognitive impairment with memory loss is here to discuss his neuropsychiatric evaluation and he would like to initiate medication.  Patient also has complaints of a pruritic rash as described below.  His past medical history reviewed and updated were necessary including health maintenance parameters.  This reveals he is up-to-date or else accounted for.    Review of Systems   Constitutional: Negative.   HENT: Negative.    Eyes: Negative.    Cardiovascular: Negative.    Respiratory: Negative.    Endocrine: Negative.    Hematologic/Lymphatic: Negative.    Skin: Positive for itching and rash.   Musculoskeletal: Negative.    Gastrointestinal: Negative.    Genitourinary: Negative.    Neurological: Negative.    Psychiatric/Behavioral: Positive for memory loss. Negative for altered mental status, depression, hallucinations, substance abuse, suicidal ideas and thoughts of violence.   Allergic/Immunologic: Negative.        Active Problems:    Patient Active Problem List   Diagnosis   • Benign essential hypertension   • Benign prostatic hypertrophy, 07/07/2008--negative biopsy.   • Carotid artery plaque, 4/26/2021--mild bilateral.  09/10/2018--mild bilateral. 08/11/2016--mild bilateral carotid artery plaque.   • Coronary artery disease involving native coronary artery of native heart without angina pectoris   • Diverticulosis of colon   • Gastroesophageal reflux disease without esophagitis   • Generalized osteoarthritis of multiple sites   • Hyperlipidemia   • History of myocardial infarction, 10/08/2010--patient  presented with an anterior myocardial infarction.   • Primary osteoarthritis of right knee   • History of PTCA, 10/08/2010--anterior MI.  EF 40%.  PTCA with drug-eluting stent proximal LAD.  10% LM.  100% diagonal 1.  30% diffuse circumflex.  30% diffuse RCA.  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   • History of 2019 novel coronavirus disease (COVID-19)   • History of colon polyps, 5/31/2022--tubular adenoma x2.   • Tinea corporis         Past Medical History:   Diagnosis Date   • Asymptomatic cholelithiasis 02/19/2018   • Benign essential hypertension 03/24/2006    Diagnosed approximately March 2006.   • Benign prostatic hypertrophy, 07/07/2008--negative biopsy. 07/07/2008    Patient had a history of elevated PSA and found to have benign prostatic hypertrophy.   07/07/2008--negative prostate biopsy.   • Carotid artery plaque, 4/26/2021--mild bilateral.  09/10/2018--mild bilateral. 08/11/2016--mild bilateral carotid artery plaque. 04/21/2009 April 26, 2021--carotid Doppler study reveals mild bilateral carotid plaque.  September 10, 2018--carotid Doppler study reveals mild bilateral carotid artery plaque.  08/11/2016--carotid Doppler study reveals mild bilateral carotid plaque.  05/28/2014--vascular screen reveals mild bilateral carotid plaque, negative for AAA, negative for PAD.   03/30/2011--vascular screen revealed mild bilateral ca   • Diverticulosis of colon 01/09/2012 01/09/2012--normal colonoscopy except for diffuse diverticulosis.   2004--colonoscopy normal.   • Elevated PSA 11/06/2013 02/13/2017--patient seen in follow-up and reports he is followed on a yearly basis by the urologist.  Last follow-up was in December 2016  and his PSA was less than 5.  07/28/2016--PSA elevated at 4.88.  03/19/2014--he was seen in followup and his PSA was back down to 3.9. Patient was not having any urinary symptoms.  12/11/2013--patient was evaluated by the urologist and he elected to just observe the PSA.   11/06/2013--patient seen in followup in his PSA returned even higher at 5.7. He was referred back to urology.   11/06/2013--PSA returned elevated at 4.3. Patient did have some urinary symptoms consisting of periods of weak stream and dribbling. He had never been treated for chronic prostatitis. I treated him with ciprofloxacin 500 mg for 30 days.       • Gastroesophageal reflux disease without esophagitis 03/24/2016   • Generalized osteoarthritis of multiple sites 03/24/2016   • History of 2019 novel coronavirus disease (COVID-19) 11/30/2021 November 24, 2021   • History of cardiac catheterization 10/08/2010    10/08/2010--patient presented with an anterior myocardial infarction. Heart catheterization revealed anterior/apical hypokinesis with 40% ejection fraction. Severe 80% ostial LAD stenosis. Mild 10% left main. Mild diffuse LAD. Totally occluded diagonal one. 30% diffuse circumflex. Mild 30% percent diffuse RCA. Angioplasty with drug eluding stent performed to the proximal LAD.    • History of Chronic Duodenitis 01/09/2012 01/09/2012--EGD revealed mild distal erythema of the stomach and proximal duodenum.   • History of closed Colles' fracture of left radius 05[birth year]05/1953    13 years of age.   • History of colon polyps, 5/31/2022--tubular adenoma x2. 5/31/2022    May 31, 2022--colonoscopy revealed tubular adenoma x2.  Proximal and ascending colon.   • History of iron deficiency anemia 06/23/2012 02/07/2017--hemoglobin normal at 14.2, hematocrit normal at 45.1.  Serum iron normal at 105.  Iron saturation normal at 30%.  Resolve this issue.  08/02/2016--patient seen in follow-up.  White count is low at 3.66.  Hemoglobin low at 13.3.   Hematocrit normal at 41.8.  Platelets low at 109.  Homocystine and methylmalonic acid are normal.  Serum iron normal at 98.  Iron saturation normal at 28.  Patient is taking iron sulfate 325 mg daily.  10/13/2013--CBC was entirely normal, anemia resolved we'll continue to monitor.  06/23/2012--patient treated for iron deficiency anemia caused by duodenitis which was revealed per EGD 01/09/2012.    • History of myocardial infarction, 10/08/2010--patient presented with an anterior myocardial infarction. 10/08/2010    Stress test 02/23/2011--mild anterior wall ischemia, ejection fraction improved to 64%.  10/08/2010--patient presented with an anterior myocardial infarction. Heart catheterization revealed anterior/apical hypokinesis with 40% ejection fraction. Severe 80% ostial LAD stenosis. Mild 10% left main. Mild diffuse LAD. Totally occluded diagonal one. 30% diffuse circumflex. Mild 30% percent diffuse RCA. Angioplasty with drug eluding stent performed to the proximal LAD.    • History of PTCA, 10/08/2010--anterior MI.  EF 40%.  PTCA with drug-eluting stent proximal LAD.  10% LM.  100% diagonal 1.  30% diffuse circumflex.  30% diffuse RCA.  2011--EF 64% 10/08/2010    Stress test 02/23/2011--mild anterior wall ischemia, ejection fraction improved to 64%.  10/08/2010--patient presented with an anterior myocardial infarction. Heart catheterization revealed anterior/apical hypokinesis with 40% ejection fraction. Severe 80% ostial LAD stenosis. Mild 10% left main. Mild diffuse LAD. Totally occluded diagonal one. 30% diffuse circumflex. Mild 30% percent diffuse RCA. Angioplasty with drug eluding stent performed to the proximal LAD.    • History of Tear of medial meniscus of right knee 10/17/2014    12/09/2014--patient was evaluated by the orthopedist. He felt that patient also had end stage osteoarthritis the right knee and will eventually need a total knee replacement. In the meantime he gave a cortisone injection in  the knee and patient reports this did help. Patient also went to physical therapy which seemed to help somewhat also.   10/29/2014--MRI of the right knee reveals tricompartmental osteoarthritis that is severe in the medial compartment. There is a degenerative tear of the medial meniscus. There is a joint effusion with Baker's cyst. Intramuscular extension of the Baker's cyst into the medial head of the gastrocnemius muscle. Patient referred to orthopedics.   10/17/2014--x-ray of the right knee reveals a small suprapatellar effusion. Narrowing of the medial compartment. There is patellar spur formation. No fracture, dislocation, bone lesion is demonstrated.   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 He was trying to sit down and was in an awkward position.    • Hyperlipidemia 03/06/2011 03/16/2011--treatment for hyperlipidemia and initiated.   • Internal hemorrhoids 03/24/2016   • Microscopic hematuria 02/19/2018 12/20/2017--patient was evaluated by the urologist for a routine follow-up elevated PSA.  He was noted to have microscopic hematuria with 3-5 RBCs.  Cystoscopy was reportedly negative.  Patient also had a CT scan of the abdomen and pelvis which revealed asymptomatic cholelithiasis, diffuse atherosclerosis, and enlarged prostate.   • Multiple actinic keratoses 08/02/2016 08/02/2016--patient presents with multiple actinic keratoses as well as seborrheic keratoses.  Dermatology referral given.   • Occlusive coronary artery disease, 10/08/2010--anterior MI.  EF 40%.  PTCA with drug-eluting stent proximal LAD.  10% LM.  100% diagonal 1.  30% diffuse circumflex.  30% diffuse RCA. 10/08/2010    02/23/2011--stress Cardiolite reveals mild anterior wall ischemia, ejection fraction improved to 64%.  10/08/2010--patient presented with an anterior myocardial infarction. Heart catheterization revealed anterior/apical hypokinesis with 40% ejection fraction. Severe 80% ostial LAD  stenosis. Mild 10% left main. Mild diffuse LAD. Totally occluded diagonal one. 30% diffuse circumflex. Mild 30% percent diffuse RCA. Angioplasty with drug eluding stent performed to the proximal LAD.   10/08/2010--anterior MI.  EF 40%.  PTCA with drug-eluting stent proximal LAD.  10% LM.  100% diagonal 1.  30% diffuse circumflex.  30% diffuse RCA.   • Primary osteoarthritis of right knee 10/17/2014    12/09/2014--patient was evaluated by the orthopedist. He felt that patient also had end stage osteoarthritis the right knee and will eventually need a total knee replacement. In the meantime he gave a cortisone injection in the knee and patient reports this did help. Patient also went to physical therapy which seemed to help somewhat also.  10/29/2014--MRI of the right knee reveals tricompartmental osteoarthritis that is severe in the medial compartment. There is a degenerative tear of the medial meniscus. There is a joint effusion with Baker's cyst. Intramuscular extension of the Baker's cyst into the medial head of the gastrocnemius muscle. Patient referred to orthopedics.   10/17/2014--x-ray of the right knee reveals a small suprapatellar effusion. Narrowing of the medial compartment. There is patellar spur formation. No fracture, dislocation, bone lesion is demonstrated.   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 He was trying to sit down and was in an awkward position. T   • Splenomegaly 08/02/2016 09/06/2013--CT scan reveals mild splenomegaly.  Although not in the report, the radiologist did compare the CT scan from 09/06/2013 to the one from 2010.  The spleen actually looks smaller in size, 13.5 cm compared to 15 cm previously.  In another direction, spleen is 20 cm as compared to 24 cm.  The last dimension was the maximum length.  08/04/2010--initial evaluation by the hematologist. WBCs 3.5. Differential normal. Absolute neutrophil count 2500. Hemoglobin normal at 14.2. MCV low at  83. Platelet count low at 125. IPF normal at 4.6%. Review of old labs through 1997 revealed a white blood cell count has fluctuated between 2.8 and 3.9. There is no trend downward but it is fluctuating. Platelet count has ranged between 148 and 198. MCV has been low. Liver spleen scan revealed mild splenomegaly. B12, folic acid, iron studies, rheumatoid factor, SPEP have all been normal. Felt to be mild pancytopenia secondary to splenomegaly. Patient is followed on a regular basis by the hematologist.   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 03/24/2021   • Vitamin D deficiency 03/24/2016         Past Surgical History:   Procedure Laterality Date   • CARDIAC CATHETERIZATION  10/08/2010    See past medical history   • COLONOSCOPY  01/09/2012 01/09/2012--normal colonoscopy except for diffuse diverticulosis.    • COLONOSCOPY  2004 2004--colonoscopy normal.   • COLONOSCOPY N/A 5/31/2022    Procedure: COLONOSCOPY to cecum into TI with hot snare polypectomies;  Surgeon: Raj Lopez MD;  Location: [facility] ENDOSCOPY;  Service: General;  Laterality: N/A;  pre: positive cologuard  post: polyps, diverticulosis, heomorrhoids   • CORONARY ANGIOPLASTY WITH STENT PLACEMENT  10/10/2010    10/10/2010--critical 80% proximal LAD stenosis treated using a drug-coated stent. Unable to cross diagonal one.   • ESOPHAGOSCOPY / EGD  01/09/2012 01/09/2012--EGD performed for iron deficiency anemia revealed mild erythema of the distal stomach and proximal duodenum consistent with duodenitis.   • PROSTATE BIOPSY  07/07/2008 07/07/2008--negative prostate biopsy.  Patient had a history of elevated PSA and found to have benign prostatic hypertrophy.          Allergies   Allergen Reactions   • Penicillins    • Sulfa Antibiotics            Current Outpatient Medications:   •  aspirin 81 MG tablet, Take 81 mg by mouth Daily., Disp: , Rfl:   •  Coenzyme Q10 (COQ10) 400 MG capsule, Take 1 capsule by mouth daily.  "Take with a meal., Disp: , Rfl:   •  metoprolol succinate XL (TOPROL-XL) 50 MG 24 hr tablet, Take 1 p.o. daily for blood pressure and heart, Disp: 90 tablet, Rfl: 3  •  rosuvastatin (CRESTOR) 20 MG tablet, Take 1 p.o. daily for high cholesterol, Disp: 90 tablet, Rfl: 3  •  vitamin D3 125 MCG (5000 UT) capsule capsule, 1 by mouth daily as directed, Disp: 30 capsule, Rfl:   •  donepezil (Aricept) 5 MG tablet, Take 1 p.o. nightly as directed for memory, Disp: 90 tablet, Rfl: 3  •  nystatin-triamcinolone (MYCOLOG II) 707388-9.1 UNIT/GM-% cream, Apply to affected area twice daily as directed, Disp: 30 g, Rfl: 1      Family History   Problem Relation Age of Onset   • Coronary artery disease Mother    • Stroke Mother    • No Known Problems Father    • Cancer Maternal Grandmother    • Heart attack Maternal Grandfather    • Heart disease Maternal Grandfather    • No Known Problems Paternal Grandmother    • No Known Problems Paternal Grandfather          Social History     Socioeconomic History   • Marital status:    Tobacco Use   • Smoking status: Former   • Smokeless tobacco: Never   • Tobacco comments:     Stopped smoking in his early 20s.   Vaping Use   • Vaping Use: Never used   Substance and Sexual Activity   • Alcohol use: Yes     Alcohol/week: 1.0 standard drink     Types: 1 Glasses of wine per week     Comment: Socially   • Drug use: No   • Sexual activity: Yes     Partners: Female         Vitals:    01/13/23 1225   BP: 128/72   Pulse: 86   Resp: 18   SpO2: 98%   Weight: 73.1 kg (161 lb 3.2 oz)   Height: 175.3 cm (69\")        Body mass index is 23.81 kg/m².      Physical Exam:    General: Alert and oriented x 3.  No acute distress.  Normal affect.  HEENT: Pupils equal, round, reactive to light; extraocular movements intact; sclerae nonicteric; pharynx, ear canals and TMs normal.  Neck: Without JVD, thyromegaly, bruit, or adenopathy.  Lungs: Clear to auscultation in all fields.  Heart: Regular rate and rhythm " without murmur, rub, gallop, or click.  Abdomen: Soft, nontender, without hepatosplenomegaly or hernia.  Bowel sounds normal.  : Deferred.  Rectal: Deferred.  Extremities: Without clubbing, cyanosis, edema, or pulse deficit.  Neurologic: Intact without focal deficit.  Normal station and gait observed during ingress and egress from the examination room.  Skin: Without significant lesion.  Musculoskeletal: Unremarkable.    Lab/other results:    I reviewed the neuropsychiatric evaluation at length.    Assessment/Plan:     Diagnosis Plan   1. Mild cognitive impairment with memory loss  donepezil (Aricept) 5 MG tablet      2. Tinea corporis  nystatin-triamcinolone (MYCOLOG II) 220927-0.1 UNIT/GM-% cream          January 13, 2023--patient seen in follow-up and I reviewed the neuropsychological testing.  He would like to go on medication to try to improve or slow up loss of memory and I think this is certainly reasonable.      Plan is as follows: Trial of Aricept with slow titration.  And in the future we will plan on adding Namenda once he is on Aricept 10 mg/day.  Patient has a follow-up appointment in March which I will have him keep.    August 18, 2022--neuropsychological testing consistent with mild cognitive impairment with memory loss.    March 16, 2020--patient reports he is having some difficulty with memory, specifically recall of names.  The problem is somewhat intermittent and he attributes that possibly to aging but he wanted to discuss it anyway.  I explained him we could do some formal neuropsychological testing to determine if there is anything pathologically wrong but at the present time patient does not think the symptoms are bad enough to warrant testing but we will consider this in the future if the symptoms seem to progress.    Addendum: Patient has a rash in several locations but primarily on his torso on the back lower and and in the groin.  He has been using hydrocortisone cream on this and this  makes assessment difficult if not impossible.  I think we are dealing with Tenia corporis and we will treated as such with Mycolog cream twice daily.      Procedures

## 2023-02-01 ENCOUNTER — OFFICE VISIT (OUTPATIENT)
Dept: INTERNAL MEDICINE | Facility: CLINIC | Age: 83
End: 2023-02-01
Payer: MEDICARE

## 2023-02-01 VITALS
HEART RATE: 62 BPM | SYSTOLIC BLOOD PRESSURE: 110 MMHG | HEIGHT: 69 IN | OXYGEN SATURATION: 99 % | DIASTOLIC BLOOD PRESSURE: 70 MMHG | BODY MASS INDEX: 23.85 KG/M2 | WEIGHT: 161 LBS | TEMPERATURE: 97.9 F

## 2023-02-01 DIAGNOSIS — R05.1 ACUTE COUGH: Primary | ICD-10-CM

## 2023-02-01 LAB
EXPIRATION DATE: NORMAL
FLUAV AG UPPER RESP QL IA.RAPID: NOT DETECTED
FLUBV AG UPPER RESP QL IA.RAPID: NOT DETECTED
INTERNAL CONTROL: NORMAL
Lab: NORMAL
SARS-COV-2 AG UPPER RESP QL IA.RAPID: NOT DETECTED

## 2023-02-01 PROCEDURE — 87428 SARSCOV & INF VIR A&B AG IA: CPT | Performed by: NURSE PRACTITIONER

## 2023-02-01 PROCEDURE — 99213 OFFICE O/P EST LOW 20 MIN: CPT | Performed by: NURSE PRACTITIONER

## 2023-02-01 RX ORDER — DEXTROMETHORPHAN HYDROBROMIDE AND PROMETHAZINE HYDROCHLORIDE 15; 6.25 MG/5ML; MG/5ML
5 SYRUP ORAL 2 TIMES DAILY PRN
Qty: 118 ML | Refills: 0 | Status: SHIPPED | OUTPATIENT
Start: 2023-02-01 | End: 2023-02-08

## 2023-02-01 RX ORDER — BENZONATATE 200 MG/1
200 CAPSULE ORAL 3 TIMES DAILY PRN
Qty: 21 CAPSULE | Refills: 0 | Status: SHIPPED | OUTPATIENT
Start: 2023-02-01 | End: 2023-02-08

## 2023-02-01 RX ORDER — AZITHROMYCIN 250 MG/1
TABLET, FILM COATED ORAL
Qty: 6 TABLET | Refills: 0 | Status: SHIPPED | OUTPATIENT
Start: 2023-02-01 | End: 2023-02-28

## 2023-02-01 NOTE — PROGRESS NOTES
"Chief Complaint  Cough (/ Congestion & head pressure ; Sx started Sunday evening )    Subjective        Sergey Mendoza presents to [facility]  History of Present Illness    Patient is here today with his wife with c/o cough, congestion, head pressure x 3 days.   She has been giving him mucinex.   Patient denies any shortness of breath or high fever at this time.  Patient denies being around anybody else who has been sick.    Objective   Vital Signs:  /70   Pulse 62   Temp 97.9 °F (36.6 °C)   Ht 175.3 cm (69.02\")   Wt 73 kg (161 lb)   SpO2 99%   BMI 23.76 kg/m²   Estimated body mass index is 23.76 kg/m² as calculated from the following:    Height as of this encounter: 175.3 cm (69.02\").    Weight as of this encounter: 73 kg (161 lb).       BMI is within normal parameters. No other follow-up for BMI required.      Physical Exam  Vitals and nursing note reviewed.   Constitutional:       Appearance: Normal appearance.   HENT:      Head: Normocephalic.      Right Ear: Tympanic membrane, ear canal and external ear normal. There is no impacted cerumen.      Left Ear: Tympanic membrane, ear canal and external ear normal. There is no impacted cerumen.      Nose: Congestion and rhinorrhea present.      Comments:  c/o cough, congestion, head pressure x 3 days.     Mouth/Throat:      Mouth: Mucous membranes are moist.      Pharynx: No oropharyngeal exudate or posterior oropharyngeal erythema.   Eyes:      Pupils: Pupils are equal, round, and reactive to light.   Cardiovascular:      Rate and Rhythm: Normal rate and regular rhythm.      Pulses: Normal pulses.      Heart sounds: Normal heart sounds.      Comments: No peripheral edema noted.   Pulmonary:      Effort: Pulmonary effort is normal. No respiratory distress.      Breath sounds: Normal breath sounds. No stridor. No wheezing, rhonchi or rales.      Comments: Denies SOB  Chest:      Chest wall: No tenderness.   Skin:     General: " Skin is warm.      Capillary Refill: Capillary refill takes less than 2 seconds.   Neurological:      Mental Status: He is alert and oriented to person, place, and time.   Psychiatric:         Behavior: Behavior normal.        Result Review :    Common labs    Common Labs 3/18/22 3/18/22 3/18/22 3/18/22 8/23/22    0806 [id number] [id number] 0806    Glucose  97      BUN  14      Creatinine  1.06   1.10   Sodium  142      Potassium  4.6      Chloride  106      Calcium  9.3      Albumin  4.40      Total Bilirubin  0.8      Alkaline Phosphatase  51      AST (SGOT)  17      ALT (SGPT)  15      WBC 3.05 (A)       Hemoglobin 12.6 (A)       Hematocrit 38.6       Platelets 110 (A)       Total Cholesterol    110    Triglycerides    117    PSA   4.570 (A)     (A) Abnormal value       Comments are available for some flowsheets but are not being displayed.                        Assessment and Plan   Diagnoses and all orders for this visit:    1. Acute cough (Primary)  -     POCT SARS-CoV-2 Antigen MARNI + Flu    Other orders  -     azithromycin (Zithromax Z-Home) 250 MG tablet; Take 2 tablets by mouth on day 1, then 1 tablet daily on days 2-5  Dispense: 6 tablet; Refill: 0  -     benzonatate (TESSALON) 200 MG capsule; Take 1 capsule by mouth 3 (Three) Times a Day As Needed for Cough for up to 7 days.  Dispense: 21 capsule; Refill: 0  -     promethazine-dextromethorphan (PROMETHAZINE-DM) 6.25-15 MG/5ML syrup; Take 5 mL by mouth 2 (Two) Times a Day As Needed for Cough for up to 7 days.  Dispense: 118 mL; Refill: 0    Negative COVID and flu test in the office.  Patient will start medication as directed.  If he develops any shortness of breath or high fever that does not go down with time ibuprofen he knows to go to the emergency room.  Patient and wife both agree with treatment plan at this time.         Follow Up   Return if symptoms worsen or fail to improve.  Patient was given instructions and counseling regarding his condition or for health  maintenance advice. Please see specific information pulled into the AVS if appropriate.

## 2023-02-21 ENCOUNTER — TELEPHONE (OUTPATIENT)
Dept: NEUROLOGY | Facility: CLINIC | Age: 83
End: 2023-02-21

## 2023-02-21 ENCOUNTER — TELEMEDICINE (OUTPATIENT)
Dept: NEUROLOGY | Facility: CLINIC | Age: 83
End: 2023-02-21
Payer: MEDICARE

## 2023-02-21 DIAGNOSIS — R41.3 SHORT-TERM MEMORY LOSS: Primary | ICD-10-CM

## 2023-02-21 DIAGNOSIS — G31.84 MILD COGNITIVE IMPAIRMENT: ICD-10-CM

## 2023-02-21 PROCEDURE — 99214 OFFICE O/P EST MOD 30 MIN: CPT | Performed by: PSYCHIATRY & NEUROLOGY

## 2023-02-21 RX ORDER — DONEPEZIL HYDROCHLORIDE 10 MG/1
10 TABLET, FILM COATED ORAL DAILY
Qty: 90 TABLET | Refills: 4 | Status: SHIPPED | OUTPATIENT
Start: 2023-02-21 | End: 2023-03-27

## 2023-02-21 NOTE — TELEPHONE ENCOUNTER
Caller: Jes Mendoza    Relationship to patient: Emergency Contact    Best call back number: [phone]    Type of visit: FOLLOW UP     Requested date: 3 MONTHS FROM 02/21/23     If rescheduling, when is the original appointment: 03/13/24     Additional notes:PATIENT HAD A TELEHEALTH VISIST WITH DR. JEROME TODAY AND WAS TOLD TO SCHEDULE FOR 3 MONTHS FROM NOW. THE EARLIEST IN THE SYSTEM IS FOR MARCH OF 2024. IS THERE ANYTHING CLOSER TO THE 3 MONTH TIME PERIOD?    PATIENT'S WIFE ASKED IF YOU WILL CALL HER TO GET THE APPT RESCHEDULED.    PLEASE REVIEW AND ADVISE  THANK YOU

## 2023-02-21 NOTE — PROGRESS NOTES
Chief Complaint:  Memory loss    Patient ID: Sergey Mendoza is a 82 y.o. male.    HPI:  This was an audio and video enabled telemedicine encounter.  The patient did consent to this type of encounter.  They are at home and I am also here at home.  I had the pleasure of speaking to Leeroy today.  As you may know he is an 82-year-old gentleman being seen for the management of cognitive impairment.  The patient did have neuropsych testing showing mild cognitive impairment as well as anxiety.  He has become more frustrated with his speech.  He says that often he will have difficulty with verbal expression.  Once he gets stuck on a word he usually gets frustrated.  He has not had significant progression of short-term memory loss.  He does maintain a good diet.  He does not perform much exercise currently.  He does get some socialization.      The following portions of the patient's history were reviewed and updated as appropriate: allergies, current medications, past family history, past medical history, past social history, past surgical history and problem list.    Review of Systems   I have reviewed the review of systems above performed by my medical assistant.      There were no vitals filed for this visit.    Neurologic Exam    Physical Exam    Procedures    Assessment/Plan: Our plan is to increase the donepezil to the 10 mg daily dosing.  I would also like to schedule speech therapy which would be also for cognitive rehabilitation and therapy.  We will likely initiate memantine at his next visit in approximately 3 to 4 months.  A total of 30 minutes was spent during this visit discussing signs and symptoms of cognitive impairment, patient education, plan of care and prognosis.  s (Primary)  -     donepezil (Aricept) 10 MG tablet; Take 1 tablet by mouth      Diagnoses and all orders for this visit:    1. Short-term memory loss (Primary)  -     donepezil (Aricept) 10 MG tablet; Take 1 tablet by mouth Daily for 90 days.   Dispense: 90 tablet; Refill: 4    2. Mild cognitive impairment  -     donepezil (Aricept) 10 MG tablet; Take 1 tablet by mouth Daily for 90 days.  Dispense: 90 tablet; Refill: 4  -     Ambulatory Referral to Speech Therapy           Mario Ontiveros II, MD

## 2023-02-24 ENCOUNTER — TELEPHONE (OUTPATIENT)
Dept: NEUROLOGY | Facility: CLINIC | Age: 83
End: 2023-02-24

## 2023-02-24 NOTE — TELEPHONE ENCOUNTER
Called [facility] notified of new apt and new location for dr. Ontiveros.     Elly can you see about the speech therapy for them? Thank you.

## 2023-02-24 NOTE — TELEPHONE ENCOUNTER
Caller: Jes Mendoza    Relationship: Emergency Contact    Best call back number: [phone]    What is the best time to reach you: ANY    Who are you requesting to speak with (clinical staff, provider,  specific staff member): ZAC     What was the call regarding: PATIENTS WIFE TELEPHONED RE: SPEECH & OTHER APPOINTMENT. SHE WANTS ZAC TO F/U ABOUT PREVIOUS CONVERSATIONS RE:SPEECH & OTHER  APPOINTMENTS.     SHE STATES SHE CALLED YESTERDAY BUT I DO NOT SEE PREV. ENCOUNTER    DOCUMENTING PER PROTOCOL       Do you require a callback: YES

## 2023-02-28 NOTE — PATIENT INSTRUCTIONS
Negative COVID and flu test in the office.  Patient will start medication as directed.  If he develops any shortness of breath or high fever that does not go down with time ibuprofen he knows to go to the emergency room.

## 2023-03-14 ENCOUNTER — TELEPHONE (OUTPATIENT)
Dept: NEUROLOGY | Facility: CLINIC | Age: 83
End: 2023-03-14
Payer: MEDICARE

## 2023-03-14 NOTE — TELEPHONE ENCOUNTER
Caller: Jes Mendoza    Relationship: Emergency Contact    Best call back number: [phone]    What was the call regarding: PATIENT'S WIFE IS CALLING BECAUSE SINCE THE CHANGE IN THE QUANTITY AND TIME OF TAKING THE DONEPEZIL, THE PATIENT HAS STARTED HAVING TIREDNESS, WEAKNESS, LOSS OF APPETITE, LOSING WEIGHT, DROWSINESS, AND PAIN IN HIS HEAD. PATIENT IS CURRENTLY  TAKING 10 MG IN THE MORNING AND HAS BEEN SINCE 2/22/23.    Do you require a callback: YES

## 2023-03-15 ENCOUNTER — TELEPHONE (OUTPATIENT)
Dept: INTERNAL MEDICINE | Facility: CLINIC | Age: 83
End: 2023-03-15

## 2023-03-15 ENCOUNTER — DOCUMENTATION (OUTPATIENT)
Dept: NEUROLOGY | Facility: CLINIC | Age: 83
End: 2023-03-15
Payer: MEDICARE

## 2023-03-15 ENCOUNTER — HOSPITAL ENCOUNTER (OUTPATIENT)
Dept: SPEECH THERAPY | Facility: HOSPITAL | Age: 83
Setting detail: THERAPIES SERIES
Discharge: HOME | End: 2023-03-15
Payer: MEDICARE

## 2023-03-15 DIAGNOSIS — G31.84 MILD COGNITIVE IMPAIRMENT WITH MEMORY LOSS: Primary | ICD-10-CM

## 2023-03-15 DIAGNOSIS — R41.841 COGNITIVE COMMUNICATION DEFICIT: ICD-10-CM

## 2023-03-15 PROCEDURE — 92523 SPEECH SOUND LANG COMPREHEN: CPT

## 2023-03-15 NOTE — TELEPHONE ENCOUNTER
"     Caller: Sergey Mendoza \"Leeroy\"    Relationship to patient: Self    Best call back number: [phone]    Patient is needing: PLEASE CONTACT PATIENT TO SET UP LABS BEFORE APPOINTMENT WITH DR. VELÁSQUEZ ON 03/27.      "

## 2023-03-15 NOTE — PROGRESS NOTES
PT'S WIFE CALLING TO REPORT THAT PT IS HAVING PROBLEMS SINCE HIS RECENT MEDICATION CHANGE. SHE SAYS THAT HE IS TIRED ALL THE TIME AND DROWSY.  SHE REPORTS THAT HE IS WEAK AND HAS LOSS OF APPETITE.  I ADVISED HER TO PROCEED TO THE NEAREST E D.  SHE SAYS THAT SHE NEEDS TO TALK TO DR JEROME.

## 2023-03-15 NOTE — THERAPY EVALUATION
Outpatient Speech Language Pathology   Adult Speech Language Cognitive Initial Evaluation  [facility]     Patient Name: Sergey Mendoza  : 1940  MRN: [id number]  Today's Date: 3/15/2023        Visit Date: 03/15/2023   Patient Active Problem List   Diagnosis   • Benign essential hypertension   • Benign prostatic hypertrophy, 2008--negative biopsy.   • Carotid artery plaque, 2021--mild bilateral.  09/10/2018--mild bilateral. 2016--mild bilateral carotid artery plaque.   • Coronary artery disease involving native coronary artery of native heart without angina pectoris   • Diverticulosis of colon   • Gastroesophageal reflux disease without esophagitis   • Generalized osteoarthritis of multiple sites   • Hyperlipidemia   • History of myocardial infarction, 10/08/2010--patient presented with an anterior myocardial infarction.   • Primary osteoarthritis of right knee   • History of PTCA, 10/08/2010--anterior MI.  EF 40%.  PTCA with drug-eluting stent proximal LAD.  10% LM.  100% diagonal 1.  30% diffuse circumflex.  30% diffuse RCA.  --EF 64%   • Vitamin D deficiency   • Elevated PSA   • Internal hemorrhoids   • Therapeutic drug monitoring   • Splenomegaly   • Multiple actinic keratoses   • Asymptomatic cholelithiasis   • Microscopic hematuria   • Mild cognitive impairment with memory loss   • Thrombocytopenia (CMS/HCC), related to splenomegaly.   • History of 2019 novel coronavirus disease (COVID-19)   • History of colon polyps, 2022--tubular adenoma x2.   • Tinea corporis        Past Medical History:   Diagnosis Date   • Asymptomatic cholelithiasis 2018   • Benign essential hypertension 2006    Diagnosed approximately 2006.   • Benign prostatic hypertrophy, 2008--negative biopsy. 2008    Patient had a history of elevated PSA and found to have benign prostatic hypertrophy.   2008--negative prostate biopsy.   • Carotid artery plaque, 2021--mild  bilateral.  09/10/2018--mild bilateral. 08/11/2016--mild bilateral carotid artery plaque. 04/21/2009 April 26, 2021--carotid Doppler study reveals mild bilateral carotid plaque.  September 10, 2018--carotid Doppler study reveals mild bilateral carotid artery plaque.  08/11/2016--carotid Doppler study reveals mild bilateral carotid plaque.  05/28/2014--vascular screen reveals mild bilateral carotid plaque, negative for AAA, negative for PAD.   03/30/2011--vascular screen revealed mild bilateral ca   • Diverticulosis of colon 01/09/2012 01/09/2012--normal colonoscopy except for diffuse diverticulosis.   2004--colonoscopy normal.   • Elevated PSA 11/06/2013 02/13/2017--patient seen in follow-up and reports he is followed on a yearly basis by the urologist.  Last follow-up was in December 2016 and his PSA was less than 5.  07/28/2016--PSA elevated at 4.88.  03/19/2014--he was seen in followup and his PSA was back down to 3.9. Patient was not having any urinary symptoms.  12/11/2013--patient was evaluated by the urologist and he elected to just observe the PSA.   11/06/2013--patient seen in followup in his PSA returned even higher at 5.7. He was referred back to urology.   11/06/2013--PSA returned elevated at 4.3. Patient did have some urinary symptoms consisting of periods of weak stream and dribbling. He had never been treated for chronic prostatitis. I treated him with ciprofloxacin 500 mg for 30 days.       • Gastroesophageal reflux disease without esophagitis 03/24/2016   • Generalized osteoarthritis of multiple sites 03/24/2016   • History of 2019 novel coronavirus disease (COVID-19) 11/30/2021 November 24, 2021   • History of cardiac catheterization 10/08/2010    10/08/2010--patient presented with an anterior myocardial infarction. Heart catheterization revealed anterior/apical hypokinesis with 40% ejection fraction. Severe 80% ostial LAD stenosis. Mild 10% left main. Mild diffuse LAD. Totally occluded  diagonal one. 30% diffuse circumflex. Mild 30% percent diffuse RCA. Angioplasty with drug eluding stent performed to the proximal LAD.    • History of Chronic Duodenitis 01/09/2012 01/09/2012--EGD revealed mild distal erythema of the stomach and proximal duodenum.   • History of closed Colles' fracture of left radius 05/05/1953    13 years of age.   • History of colon polyps, 5/31/2022--tubular adenoma x2. 5/31/2022    May 31, 2022--colonoscopy revealed tubular adenoma x2.  Proximal and ascending colon.   • History of iron deficiency anemia 06/23/2012 02/07/2017--hemoglobin normal at 14.2, hematocrit normal at 45.1.  Serum iron normal at 105.  Iron saturation normal at 30%.  Resolve this issue.  08/02/2016--patient seen in follow-up.  White count is low at 3.66.  Hemoglobin low at 13.3.  Hematocrit normal at 41.8.  Platelets low at 109.  Homocystine and methylmalonic acid are normal.  Serum iron normal at 98.  Iron saturation normal at 28.  Patient is taking iron sulfate 325 mg daily.  10/13/2013--CBC was entirely normal, anemia resolved we'll continue to monitor.  06/23/2012--patient treated for iron deficiency anemia caused by duodenitis which was revealed per EGD 01/09/2012.    • History of myocardial infarction, 10/08/2010--patient presented with an anterior myocardial infarction. 10/08/2010    Stress test 02/23/2011--mild anterior wall ischemia, ejection fraction improved to 64%.  10/08/2010--patient presented with an anterior myocardial infarction. Heart catheterization revealed anterior/apical hypokinesis with 40% ejection fraction. Severe 80% ostial LAD stenosis. Mild 10% left main. Mild diffuse LAD. Totally occluded diagonal one. 30% diffuse circumflex. Mild 30% percent diffuse RCA. Angioplasty with drug eluding stent performed to the proximal LAD.    • History of PTCA, 10/08/2010--anterior MI.  EF 40%.  PTCA with drug-eluting stent proximal LAD.  10% LM.  100% diagonal 1.  30% diffuse circumflex.   30% diffuse RCA.  2011--EF 64% 10/08/2010    Stress test 02/23/2011--mild anterior wall ischemia, ejection fraction improved to 64%.  10/08/2010--patient presented with an anterior myocardial infarction. Heart catheterization revealed anterior/apical hypokinesis with 40% ejection fraction. Severe 80% ostial LAD stenosis. Mild 10% left main. Mild diffuse LAD. Totally occluded diagonal one. 30% diffuse circumflex. Mild 30% percent diffuse RCA. Angioplasty with drug eluding stent performed to the proximal LAD.    • History of Tear of medial meniscus of right knee 10/17/2014    12/09/2014--patient was evaluated by the orthopedist. He felt that patient also had end stage osteoarthritis the right knee and will eventually need a total knee replacement. In the meantime he gave a cortisone injection in the knee and patient reports this did help. Patient also went to physical therapy which seemed to help somewhat also.   10/29/2014--MRI of the right knee reveals tricompartmental osteoarthritis that is severe in the medial compartment. There is a degenerative tear of the medial meniscus. There is a joint effusion with Baker's cyst. Intramuscular extension of the Baker's cyst into the medial head of the gastrocnemius muscle. Patient referred to orthopedics.   10/17/2014--x-ray of the right knee reveals a small suprapatellar effusion. Narrowing of the medial compartment. There is patellar spur formation. No fracture, dislocation, bone lesion is demonstrated.   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 He was trying to sit down and was in an awkward position.    • Hyperlipidemia 03/06/2011 03/16/2011--treatment for hyperlipidemia and initiated.   • Internal hemorrhoids 03/24/2016   • Microscopic hematuria 02/19/2018 12/20/2017--patient was evaluated by the urologist for a routine follow-up elevated PSA.  He was noted to have microscopic hematuria with 3-5 RBCs.  Cystoscopy was reportedly negative.   Patient also had a CT scan of the abdomen and pelvis which revealed asymptomatic cholelithiasis, diffuse atherosclerosis, and enlarged prostate.   • Multiple actinic keratoses 08/02/2016 08/02/2016--patient presents with multiple actinic keratoses as well as seborrheic keratoses.  Dermatology referral given.   • Occlusive coronary artery disease, 10/08/2010--anterior MI.  EF 40%.  PTCA with drug-eluting stent proximal LAD.  10% LM.  100% diagonal 1.  30% diffuse circumflex.  30% diffuse RCA. 10/08/2010    02/23/2011--stress Cardiolite reveals mild anterior wall ischemia, ejection fraction improved to 64%.  10/08/2010--patient presented with an anterior myocardial infarction. Heart catheterization revealed anterior/apical hypokinesis with 40% ejection fraction. Severe 80% ostial LAD stenosis. Mild 10% left main. Mild diffuse LAD. Totally occluded diagonal one. 30% diffuse circumflex. Mild 30% percent diffuse RCA. Angioplasty with drug eluding stent performed to the proximal LAD.   10/08/2010--anterior MI.  EF 40%.  PTCA with drug-eluting stent proximal LAD.  10% LM.  100% diagonal 1.  30% diffuse circumflex.  30% diffuse RCA.   • Primary osteoarthritis of right knee 10/17/2014    12/09/2014--patient was evaluated by the orthopedist. He felt that patient also had end stage osteoarthritis the right knee and will eventually need a total knee replacement. In the meantime he gave a cortisone injection in the knee and patient reports this did help. Patient also went to physical therapy which seemed to help somewhat also.  10/29/2014--MRI of the right knee reveals tricompartmental osteoarthritis that is severe in the medial compartment. There is a degenerative tear of the medial meniscus. There is a joint effusion with Baker's cyst. Intramuscular extension of the Baker's cyst into the medial head of the gastrocnemius muscle. Patient referred to orthopedics.   10/17/2014--x-ray of the right knee reveals a small  suprapatellar effusion. Narrowing of the medial compartment. There is patellar spur formation. No fracture, dislocation, bone lesion is demonstrated.   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 He was trying to sit down and was in an awkward position. T   • Splenomegaly 08/02/2016 09/06/2013--CT scan reveals mild splenomegaly.  Although not in the report, the radiologist did compare the CT scan from 09/06/2013 to the one from 2010.  The spleen actually looks smaller in size, 13.5 cm compared to 15 cm previously.  In another direction, spleen is 20 cm as compared to 24 cm.  The last dimension was the maximum length.  08/04/2010--initial evaluation by the hematologist. WBCs 3.5. Differential normal. Absolute neutrophil count 2500. Hemoglobin normal at 14.2. MCV low at 83. Platelet count low at 125. IPF normal at 4.6%. Review of old labs through 1997 revealed a white blood cell count has fluctuated between 2.8 and 3.9. There is no trend downward but it is fluctuating. Platelet count has ranged between 148 and 198. MCV has been low. Liver spleen scan revealed mild splenomegaly. B12, folic acid, iron studies, rheumatoid factor, SPEP have all been normal. Felt to be mild pancytopenia secondary to splenomegaly. Patient is followed on a regular basis by the hematologist.   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 03/24/2021   • Vitamin D deficiency 03/24/2016        Past Surgical History:   Procedure Laterality Date   • CARDIAC CATHETERIZATION  10/08/2010    See past medical history   • COLONOSCOPY  01/09/2012 01/09/2012--normal colonoscopy except for diffuse diverticulosis.    • COLONOSCOPY  2004 2004--colonoscopy normal.   • COLONOSCOPY N/A 5/31/2022    Procedure: COLONOSCOPY to cecum into TI with hot snare polypectomies;  Surgeon: Raj Lopez MD;  Location: [facility] ENDOSCOPY;  Service: General;  Laterality: N/A;  pre: positive cologuard  post: polyps,  "diverticulosis, heomorrhoids   • CORONARY ANGIOPLASTY WITH STENT PLACEMENT  10/10/2010    10/10/2010--critical 80% proximal LAD stenosis treated using a drug-coated stent. Unable to cross diagonal one.   • ESOPHAGOSCOPY / EGD  01/09/2012 01/09/2012--EGD performed for iron deficiency anemia revealed mild erythema of the distal stomach and proximal duodenum consistent with duodenitis.   • PROSTATE BIOPSY  07/07/2008 07/07/2008--negative prostate biopsy.  Patient had a history of elevated PSA and found to have benign prostatic hypertrophy.          Visit Dx:    ICD-10-CM ICD-9-CM   1. Mild cognitive impairment with memory loss  G31.84 331.83   2. Cognitive communication deficit  R41.841 799.52        03/15/23 1300   Communication Assessment/Intervention   Document Type evaluation   Total Evaluation Minutes, SLP 50   Subjective Information no complaints   Patient Observations alert;cooperative;agree to therapy   Patient/Family/Caregiver Comments/Observations The patient and the patient's wife report that the patient has been having more trouble remembering information, including names. He also has trouble finding words.   Patient Effort good   Symptoms Noted During/After Treatment none   General Information   Patient Profile Reviewed yes   Pertinent History Of Current Problem The patient's wife was present during interview and helped provide case history information. The patient and the patient's wife report that the patient was dxd with \"mild cognitive impairment\" by neuropsych, Dr Muniz. The patient has also worked with neurologist and PCP. The patient's wife reports that the patient is currenlty taking denepezil 10mg with possible mild improvements. They plan to follow up with MD regarding possible side effects. 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 He " would like to improve cognitive and communicaiton abilities.   Precautions/Limitations, Vision WFL;difficult to assess   Precautions/Limitations, Hearing WFL;for purposes of eval   Prior Level of Function-Communication WFL   Plans/Goals Discussed with patient;spouse/S.O.   Barriers to Rehab cognitive status   Patient's Goals for Discharge return to all previous roles/activities;functional communication;functional cognition   Family Goals for Discharge functional communication;functional cognition;patient able to return to previous activities/roles   Standardized Assessment Used RBANS   Standardized Tests   Cognitive/Memory Tests RBANS: Repeatable Battery for the Assessment of Neuropsychological Status   RBANS- Repeatable Battery for the Assessment of Neuropsychological Status   Immediate Memory Index Score 78   Immediate Memory Percentile 7 %   Immediate Memory Qualitative Description borderline   Visuospatial Index Score 121   Visuospatial Percentile 92 %   Visuospatial Qualitative Description superior   Language Index Score 86   Language Percentile 18 %   Language Qualitative Description low average   RBANS Comments Patient demonstrated superior abilities with visuospatial abliities. He demonstrated difficulties with immediate memory with bordeline scores. He also demonstrated difficulties with generative naming placing his language abilities at the low average range.   SLP Evaluation Clinical Impressions   SLP Diagnosis mild;cognitive-linguistic disorder   Rehab Potential/Prognosis good   SLC Criteria for Skilled Therapy Interventions Met yes   Functional Impact functional impact in social situations;difficulty communicating wants, needs;restrictions in personal and social life;difficulty completing home management task;difficulty completing vocational tasks   Recommendations   Therapy Frequency (SLP SLC) 1 day per week   Predicted Duration Therapy Intervention (Days) until discharge   Anticipated Discharge  Disposition (SLP) home   Communication Strategy Suggestions eliminate distractions when speaking with patient;avoid multi-step instructions        OP SLP Assessment/Plan - 03/15/23 1532        SLP Assessment    Functional Problems Speech Language- Adult/Cognition  -BB    Impact on Function: Adult Speech Language/Cognition Difficulty communicating wants, needs, and ideas;Restrictions in personal and social life;Difficulty participating in avocational activities;Decreased recall of personal information and medical history  -BB    Clinical Impression: Speech Language-Adult/Congnition Mild:;Cognitive Communication Impairment  -BB    Please refer to paper survey for additional self-reported information Yes  -BB    Please refer to items scanned into chart for additional diagnostic informaiton and handouts as provided by clinician Yes  -BB    Prognosis Good (comment)  -BB    Patient/caregiver participated in establishment of treatment plan and goals Yes  -BB    Patient would benefit from skilled therapy intervention Yes  -BB       SLP Plan    Frequency 1-2x week  -BB    Duration 12 weeks  -BB    Planned CPT's? SLP DEV COG SKILLS INITIAL (15 MIN) : 46482;SLP DEV COG SKILLS ADD (15 MIN) : 17786  -BB    Expected Duration of Therapy Session (SLP Eval) 45  -BB          User Key  (r) = Recorded By, (t) = Taken By, (c) = Cosigned By    Initials Name Provider Type    Easton Crenshaw SLP Speech and Language Pathologist                     OP SLP Education     Row Name 03/15/23 1531       Education    Barriers to Learning No barriers identified  -BB    Education Provided Patient participated in establishing goals and treatment plan;Described results of evaluation;Patient expressed understanding of evaluation;Family/caregivers participated in establishing goals and treatment plan  -BB    Assessed Learning needs;Learning motivation;Learning preferences;Learning readiness  -BB    Learning Motivation Strong  -BB    Learning Method  Written materials;Explanation  -BB    Teaching Response Verbalized understanding  -BB          User Key  (r) = Recorded By, (t) = Taken By, (c) = Cosigned By    Initials Name Effective Dates    Easton Crenshaw SLP 02/19/23 -                SLP OP Goals     Row Name 03/15/23 1500          Goal Type Needed    Goal Type Needed Memory;Attention/Orientation;Other Adult Goals  -BB        Memory Goals    Memory LTG's Patient and family will implement compensatory strategies to maximize patient’s Memory function so patient can continue to participate in daily activities;Patient will be able to remember  information needed to participate in activities of daily living  -BB     Patient and family will implement compensatory strategies to maximize patient’s Memory function so patient can continue to participate in daily activities 80%:  -BB     Status: Patient and family will implement compensatory strategies to maximize patient’s Memory function so patient can continue to participate in daily activities New  -BB     Status: Patient will be able to remember  information needed to participate in activities of daily living New  -BB     Memory STG's Patient’s memory skills will be enhanced as reported by patient by utilizing internal memory strategies to recall up to 3 pieces of information after a 5- minute delay;Patient’s memory skills will be enhanced as reported by patient by using external memory aides  -BB     Patient’s memory skills will be enhanced as reported by patient by utilizing internal memory strategies to recall up to 3 pieces of information after a 5- minute delay 80%:  -BB     Status: Patient’s memory skills will be enhanced as reported by patient by utilizing internal memory strategies to recall up to 3 pieces of information after a 5- minute delay New  -BB     Patient’s memory skills will be enhanced as reported by patient by using external memory aides 80%:  -BB     Status: Patient’s memory skills will be  enhanced as reported by patient by using external memory aides New  -BB        Attention/Orientation Goals    Attention/Orientation LTG's Patient will be able to safely perform ADLs  -BB     Patient will be able to safely perform ADLs With Supervision  -BB     Status: Patient will be able to safely perform ADLs New  -BB     Attention/Orientation STG's Patient will improve attention skills by sustaining focus and participation to conversation/task  -BB     Patient will improve attention skills by sustaining focus and participation to conversation/task 10 minute task  -BB     Status: Patient will improve attention skills by sustaining focus and participation to conversation/task New  -BB        SLP Time Calculation    SLP Goal Re-Cert Due Date 04/15/23  -BB           User Key  (r) = Recorded By, (t) = Taken By, (c) = Cosigned By    Initials Name Provider Type    Easton Crenshaw SLP Speech and Language Pathologist                     Time Calculation:   SLP Start Time: 0845  SLP Stop Time: 0930  SLP Time Calculation (min): 45 min  Total Timed Code Minutes- SLP: 45 minute(s)  Untimed Charges  SLP Eval/Re-eval : ST Eval Speech and Production w/ Language - 14523  [id number] Eval Speech and Production w/ Language Minutes: 45  Total Minutes  Untimed Charges Total Minutes: 45   Total Minutes: 45    Therapy Charges for Today     Code Description Service Date Service Provider Modifiers Qty    [id number] HC ST EVAL SPEECH AND PROD W LANG  3 3/15/2023 Easton Cronin, SLP GN 1                   RADHIKA Gonzalez  3/15/2023

## 2023-03-15 NOTE — TELEPHONE ENCOUNTER
Tried to reach out to Jes.  No answer.  Had to leave a voicemail.  I would like for them to discontinue the medication since it is causing some side effects.  If their symptoms worsen he is to go to the emergency room.

## 2023-03-15 NOTE — TELEPHONE ENCOUNTER
"Caller: Jes Mendoza    Relationship: Emergency Contact; [facility]    Best call back number: [phone]    What was the call regarding: PT'S WIFE CALLING TO CHECK FOR UPDATE REGARDING PT'S NEW MEDICATION CONCERNS. PT'S WIFE REITERATES PT'S SIDE EFFECTS LISTED PREVIOUSLY BY NILDA.    PT'S WIFE STATES PT IS ALSO HAVING INCREASED PRESSURE IN HEAD/\"FULLNESS SENSATION IN HEAD\". CHARACTERISTICS INCLUDE DULL, ACHING PAIN. PT HAS HAD INCREASED SINUS DRAINAGE/RUNNY NOSE SINCE STARTING THE MEDICATION. PT'S WIFE ALSO NOTES INCREASED FREQUENCY IN URINATION & BOWEL MOVEMENTS.    PT'S WIFE STATES THESE SYMPTOMS COME ON ABOUT 2 HOURS FOLLOWING PT'S MORNING DOSAGE. AS THE DAY GOES ON, THE SIDE EFFECTS SEEM TO SUBSIDE. PT'S WIFE STATES PT WAS NOT HAVING THESE CONCERNS WHEN HE WAS ON THE PREVIOUS DOSAGE OF DONEPEZIL.    GIVEN THE EXTENSIVE # OF POTENTIAL SIDE EFFECTS OF MEDICATION, CALL WARM-TRANSFERRED TO [facility] FOR FURTHER TRIAGING.  "

## 2023-03-20 ENCOUNTER — HOSPITAL ENCOUNTER (OUTPATIENT)
Dept: SPEECH THERAPY | Facility: HOSPITAL | Age: 83
Setting detail: THERAPIES SERIES
Discharge: HOME | End: 2023-03-20
Payer: MEDICARE

## 2023-03-20 DIAGNOSIS — R41.841 COGNITIVE COMMUNICATION DEFICIT: ICD-10-CM

## 2023-03-20 DIAGNOSIS — G31.84 MILD COGNITIVE IMPAIRMENT WITH MEMORY LOSS: Primary | ICD-10-CM

## 2023-03-20 PROCEDURE — 97129 THER IVNTJ 1ST 15 MIN: CPT

## 2023-03-20 PROCEDURE — 97130 THER IVNTJ EA ADDL 15 MIN: CPT

## 2023-03-20 NOTE — THERAPY TREATMENT NOTE
Outpatient Speech Language Pathology   Adult Speech Language Cognitive Treatment Note  [facility]     Patient Name: Sergey Mendoza  : 1940  MRN: [id number]  Today's Date: 3/20/2023         Visit Date: 2023   Patient Active Problem List   Diagnosis   • Benign essential hypertension   • Benign prostatic hypertrophy, 2008--negative biopsy.   • Carotid artery plaque, 2021--mild bilateral.  09/10/2018--mild bilateral. 2016--mild bilateral carotid artery plaque.   • Coronary artery disease involving native coronary artery of native heart without angina pectoris   • Diverticulosis of colon   • Gastroesophageal reflux disease without esophagitis   • Generalized osteoarthritis of multiple sites   • Hyperlipidemia   • History of myocardial infarction, 10/08/2010--patient presented with an anterior myocardial infarction.   • Primary osteoarthritis of right knee   • History of PTCA, 10/08/2010--anterior MI.  EF 40%.  PTCA with drug-eluting stent proximal LAD.  10% LM.  100% diagonal 1.  30% diffuse circumflex.  30% diffuse RCA.  --EF 64%   • Vitamin D deficiency   • Elevated PSA   • Internal hemorrhoids   • Therapeutic drug monitoring   • Splenomegaly   • Multiple actinic keratoses   • Asymptomatic cholelithiasis   • Microscopic hematuria   • Mild cognitive impairment with memory loss   • Thrombocytopenia (CMS/HCC), related to splenomegaly.   • History of 2019 novel coronavirus disease (COVID-19)   • History of colon polyps, 2022--tubular adenoma x2.   • Tinea corporis          Visit Dx:    ICD-10-CM ICD-9-CM   1. Mild cognitive impairment with memory loss  G31.84 331.83   2. 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  -BB        Memory Goals     Patient’s memory skills will be enhanced as reported by patient by utilizing internal memory strategies to recall up to 3 pieces of information after a 5- minute delay 80%:  -BB     Status: Patient’s memory skills will be enhanced as reported by patient by utilizing internal memory strategies to recall up to 3 pieces of information after a 5- minute delay Progressing as expected  -BB     Comments: Patient’s memory skills will be enhanced as reported by patient by utilizing internal memory strategies to recall up to 3 pieces of information after a 5- minute delay Training/education regarding association and visualization strategy for recall of names/faces. SLP demonstrated association strategy and provided examples of strategy with brief written instructions. Pt asked relevant questions and demonstrated understanding via questions/answers. Patient education regarding internal/external memory strategies and rationale for such, as well as importance of intentionality. Pt verbalized understanding and in agreement with plan of care.  -BB           User Key  (r) = Recorded By, (t) = Taken By, (c) = Cosigned By    Initials Name Provider Type    Easton Crenshaw SLP Speech and Language Pathologist                       Time Calculation:   SLP Start Time: 1030  SLP Stop Time: 1115  SLP Time Calculation (min): 45 min  Total Timed Code Minutes- SLP: 45 minute(s)  Timed Charges  89908-TS Dev of Cogn Skills Initial Minutes: 15  75824-TU Dev of Cogn Skills Add Minutes: 30  Total Minutes  Timed Charges Total Minutes: 45   Total Minutes: 45    Therapy Charges for Today     Code Description Service Date Service Provider Modifiers Qty    61436539814 HC ST DEV OF COGN SKILLS INITIAL 15 MIN 3/20/2023 Easton Cronin SLP  1    64373869565 HC ST DEV OF COGN SKILLS EACH ADDT'L 15 MIN 3/20/2023 Easton Cronin SLP  2                   RADHIKA Gonzalez  3/20/2023

## 2023-03-22 ENCOUNTER — APPOINTMENT (OUTPATIENT)
Dept: SPEECH THERAPY | Facility: HOSPITAL | Age: 83
End: 2023-03-22
Payer: MEDICARE

## 2023-03-27 ENCOUNTER — OFFICE VISIT (OUTPATIENT)
Dept: INTERNAL MEDICINE | Facility: CLINIC | Age: 83
End: 2023-03-27
Payer: MEDICARE

## 2023-03-27 ENCOUNTER — HOSPITAL ENCOUNTER (OUTPATIENT)
Dept: SPEECH THERAPY | Facility: HOSPITAL | Age: 83
Setting detail: THERAPIES SERIES
Discharge: HOME | End: 2023-03-27
Payer: MEDICARE

## 2023-03-27 VITALS
HEART RATE: 57 BPM | SYSTOLIC BLOOD PRESSURE: 126 MMHG | HEIGHT: 69 IN | BODY MASS INDEX: 23.11 KG/M2 | OXYGEN SATURATION: 99 % | DIASTOLIC BLOOD PRESSURE: 64 MMHG | WEIGHT: 156 LBS

## 2023-03-27 DIAGNOSIS — L57.0 MULTIPLE ACTINIC KERATOSES: Chronic | ICD-10-CM

## 2023-03-27 DIAGNOSIS — R97.20 ELEVATED PSA: Chronic | ICD-10-CM

## 2023-03-27 DIAGNOSIS — Z86.16 HISTORY OF 2019 NOVEL CORONAVIRUS DISEASE (COVID-19): Chronic | ICD-10-CM

## 2023-03-27 DIAGNOSIS — K80.20 ASYMPTOMATIC CHOLELITHIASIS: Chronic | ICD-10-CM

## 2023-03-27 DIAGNOSIS — M15.9 GENERALIZED OSTEOARTHRITIS OF MULTIPLE SITES: Chronic | ICD-10-CM

## 2023-03-27 DIAGNOSIS — K57.30 DIVERTICULOSIS OF COLON: Chronic | ICD-10-CM

## 2023-03-27 DIAGNOSIS — G31.84 MILD COGNITIVE IMPAIRMENT WITH MEMORY LOSS: Chronic | ICD-10-CM

## 2023-03-27 DIAGNOSIS — Z86.010 HISTORY OF COLON POLYPS: Chronic | ICD-10-CM

## 2023-03-27 DIAGNOSIS — N40.1 BENIGN NON-NODULAR PROSTATIC HYPERPLASIA WITH LOWER URINARY TRACT SYMPTOMS: Chronic | ICD-10-CM

## 2023-03-27 DIAGNOSIS — I65.23 ATHEROSCLEROSIS OF BOTH CAROTID ARTERIES: Chronic | ICD-10-CM

## 2023-03-27 DIAGNOSIS — M17.11 PRIMARY OSTEOARTHRITIS OF RIGHT KNEE: Chronic | ICD-10-CM

## 2023-03-27 DIAGNOSIS — R16.1 SPLENOMEGALY: Chronic | ICD-10-CM

## 2023-03-27 DIAGNOSIS — G31.84 MILD COGNITIVE IMPAIRMENT WITH MEMORY LOSS: Primary | ICD-10-CM

## 2023-03-27 DIAGNOSIS — R41.841 COGNITIVE COMMUNICATION DEFICIT: ICD-10-CM

## 2023-03-27 DIAGNOSIS — Z51.81 THERAPEUTIC DRUG MONITORING: ICD-10-CM

## 2023-03-27 DIAGNOSIS — I25.10 CORONARY ARTERY DISEASE INVOLVING NATIVE CORONARY ARTERY OF NATIVE HEART WITHOUT ANGINA PECTORIS: Chronic | ICD-10-CM

## 2023-03-27 DIAGNOSIS — K64.8 INTERNAL HEMORRHOIDS: Chronic | ICD-10-CM

## 2023-03-27 DIAGNOSIS — I25.2 HISTORY OF MYOCARDIAL INFARCTION: Chronic | ICD-10-CM

## 2023-03-27 DIAGNOSIS — K21.9 GASTROESOPHAGEAL REFLUX DISEASE WITHOUT ESOPHAGITIS: Chronic | ICD-10-CM

## 2023-03-27 DIAGNOSIS — E55.9 VITAMIN D DEFICIENCY: Chronic | ICD-10-CM

## 2023-03-27 DIAGNOSIS — I10 BENIGN ESSENTIAL HYPERTENSION: Chronic | ICD-10-CM

## 2023-03-27 DIAGNOSIS — D69.6 THROMBOCYTOPENIA: Chronic | ICD-10-CM

## 2023-03-27 DIAGNOSIS — E78.2 MIXED HYPERLIPIDEMIA: Chronic | ICD-10-CM

## 2023-03-27 DIAGNOSIS — Z00.00 ENCOUNTER FOR SUBSEQUENT ANNUAL WELLNESS VISIT (AWV) IN MEDICARE PATIENT: Primary | ICD-10-CM

## 2023-03-27 DIAGNOSIS — R31.29 MICROSCOPIC HEMATURIA: Chronic | ICD-10-CM

## 2023-03-27 DIAGNOSIS — Z98.61 HISTORY OF PTCA: Chronic | ICD-10-CM

## 2023-03-27 PROBLEM — Z86.0100 HISTORY OF COLON POLYPS: Chronic | Status: ACTIVE | Noted: 2022-05-31

## 2023-03-27 PROBLEM — B35.4 TINEA CORPORIS: Status: RESOLVED | Noted: 2023-01-13 | Resolved: 2023-03-27

## 2023-03-27 PROCEDURE — 97129 THER IVNTJ 1ST 15 MIN: CPT

## 2023-03-27 PROCEDURE — 97130 THER IVNTJ EA ADDL 15 MIN: CPT

## 2023-03-27 NOTE — PROGRESS NOTES
The ABCs of the Annual Wellness Visit  Subsequent Medicare Wellness Visit    Subjective        Sergey Mendoza is a 82 y.o. male who presents for a Subsequent Medicare Wellness Visit.    The following portions of the patient's history were reviewed and   updated as appropriate: allergies, current medications, past family history, past medical history, past social history, past surgical history and problem list.    Compared to one year ago, the patient feels his physical   health is the same.    Compared to one year ago, the patient feels his mental   health is worse.    Recent Hospitalizations:  He was not admitted to the hospital during the last year.       Current Medical Providers:  Patient Care Team:  Ramesh Blake MD as PCP - General (Internal Medicine)    Outpatient Medications Prior to Visit   Medication Sig Dispense Refill   • aspirin 81 MG tablet Take 1 tablet by mouth Daily.     • Coenzyme Q10 (COQ10) 400 MG capsule Take 1 capsule by mouth daily. Take with a meal.     • metoprolol succinate XL (TOPROL-XL) 50 MG 24 hr tablet Take 1 p.o. daily for blood pressure and heart 90 tablet 3   • nystatin-triamcinolone (MYCOLOG II) 927988-6.1 UNIT/GM-% cream Apply to affected area twice daily as directed 30 g 1   • rosuvastatin (CRESTOR) 20 MG tablet Take 1 p.o. daily for high cholesterol 90 tablet 3   • vitamin D3 125 MCG (5000 UT) capsule capsule 1 by mouth daily as directed 30 capsule    • donepezil (Aricept) 10 MG tablet Take 1 tablet by mouth Daily for 90 days. 90 tablet 4     No facility-administered medications prior to visit.       No opioid medication identified on active medication list. I have reviewed chart for other potential  high risk medication/s and harmful drug interactions in the elderly.          Aspirin is on active medication list. Aspirin use is indicated based on review of current medical condition/s. Pros and cons of this therapy have been discussed today. Benefits of this medication  "outweigh potential harm.  Patient has been encouraged to continue taking this medication.  .      Patient Active Problem List   Diagnosis   • Benign essential hypertension   • Benign prostatic hypertrophy, 07/07/2008--negative biopsy.   • Carotid artery plaque, 4/26/2021--mild bilateral.  09/10/2018--mild bilateral. 08/11/2016--mild bilateral carotid artery plaque.   • Coronary artery disease involving native coronary artery of native heart without angina pectoris   • Diverticulosis of colon   • Gastroesophageal reflux disease without esophagitis   • Generalized osteoarthritis of multiple sites   • Hyperlipidemia   • History of myocardial infarction, 10/08/2010--patient presented with an anterior myocardial infarction.   • Primary osteoarthritis of right knee   • History of PTCA, 10/08/2010--anterior MI.  EF 40%.  PTCA with drug-eluting stent proximal LAD.  10% LM.  100% diagonal 1.  30% diffuse circumflex.  30% diffuse RCA.  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   • History of 2019 novel coronavirus disease (COVID-19)   • History of colon polyps, 5/31/2022--tubular adenoma x2.     Advance Care Planning  Advance Directive is not on file.  ACP discussion was held with the patient during this visit. Patient has an advance directive (not in EMR), copy requested.     Objective    Vitals:    03/27/23 0742   BP: 126/64   BP Location: Left arm   Patient Position: Sitting   Cuff Size: Adult   Pulse: 57   SpO2: 99%   Weight: 70.8 kg (156 lb)   Height: 175.3 cm (69.02\")     Estimated body mass index is 23.02 kg/m² as calculated from the following:    Height as of this encounter: 175.3 cm (69.02\").    Weight as of this encounter: 70.8 kg (156 " lb).    BMI is within normal parameters. No other follow-up for BMI required.      Does the patient have evidence of cognitive impairment?   Yes: Patient already evaluated by neurology            HEALTH RISK ASSESSMENT    Smoking Status:  Social History     Tobacco Use   Smoking Status Former   Smokeless Tobacco Never   Tobacco Comments    Stopped smoking in his early 20s.     Alcohol Consumption:  Social History     Substance and Sexual Activity   Alcohol Use Yes   • Alcohol/week: 1.0 standard drink   • Types: 1 Glasses of wine per week    Comment: Socially     Fall Risk Screen:    STEADI Fall Risk Assessment was completed, and patient is at LOW risk for falls.Assessment completed on:3/27/2023    Depression Screening:  PHQ-2/PHQ-9 Depression Screening 3/27/2023   Little Interest or Pleasure in Doing Things 0-->not at all   Feeling Down, Depressed or Hopeless 0-->not at all   PHQ-9: Brief Depression Severity Measure Score 0       Health Habits and Functional and Cognitive Screening:  Functional & Cognitive Status 3/24/2021   Do you have difficulty preparing food and eating? No   Do you have difficulty bathing yourself, getting dressed or grooming yourself? No   Do you have difficulty using the toilet? No   Do you have difficulty moving around from place to place? No   Do you have trouble with steps or getting out of a bed or a chair? No   Current Diet Well Balanced Diet   Dental Exam Up to date   Eye Exam Up to date   Exercise (times per week) 3 times per week   Current Exercise Activities Include Walking   Do you need help using the phone?  No   Are you deaf or do you have serious difficulty hearing?  No   Do you need help with transportation? No   Do you need help shopping? No   Do you need help preparing meals?  No   Do you need help with housework?  No   Do you need help with laundry? No   Do you need help taking your medications? No   Do you need help managing money? No   Do you ever drive or ride in a car  without wearing a seat belt? No   Have you felt unusual stress, anger or loneliness in the last month? No   Who do you live with? Spouse   If you need help, do you have trouble finding someone available to you? No   Have you been bothered in the last four weeks by sexual problems? No   Do you have difficulty concentrating, remembering or making decisions? Yes       Age-appropriate Screening Schedule:  Refer to the list below for future screening recommendations based on patient's age, sex and/or medical conditions. Orders for these recommended tests are listed in the plan section. The patient has been provided with a written plan.    Health Maintenance   Topic Date Due   • LIPID PANEL  03/18/2023   • COVID-19 Vaccine (3 - Booster for Pfizer series) 03/29/2023 (Originally 4/14/2021)   • ANNUAL WELLNESS VISIT  03/27/2024   • TDAP/TD VACCINES (2 - Td or Tdap) 02/13/2027   • COLORECTAL CANCER SCREENING  05/31/2027   • Pneumococcal Vaccine 65+  Completed   • INFLUENZA VACCINE  Discontinued   • ZOSTER VACCINE  Discontinued                CMS Preventative Services Quick Reference  Risk Factors Identified During Encounter:    Immunizations Discussed/Encouraged: COVID19    The above risks/problems have been discussed with the patient.  Pertinent information has been shared with the patient in the After Visit Summary.    Diagnoses and all orders for this visit:    1. Encounter for subsequent annual wellness visit (AWV) in Medicare patient (Primary)    2. Benign essential hypertension    3. Benign prostatic hypertrophy, 07/07/2008--negative biopsy.  -     PSA DIAGNOSTIC    4. Carotid artery plaque, 4/26/2021--mild bilateral.  09/10/2018--mild bilateral. 08/11/2016--mild bilateral carotid artery plaque.  -     Duplex Carotid Ultrasound CAR; Future    5. Coronary artery disease involving native coronary artery of native heart without angina pectoris    6. Diverticulosis of colon    7. Gastroesophageal reflux disease without  esophagitis    8. Generalized osteoarthritis of multiple sites    9. Hyperlipidemia  -     CK  -     Comprehensive Metabolic Panel  -     NMR LipoProfile  -     TSH  -     T4, Free  -     T3, Free    10. History of myocardial infarction, 10/08/2010--patient presented with an anterior myocardial infarction.    11. Primary osteoarthritis of right knee    12. History of PTCA, 10/08/2010--anterior MI.  EF 40%.  PTCA with drug-eluting stent proximal LAD.  10% LM.  100% diagonal 1.  30% diffuse circumflex.  30% diffuse RCA.  2011--EF 64%    13. Vitamin D deficiency  -     Vitamin D 25 Hydroxy    14. Elevated PSA    15. Internal hemorrhoids    16. Splenomegaly    17. Multiple actinic keratoses    18. Asymptomatic cholelithiasis    19. Microscopic hematuria    20. Mild cognitive impairment with memory loss    21. History of 2019 novel coronavirus disease (COVID-19)    22. History of colon polyps, 5/31/2022--tubular adenoma x2.    23. 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  -     CBC (No Diff)    24. Therapeutic drug monitoring        Follow Up:   Next Medicare Wellness visit to be scheduled in 1 year.      An After Visit Summary and PPPS were made available to the patient.

## 2023-03-27 NOTE — THERAPY TREATMENT NOTE
Outpatient Speech Language Pathology   Adult Speech Language Cognitive Treatment Note  [facility]     Patient Name: Sergey Mendoza  : 1940  MRN: [id number]  Today's Date: 3/27/2023         Visit Date: 2023   Patient Active Problem List   Diagnosis   • Benign essential hypertension   • Benign prostatic hypertrophy, 2008--negative biopsy.   • Carotid artery plaque, 2021--mild bilateral.  09/10/2018--mild bilateral. 2016--mild bilateral carotid artery plaque.   • Coronary artery disease involving native coronary artery of native heart without angina pectoris   • Diverticulosis of colon   • Gastroesophageal reflux disease without esophagitis   • Generalized osteoarthritis of multiple sites   • Hyperlipidemia   • History of myocardial infarction, 10/08/2010--patient presented with an anterior myocardial infarction.   • Primary osteoarthritis of right knee   • History of PTCA, 10/08/2010--anterior MI.  EF 40%.  PTCA with drug-eluting stent proximal LAD.  10% LM.  100% diagonal 1.  30% diffuse circumflex.  30% diffuse RCA.  --EF 64%   • Vitamin D deficiency   • Elevated PSA   • Internal hemorrhoids   • Therapeutic drug monitoring   • Splenomegaly   • Multiple actinic keratoses   • Asymptomatic cholelithiasis   • Microscopic hematuria   • Mild cognitive impairment with memory loss   • Thrombocytopenia (CMS/HCC), related to splenomegaly.   • History of 2019 novel coronavirus disease (COVID-19)   • History of colon polyps, 2022--tubular adenoma x2.          Visit Dx:    ICD-10-CM ICD-9-CM   1. Mild cognitive impairment with memory loss  G31.84 331.83   2. Cognitive communication deficit  R41.841 799.52                              SLP OP Goals     Row Name 23 1200          Subjective Comments    Subjective Comments Mr Mendoza reports that he feels that memory seems to be improving. He is not sure why, but he feels less pressure behind his eyes. He reports that he thinks memory  "improvement may be related to stopping medication that he was taking, using some of the memory techniques introduced during speech, and/or some other unknown cause  -BB        Memory Goals    Patient’s memory skills will be enhanced as reported by patient by utilizing internal memory strategies to recall up to 3 pieces of information after a 5- minute delay 80%:  -BB     Status: Patient’s memory skills will be enhanced as reported by patient by utilizing internal memory strategies to recall up to 3 pieces of information after a 5- minute delay Progressing as expected  -BB     Comments: Patient’s memory skills will be enhanced as reported by patient by utilizing internal memory strategies to recall up to 3 pieces of information after a 5- minute delay Delayed recall of 3 pieces of information after 10-15 min delay after training/use of association strategies: 66% acc  -BB        Attention/Orientation Goals    Attention/Orientation LTG's Patient will be able to safely perform ADLs  -BB     Patient will be able to safely perform ADLs With Supervision  -BB     Status: Patient will be able to safely perform ADLs Progressing as expected  -BB     Comments:Patient will be able to safely perform ADLs Verbal problem solving task (eg, identifying attention/recall breakdowns during avocational tasks and solutions to improve attention/recall recall tasks with attention/recall strategies): min A for identifying possible attention/recall breakdowns and identifying possible solutions/strategies. Training/education provided regarding attention/recall strategies, including \"pre-teaching\", intention, association. Pt verbalized understanding.  -BB           User Key  (r) = Recorded By, (t) = Taken By, (c) = Cosigned By    Initials Name Provider Type    Easton Crenshaw, SLP Speech and Language Pathologist                       Time Calculation:   SLP Start Time: 1015  SLP Stop Time: 1100  SLP Time Calculation (min): 45 min  Total Timed " Code Minutes- SLP: 45 minute(s)  Timed Charges  86353-UX Dev of Cogn Skills Initial Minutes: 15  67992-PH Dev of Cogn Skills Add Minutes: 30  Total Minutes  Timed Charges Total Minutes: 45   Total Minutes: 45    Therapy Charges for Today     Code Description Service Date Service Provider Modifiers Qty    73384416358 HC ST DEV OF COGN SKILLS INITIAL 15 MIN 3/27/2023 Easton Cronin, SLP  1    76156673570 HC ST DEV OF COGN SKILLS EACH ADDT'L 15 MIN 3/27/2023 Easton Cronin, SLP  2                   Easton Cronin, SLP  3/27/2023

## 2023-03-28 LAB
25(OH)D3+25(OH)D2 SERPL-MCNC: 79.3 NG/ML (ref 30–100)
ALBUMIN SERPL-MCNC: 5 G/DL (ref 3.5–5.2)
ALBUMIN/GLOB SERPL: 3.1 G/DL
ALP SERPL-CCNC: 63 U/L (ref 39–117)
ALT SERPL-CCNC: 12 U/L (ref 1–41)
AST SERPL-CCNC: 16 U/L (ref 1–40)
BILIRUB SERPL-MCNC: 1.3 MG/DL (ref 0–1.2)
BUN SERPL-MCNC: 20 MG/DL (ref 8–23)
BUN/CREAT SERPL: 19.2 (ref 7–25)
CALCIUM SERPL-MCNC: 9.8 MG/DL (ref 8.6–10.5)
CHLORIDE SERPL-SCNC: 103 MMOL/L (ref 98–107)
CHOLEST SERPL-MCNC: 132 MG/DL (ref 100–199)
CK SERPL-CCNC: 29 U/L (ref 20–200)
CO2 SERPL-SCNC: 29 MMOL/L (ref 22–29)
CREAT SERPL-MCNC: 1.04 MG/DL (ref 0.76–1.27)
EGFRCR SERPLBLD CKD-EPI 2021: 71.7 ML/MIN/1.73
ERYTHROCYTE [DISTWIDTH] IN BLOOD BY AUTOMATED COUNT: 13.2 % (ref 12.3–15.4)
GLOBULIN SER CALC-MCNC: 1.6 GM/DL
GLUCOSE SERPL-MCNC: 91 MG/DL (ref 65–99)
HCT VFR BLD AUTO: 41.3 % (ref 37.5–51)
HDL SERPL-SCNC: 32.5 UMOL/L
HDLC SERPL-MCNC: 50 MG/DL
HGB BLD-MCNC: 13.6 G/DL (ref 13–17.7)
LDL SERPL QN: 20.2 NM
LDL SERPL-SCNC: 751 NMOL/L
LDL SMALL SERPL-SCNC: 451 NMOL/L
LDLC SERPL CALC-MCNC: 64 MG/DL (ref 0–99)
MCH RBC QN AUTO: 28 PG (ref 26.6–33)
MCHC RBC AUTO-ENTMCNC: 32.9 G/DL (ref 31.5–35.7)
MCV RBC AUTO: 85 FL (ref 79–97)
PLATELET # BLD AUTO: 124 10*3/MM3 (ref 140–450)
POTASSIUM SERPL-SCNC: 4.6 MMOL/L (ref 3.5–5.2)
PROT SERPL-MCNC: 6.6 G/DL (ref 6–8.5)
PSA SERPL-MCNC: 6.45 NG/ML (ref 0–4)
RBC # BLD AUTO: 4.86 10*6/MM3 (ref 4.14–5.8)
SODIUM SERPL-SCNC: 143 MMOL/L (ref 136–145)
T3FREE SERPL-MCNC: 3.1 PG/ML (ref 2–4.4)
T4 FREE SERPL-MCNC: 1.46 NG/DL (ref 0.93–1.7)
TRIGL SERPL-MCNC: 98 MG/DL (ref 0–149)
TSH SERPL DL<=0.005 MIU/L-ACNC: 2.66 UIU/ML (ref 0.27–4.2)
WBC # BLD AUTO: 3.28 10*3/MM3 (ref 3.4–10.8)

## 2023-03-29 ENCOUNTER — APPOINTMENT (OUTPATIENT)
Dept: SPEECH THERAPY | Facility: HOSPITAL | Age: 83
End: 2023-03-29
Payer: MEDICARE

## 2023-04-03 ENCOUNTER — APPOINTMENT (OUTPATIENT)
Dept: SPEECH THERAPY | Facility: HOSPITAL | Age: 83
End: 2023-04-03
Payer: MEDICARE

## 2023-04-04 ENCOUNTER — OFFICE VISIT (OUTPATIENT)
Dept: INTERNAL MEDICINE | Facility: CLINIC | Age: 83
End: 2023-04-04
Payer: MEDICARE

## 2023-04-04 VITALS
SYSTOLIC BLOOD PRESSURE: 124 MMHG | BODY MASS INDEX: 23.11 KG/M2 | OXYGEN SATURATION: 100 % | HEIGHT: 69 IN | HEART RATE: 64 BPM | RESPIRATION RATE: 16 BRPM | DIASTOLIC BLOOD PRESSURE: 70 MMHG | WEIGHT: 156 LBS | TEMPERATURE: 97.1 F

## 2023-04-04 DIAGNOSIS — G31.84 MILD COGNITIVE IMPAIRMENT WITH MEMORY LOSS: Chronic | ICD-10-CM

## 2023-04-04 DIAGNOSIS — M15.9 GENERALIZED OSTEOARTHRITIS OF MULTIPLE SITES: Chronic | ICD-10-CM

## 2023-04-04 DIAGNOSIS — R97.20 ELEVATED PSA: Chronic | ICD-10-CM

## 2023-04-04 DIAGNOSIS — R31.29 MICROSCOPIC HEMATURIA: Chronic | ICD-10-CM

## 2023-04-04 DIAGNOSIS — I25.10 CORONARY ARTERY DISEASE INVOLVING NATIVE CORONARY ARTERY OF NATIVE HEART WITHOUT ANGINA PECTORIS: Chronic | ICD-10-CM

## 2023-04-04 DIAGNOSIS — Z86.16 HISTORY OF 2019 NOVEL CORONAVIRUS DISEASE (COVID-19): Chronic | ICD-10-CM

## 2023-04-04 DIAGNOSIS — Z98.61 HISTORY OF PTCA: Chronic | ICD-10-CM

## 2023-04-04 DIAGNOSIS — Z51.81 THERAPEUTIC DRUG MONITORING: ICD-10-CM

## 2023-04-04 DIAGNOSIS — D69.6 THROMBOCYTOPENIA: Chronic | ICD-10-CM

## 2023-04-04 DIAGNOSIS — E78.2 MIXED HYPERLIPIDEMIA: Primary | Chronic | ICD-10-CM

## 2023-04-04 DIAGNOSIS — I25.2 HISTORY OF MYOCARDIAL INFARCTION: Chronic | ICD-10-CM

## 2023-04-04 DIAGNOSIS — I10 BENIGN ESSENTIAL HYPERTENSION: Chronic | ICD-10-CM

## 2023-04-04 DIAGNOSIS — K21.9 GASTROESOPHAGEAL REFLUX DISEASE WITHOUT ESOPHAGITIS: Chronic | ICD-10-CM

## 2023-04-04 DIAGNOSIS — I65.23 ATHEROSCLEROSIS OF BOTH CAROTID ARTERIES: Chronic | ICD-10-CM

## 2023-04-04 DIAGNOSIS — N40.1 BENIGN NON-NODULAR PROSTATIC HYPERPLASIA WITH LOWER URINARY TRACT SYMPTOMS: Chronic | ICD-10-CM

## 2023-04-04 DIAGNOSIS — Z86.010 HISTORY OF COLON POLYPS: Chronic | ICD-10-CM

## 2023-04-04 DIAGNOSIS — R16.1 SPLENOMEGALY: Chronic | ICD-10-CM

## 2023-04-04 DIAGNOSIS — E55.9 VITAMIN D DEFICIENCY: Chronic | ICD-10-CM

## 2023-04-04 NOTE — PROGRESS NOTES
04/04/2023    Patient Information  Sergey Mendoza                                                                                          [address] [facility] [address]      1940  [unfilled]  There is no work phone number on file.    Chief Complaint:     Follow-up multiple chronic medical problems as noted below.  No new acute complaints.    History of Present Illness:    Patient with a history of several chronic medical problems as listed below in assessment and plan presents today for follow-up with lab prior in order to monitor these chronic medical issues.  Past medical history reviewed and updated were necessary including health maintenance parameters.  This reveals he is currently up-to-date or else accounted for.    Review of Systems   Constitutional: Negative.   HENT: Negative.    Eyes: Negative.    Cardiovascular: Negative.    Respiratory: Negative.    Endocrine: Negative.    Hematologic/Lymphatic: Negative.    Skin: Negative.    Musculoskeletal: Positive for arthritis and joint pain.   Gastrointestinal: Negative.    Genitourinary: Negative.    Neurological: Negative.    Psychiatric/Behavioral: Negative.    Allergic/Immunologic: Negative.        Active Problems:    Patient Active Problem List   Diagnosis   • Benign essential hypertension   • Benign prostatic hypertrophy, 07/07/2008--negative biopsy.   • Carotid artery plaque, 4/26/2021--mild bilateral.  09/10/2018--mild bilateral. 08/11/2016--mild bilateral carotid artery plaque.   • Coronary artery disease involving native coronary artery of native heart without angina pectoris   • Diverticulosis of colon   • Gastroesophageal reflux disease without esophagitis   • Generalized osteoarthritis of multiple sites   • Hyperlipidemia   • History of myocardial infarction, 10/08/2010--patient presented with an anterior myocardial infarction.   • Primary osteoarthritis of right knee   • History of PTCA, 10/08/2010--anterior MI.  EF  40%.  PTCA with drug-eluting stent proximal LAD.  10% LM.  100% diagonal 1.  30% diffuse circumflex.  30% diffuse RCA.  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   • History of 2019 novel coronavirus disease (COVID-19)   • History of colon polyps, 5/31/2022--tubular adenoma x2.         Past Medical History:   Diagnosis Date   • Asymptomatic cholelithiasis 02/19/2018   • Benign essential hypertension 03/24/2006    Diagnosed approximately March 2006.   • Benign prostatic hypertrophy, 07/07/2008--negative biopsy. 07/07/2008    Patient had a history of elevated PSA and found to have benign prostatic hypertrophy.   07/07/2008--negative prostate biopsy.   • Carotid artery plaque, 4/26/2021--mild bilateral.  09/10/2018--mild bilateral. 08/11/2016--mild bilateral carotid artery plaque. 04/21/2009 April 26, 2021--carotid Doppler study reveals mild bilateral carotid plaque.  September 10, 2018--carotid Doppler study reveals mild bilateral carotid artery plaque.  08/11/2016--carotid Doppler study reveals mild bilateral carotid plaque.  05/28/2014--vascular screen reveals mild bilateral carotid plaque, negative for AAA, negative for PAD.   03/30/2011--vascular screen revealed mild bilateral ca   • Diverticulosis of colon 01/09/2012 01/09/2012--normal colonoscopy except for diffuse diverticulosis.   2004--colonoscopy normal.   • Elevated PSA 11/06/2013 02/13/2017--patient seen in follow-up and reports he is followed on a yearly basis by the urologist.  Last follow-up was in December 2016 and his PSA was less than 5.  07/28/2016--PSA elevated at 4.88.  03/19/2014--he was seen in followup and his PSA was back down to 3.9. Patient was not having  any urinary symptoms.  12/11/2013--patient was evaluated by the urologist and he elected to just observe the PSA.   11/06/2013--patient seen in followup in his PSA returned even higher at 5.7. He was referred back to urology.   11/06/2013--PSA returned elevated at 4.3. Patient did have some urinary symptoms consisting of periods of weak stream and dribbling. He had never been treated for chronic prostatitis. I treated him with ciprofloxacin 500 mg for 30 days.       • Gastroesophageal reflux disease without esophagitis 03/24/2016   • Generalized osteoarthritis of multiple sites 03/24/2016   • History of 2019 novel coronavirus disease (COVID-19) 11/30/2021 November 24, 2021   • History of cardiac catheterization 10/08/2010    10/08/2010--patient presented with an anterior myocardial infarction. Heart catheterization revealed anterior/apical hypokinesis with 40% ejection fraction. Severe 80% ostial LAD stenosis. Mild 10% left main. Mild diffuse LAD. Totally occluded diagonal one. 30% diffuse circumflex. Mild 30% percent diffuse RCA. Angioplasty with drug eluding stent performed to the proximal LAD.    • History of Chronic Duodenitis 01/09/2012 01/09/2012--EGD revealed mild distal erythema of the stomach and proximal duodenum.   • History of closed Colles' fracture of left radius 05[birth year]05/1953    13 years of age.   • History of colon polyps, 5/31/2022--tubular adenoma x2. 5/31/2022    May 31, 2022--colonoscopy revealed tubular adenoma x2.  Proximal and ascending colon.   • History of colon polyps, 5/31/2022--tubular adenoma x2. 5/31/2022    May 31, 2022--colonoscopy revealed tubular adenoma x2.  Proximal and ascending colon.   • History of iron deficiency anemia 06/23/2012 02/07/2017--hemoglobin normal at 14.2, hematocrit normal at 45.1.  Serum iron normal at 105.  Iron saturation normal at 30%.  Resolve this issue.  08/02/2016--patient seen in follow-up.  White count is low at 3.66.  Hemoglobin low at 13.3.   Hematocrit normal at 41.8.  Platelets low at 109.  Homocystine and methylmalonic acid are normal.  Serum iron normal at 98.  Iron saturation normal at 28.  Patient is taking iron sulfate 325 mg daily.  10/13/2013--CBC was entirely normal, anemia resolved we'll continue to monitor.  06/23/2012--patient treated for iron deficiency anemia caused by duodenitis which was revealed per EGD 01/09/2012.    • History of myocardial infarction, 10/08/2010--patient presented with an anterior myocardial infarction. 10/08/2010    Stress test 02/23/2011--mild anterior wall ischemia, ejection fraction improved to 64%.  10/08/2010--patient presented with an anterior myocardial infarction. Heart catheterization revealed anterior/apical hypokinesis with 40% ejection fraction. Severe 80% ostial LAD stenosis. Mild 10% left main. Mild diffuse LAD. Totally occluded diagonal one. 30% diffuse circumflex. Mild 30% percent diffuse RCA. Angioplasty with drug eluding stent performed to the proximal LAD.    • History of PTCA, 10/08/2010--anterior MI.  EF 40%.  PTCA with drug-eluting stent proximal LAD.  10% LM.  100% diagonal 1.  30% diffuse circumflex.  30% diffuse RCA.  2011--EF 64% 10/08/2010    Stress test 02/23/2011--mild anterior wall ischemia, ejection fraction improved to 64%.  10/08/2010--patient presented with an anterior myocardial infarction. Heart catheterization revealed anterior/apical hypokinesis with 40% ejection fraction. Severe 80% ostial LAD stenosis. Mild 10% left main. Mild diffuse LAD. Totally occluded diagonal one. 30% diffuse circumflex. Mild 30% percent diffuse RCA. Angioplasty with drug eluding stent performed to the proximal LAD.    • History of Tear of medial meniscus of right knee 10/17/2014    12/09/2014--patient was evaluated by the orthopedist. He felt that patient also had end stage osteoarthritis the right knee and will eventually need a total knee replacement. In the meantime he gave a cortisone injection in  the knee and patient reports this did help. Patient also went to physical therapy which seemed to help somewhat also.   10/29/2014--MRI of the right knee reveals tricompartmental osteoarthritis that is severe in the medial compartment. There is a degenerative tear of the medial meniscus. There is a joint effusion with Baker's cyst. Intramuscular extension of the Baker's cyst into the medial head of the gastrocnemius muscle. Patient referred to orthopedics.   10/17/2014--x-ray of the right knee reveals a small suprapatellar effusion. Narrowing of the medial compartment. There is patellar spur formation. No fracture, dislocation, bone lesion is demonstrated.   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 He was trying to sit down and was in an awkward position.    • Hyperlipidemia 03/06/2011 03/16/2011--treatment for hyperlipidemia and initiated.   • Internal hemorrhoids 03/24/2016   • Microscopic hematuria 02/19/2018 12/20/2017--patient was evaluated by the urologist for a routine follow-up elevated PSA.  He was noted to have microscopic hematuria with 3-5 RBCs.  Cystoscopy was reportedly negative.  Patient also had a CT scan of the abdomen and pelvis which revealed asymptomatic cholelithiasis, diffuse atherosclerosis, and enlarged prostate.   • Mild cognitive impairment with memory loss 3/16/2020    January 13, 2023--patient seen in follow-up and I reviewed the neuropsychological testing.  He would like to go on medication to try to improve or slow up loss of memory and I think this is certainly reasonable.  Plan is as follows: Trial of Aricept with slow titration.  And in the future we will plan on adding Namenda once he is on Aricept 10 mg/day.  Patient has a follow-up appointment in March    • Multiple actinic keratoses 08/02/2016 08/02/2016--patient presents with multiple actinic keratoses as well as seborrheic keratoses.  Dermatology referral given.   • Occlusive coronary artery  disease, 10/08/2010--anterior MI.  EF 40%.  PTCA with drug-eluting stent proximal LAD.  10% LM.  100% diagonal 1.  30% diffuse circumflex.  30% diffuse RCA. 10/08/2010    02/23/2011--stress Cardiolite reveals mild anterior wall ischemia, ejection fraction improved to 64%.  10/08/2010--patient presented with an anterior myocardial infarction. Heart catheterization revealed anterior/apical hypokinesis with 40% ejection fraction. Severe 80% ostial LAD stenosis. Mild 10% left main. Mild diffuse LAD. Totally occluded diagonal one. 30% diffuse circumflex. Mild 30% percent diffuse RCA. Angioplasty with drug eluding stent performed to the proximal LAD.   10/08/2010--anterior MI.  EF 40%.  PTCA with drug-eluting stent proximal LAD.  10% LM.  100% diagonal 1.  30% diffuse circumflex.  30% diffuse RCA.   • Primary osteoarthritis of right knee 10/17/2014    12/09/2014--patient was evaluated by the orthopedist. He felt that patient also had end stage osteoarthritis the right knee and will eventually need a total knee replacement. In the meantime he gave a cortisone injection in the knee and patient reports this did help. Patient also went to physical therapy which seemed to help somewhat also.  10/29/2014--MRI of the right knee reveals tricompartmental osteoarthritis that is severe in the medial compartment. There is a degenerative tear of the medial meniscus. There is a joint effusion with Baker's cyst. Intramuscular extension of the Baker's cyst into the medial head of the gastrocnemius muscle. Patient referred to orthopedics.   10/17/2014--x-ray of the right knee reveals a small suprapatellar effusion. Narrowing of the medial compartment. There is patellar spur formation. No fracture, dislocation, bone lesion is demonstrated.   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 He was trying to sit down and was in an awkward position. T   • Splenomegaly 08/02/2016 09/06/2013--CT scan reveals mild  splenomegaly.  Although not in the report, the radiologist did compare the CT scan from 09/06/2013 to the one from 2010.  The spleen actually looks smaller in size, 13.5 cm compared to 15 cm previously.  In another direction, spleen is 20 cm as compared to 24 cm.  The last dimension was the maximum length.  08/04/2010--initial evaluation by the hematologist. WBCs 3.5. Differential normal. Absolute neutrophil count 2500. Hemoglobin normal at 14.2. MCV low at 83. Platelet count low at 125. IPF normal at 4.6%. Review of old labs through 1997 revealed a white blood cell count has fluctuated between 2.8 and 3.9. There is no trend downward but it is fluctuating. Platelet count has ranged between 148 and 198. MCV has been low. Liver spleen scan revealed mild splenomegaly. B12, folic acid, iron studies, rheumatoid factor, SPEP have all been normal. Felt to be mild pancytopenia secondary to splenomegaly. Patient is followed on a regular basis by the hematologist.   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 03/24/2021   • Vitamin D deficiency 03/24/2016         Past Surgical History:   Procedure Laterality Date   • CARDIAC CATHETERIZATION  10/08/2010    See past medical history   • COLONOSCOPY  01/09/2012 01/09/2012--normal colonoscopy except for diffuse diverticulosis.    • COLONOSCOPY  2004 2004--colonoscopy normal.   • COLONOSCOPY N/A 5/31/2022    Procedure: COLONOSCOPY to cecum into TI with hot snare polypectomies;  Surgeon: Raj Lopez MD;  Location: [facility] ENDOSCOPY;  Service: General;  Laterality: N/A;  pre: positive cologuard  post: polyps, diverticulosis, heomorrhoids   • CORONARY ANGIOPLASTY WITH STENT PLACEMENT  10/10/2010    10/10/2010--critical 80% proximal LAD stenosis treated using a drug-coated stent. Unable to cross diagonal one.   • ESOPHAGOSCOPY / EGD  01/09/2012 01/09/2012--EGD performed for iron deficiency anemia revealed mild erythema of the distal stomach and proximal  "duodenum consistent with duodenitis.   • PROSTATE BIOPSY  07/07/2008 07/07/2008--negative prostate biopsy.  Patient had a history of elevated PSA and found to have benign prostatic hypertrophy.          Allergies   Allergen Reactions   • Penicillins    • Sulfa Antibiotics            Current Outpatient Medications:   •  aspirin 81 MG tablet, Take 1 tablet by mouth Daily., Disp: , Rfl:   •  Coenzyme Q10 (COQ10) 400 MG capsule, Take 1 capsule by mouth daily. Take with a meal., Disp: , Rfl:   •  metoprolol succinate XL (TOPROL-XL) 50 MG 24 hr tablet, Take 1 p.o. daily for blood pressure and heart, Disp: 90 tablet, Rfl: 3  •  nystatin-triamcinolone (MYCOLOG II) 420897-6.1 UNIT/GM-% cream, Apply to affected area twice daily as directed, Disp: 30 g, Rfl: 1  •  rosuvastatin (CRESTOR) 20 MG tablet, Take 1 p.o. daily for high cholesterol, Disp: 90 tablet, Rfl: 3  •  vitamin D3 125 MCG (5000 UT) capsule capsule, 1 by mouth daily as directed, Disp: 30 capsule, Rfl:       Family History   Problem Relation Age of Onset   • Coronary artery disease Mother    • Stroke Mother    • No Known Problems Father    • Cancer Maternal Grandmother    • Heart attack Maternal Grandfather    • Heart disease Maternal Grandfather    • No Known Problems Paternal Grandmother    • No Known Problems Paternal Grandfather          Social History     Socioeconomic History   • Marital status:    Tobacco Use   • Smoking status: Former   • Smokeless tobacco: Never   • Tobacco comments:     Stopped smoking in his early 20s.   Vaping Use   • Vaping Use: Never used   Substance and Sexual Activity   • Alcohol use: Yes     Alcohol/week: 1.0 standard drink     Types: 1 Glasses of wine per week     Comment: Socially   • Drug use: No   • Sexual activity: Yes     Partners: Female         Vitals:    04/04/23 1136   BP: 124/70   Pulse: 64   Resp: 16   Temp: 97.1 °F (36.2 °C)   SpO2: 100%   Weight: 70.8 kg (156 lb)   Height: 175.3 cm (69\")        Body mass " index is 23.04 kg/m².      Physical Exam:    General: Alert and oriented x 3.  No acute distress.  Normal affect.  HEENT: Pupils equal, round, reactive to light; extraocular movements intact; sclerae nonicteric; pharynx, ear canals and TMs normal.  Neck: Without JVD, thyromegaly, bruit, or adenopathy.  Lungs: Clear to auscultation in all fields.  Heart: Regular rate and rhythm without murmur, rub, gallop, or click.  Abdomen: Soft, nontender, without hepatosplenomegaly or hernia.  Bowel sounds normal.  : Deferred.  Rectal: Deferred.  Extremities: Without clubbing, cyanosis, edema, or pulse deficit.  Neurologic: Intact without focal deficit.  Normal station and gait observed during ingress and egress from the examination room.  Skin: Without significant lesion.  Musculoskeletal: Unremarkable.    Lab/other results:    CBC is normal except white count a little low at 3.28 and platelets low 124.  CPK normal at 29.  CMP normal except bilirubin elevated 1.3.  Total cholesterol 132, triglycerides 98, LDL particle #751, small LDL particle #451.  HDL particle number normal at 32.5.  Vitamin D normal at 79.3.  Thyroid function tests are normal.  PSA elevated at 6.45.    Assessment/Plan:     Diagnosis Plan   1. Hyperlipidemia  CK    Comprehensive Metabolic Panel    NMR LipoProfile    TSH    T4, Free    T3, Free      2. Benign essential hypertension        3. Coronary artery disease involving native coronary artery of native heart without angina pectoris        4. History of myocardial infarction, 10/08/2010--patient presented with an anterior myocardial infarction.        5. History of PTCA, 10/08/2010--anterior MI.  EF 40%.  PTCA with drug-eluting stent proximal LAD.  10% LM.  100% diagonal 1.  30% diffuse circumflex.  30% diffuse RCA.  2011--EF 64%        6. Vitamin D deficiency  Vitamin D,25-Hydroxy      7. Elevated PSA  PSA DIAGNOSTIC      8. Benign prostatic hypertrophy, 07/07/2008--negative biopsy.  PSA DIAGNOSTIC       9. Carotid artery plaque, 4/26/2021--mild bilateral.  09/10/2018--mild bilateral. 08/11/2016--mild bilateral carotid artery plaque.        10. Gastroesophageal reflux disease without esophagitis        11. Generalized osteoarthritis of multiple sites        12. Splenomegaly        13. Microscopic hematuria  Urinalysis With Microscopic If Indicated (No Culture) - Urine, Clean Catch      14. Mild cognitive impairment with memory loss        15. 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        16. History of 2019 novel coronavirus disease (COVID-19)  SARS-CoV-2 Antibodies, Nucleocapsid (Natural Immunity)      17. History of colon polyps, 5/31/2022--tubular adenoma x2.        18. Therapeutic drug monitoring  CBC (No Diff)        Patient has hyperlipidemia which is under good control.  This is very important given his known coronary artery disease which seems to be stable.  He is followed by the cardiology service.  Hypertension appears to be controlled with metoprolol alone.  Vitamin D is therapeutic with supplementation.  He has an elevated PSA which has been evaluated with a negative biopsy and he is followed by the urologist.  He has carotid artery plaque and an order for carotid Doppler study was placed last week.  Esophageal reflux currently not requiring medication.  He has splenomegaly and thrombocytopenia and neutropenia and this has been evaluated by heme/onc, we will continue to monitor closely.  He has a history of COVID-19 infection and we will monitor his COVID antibodies in the future.  He has a history of colon polyps and is up-to-date on his colonoscopy.    Plan is as follows: No change in current medical regimen.  Patient will follow-up in 1 year for his subsequent Medicare wellness visit.  Otherwise he will follow-up as needed.      Procedures

## 2023-04-05 ENCOUNTER — HOSPITAL ENCOUNTER (OUTPATIENT)
Dept: SPEECH THERAPY | Facility: HOSPITAL | Age: 83
Setting detail: THERAPIES SERIES
Discharge: HOME | End: 2023-04-05
Payer: MEDICARE

## 2023-04-05 DIAGNOSIS — R41.841 COGNITIVE COMMUNICATION DEFICIT: ICD-10-CM

## 2023-04-05 DIAGNOSIS — G31.84 MILD COGNITIVE IMPAIRMENT WITH MEMORY LOSS: Primary | ICD-10-CM

## 2023-04-05 PROCEDURE — 97130 THER IVNTJ EA ADDL 15 MIN: CPT

## 2023-04-05 PROCEDURE — 97129 THER IVNTJ 1ST 15 MIN: CPT

## 2023-04-05 NOTE — THERAPY TREATMENT NOTE
Outpatient Speech Language Pathology   Adult Speech Language Cognitive Treatment Note  [facility]     Patient Name: Sergey Mendoza  : 1940  MRN: [id number]  Today's Date: 2023         Visit Date: 2023   Patient Active Problem List   Diagnosis   • Benign essential hypertension   • Benign prostatic hypertrophy, 2008--negative biopsy.   • Carotid artery plaque, 2021--mild bilateral.  09/10/2018--mild bilateral. 2016--mild bilateral carotid artery plaque.   • Coronary artery disease involving native coronary artery of native heart without angina pectoris   • Diverticulosis of colon   • Gastroesophageal reflux disease without esophagitis   • Generalized osteoarthritis of multiple sites   • Hyperlipidemia   • History of myocardial infarction, 10/08/2010--patient presented with an anterior myocardial infarction.   • Primary osteoarthritis of right knee   • History of PTCA, 10/08/2010--anterior MI.  EF 40%.  PTCA with drug-eluting stent proximal LAD.  10% LM.  100% diagonal 1.  30% diffuse circumflex.  30% diffuse RCA.  --EF 64%   • Vitamin D deficiency   • Elevated PSA   • Internal hemorrhoids   • Therapeutic drug monitoring   • Splenomegaly   • Multiple actinic keratoses   • Asymptomatic cholelithiasis   • Microscopic hematuria   • Mild cognitive impairment with memory loss   • Thrombocytopenia (CMS/HCC), related to splenomegaly.   • History of 2019 novel coronavirus disease (COVID-19)   • History of colon polyps, 2022--tubular adenoma x2.          Visit Dx:    ICD-10-CM ICD-9-CM   1. Mild cognitive impairment with memory loss  G31.84 331.83   2. Cognitive communication deficit  R41.841 799.52                              SLP OP Goals     Row Name 23 1300          Subjective Comments    Subjective Comments Mr Mendoza reports that name strategy seems to have helped. He reports that he tends to avoid praying in public because his mind can sometimes go blank when he starts  praying.  -BB        Memory Goals    Patient’s memory skills will be enhanced as reported by patient by using external memory aides 80%:  -BB     Status: Patient’s memory skills will be enhanced as reported by patient by using external memory aides Progressing as expected  -BB     Comments: Patient’s memory skills will be enhanced as reported by patient by using external memory aides Training/education provided regarding use of external memory aid with prayer (eg, establish routine, intentional selection of topic, demarcation of topical boundary, mental visual/review prior to task, external prompt in view during completion of task): mod A for role play and identification of patterns/routine and potential barriers for success.  -BB           User Key  (r) = Recorded By, (t) = Taken By, (c) = Cosigned By    Initials Name Provider Type    Easton Crenshaw SLP Speech and Language Pathologist                       Time Calculation:   SLP Start Time: 1020  SLP Stop Time: 1105  SLP Time Calculation (min): 45 min  Total Timed Code Minutes- SLP: 45 minute(s)  Timed Charges  46413-FD Dev of Cogn Skills Initial Minutes: 15  99810-BA Dev of Cogn Skills Add Minutes: 30  Total Minutes  Timed Charges Total Minutes: 45   Total Minutes: 45    Therapy Charges for Today     Code Description Service Date Service Provider Modifiers Qty    85847611078 HC ST DEV OF COGN SKILLS INITIAL 15 MIN 4/5/2023 Easton Cronin, RADHIKA  1    [id number] HC ST DEV OF COGN SKILLS EACH ADDT'L 15 MIN 4/5/2023 Easton Cronin, SLP  2                   RADHIKA Gonzalez  4/5/2023

## 2023-04-10 ENCOUNTER — APPOINTMENT (OUTPATIENT)
Dept: SPEECH THERAPY | Facility: HOSPITAL | Age: 83
End: 2023-04-10
Payer: MEDICARE

## 2023-04-12 ENCOUNTER — HOSPITAL ENCOUNTER (OUTPATIENT)
Dept: SPEECH THERAPY | Facility: HOSPITAL | Age: 83
Setting detail: THERAPIES SERIES
Discharge: HOME | End: 2023-04-12
Payer: MEDICARE

## 2023-04-12 DIAGNOSIS — R41.841 COGNITIVE COMMUNICATION DEFICIT: ICD-10-CM

## 2023-04-12 DIAGNOSIS — G31.84 MILD COGNITIVE IMPAIRMENT WITH MEMORY LOSS: Primary | ICD-10-CM

## 2023-04-12 PROCEDURE — 97130 THER IVNTJ EA ADDL 15 MIN: CPT

## 2023-04-12 PROCEDURE — 97129 THER IVNTJ 1ST 15 MIN: CPT

## 2023-04-12 NOTE — THERAPY PROGRESS REPORT/RE-CERT
Outpatient Speech Language Pathology   Adult Speech Language Cognitive Progress Note  [facility]     Patient Name: Sergey Mendoza  : 1940  MRN: [id number]  Today's Date: 2023         Visit Date: 2023   Patient Active Problem List   Diagnosis   • Benign essential hypertension   • Benign prostatic hypertrophy, 2008--negative biopsy.   • Carotid artery plaque, 2021--mild bilateral.  09/10/2018--mild bilateral. 2016--mild bilateral carotid artery plaque.   • Coronary artery disease involving native coronary artery of native heart without angina pectoris   • Diverticulosis of colon   • Gastroesophageal reflux disease without esophagitis   • Generalized osteoarthritis of multiple sites   • Hyperlipidemia   • History of myocardial infarction, 10/08/2010--patient presented with an anterior myocardial infarction.   • Primary osteoarthritis of right knee   • History of PTCA, 10/08/2010--anterior MI.  EF 40%.  PTCA with drug-eluting stent proximal LAD.  10% LM.  100% diagonal 1.  30% diffuse circumflex.  30% diffuse RCA.  --EF 64%   • Vitamin D deficiency   • Elevated PSA   • Internal hemorrhoids   • Therapeutic drug monitoring   • Splenomegaly   • Multiple actinic keratoses   • Asymptomatic cholelithiasis   • Microscopic hematuria   • Mild cognitive impairment with memory loss   • Thrombocytopenia (CMS/HCC), related to splenomegaly.   • History of 2019 novel coronavirus disease (COVID-19)   • History of colon polyps, 2022--tubular adenoma x2.          Visit Dx:    ICD-10-CM ICD-9-CM   1. Mild cognitive impairment with memory loss  G31.84 331.83   2. Cognitive communication deficit  R41.841 799.52                              SLP OP Goals     Row Name 23 1100          Subjective Comments    Subjective Comments Mr Mendoza reports that he continues to have trouble with names. 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  -BB         Memory Goals    Memory LTG's Patient and family will implement compensatory strategies to maximize patient’s Memory function so patient can continue to participate in daily activities;Patient will be able to remember  information needed to participate in activities of daily living  -BB     Patient and family will implement compensatory strategies to maximize patient’s Memory function so patient can continue to participate in daily activities 80%:  -BB     Status: Patient and family will implement compensatory strategies to maximize patient’s Memory function so patient can continue to participate in daily activities Progressing as expected  -BB     Comments: Patient and family will implement compensatory strategies to maximize patient’s Memory function so patient can continue to participate in daily activities Delayed recall activity after brief distraction: 60% acc wo cues and 100% acc w mod A. Training/education regarding internal strategies (eg, visualization, association, intentionality)  -BB     Patient will be able to remember  information needed to participate in activities of daily living Recall of tasks at home: pt reports frequently forgetting to do tasks at home such as retrieving items from another room or completing requests that his wife has of him.  -BB     Status: Patient will be able to remember  information needed to participate in activities of daily living Progressing as expected  -BB     Comments: Patient will be able to remember  information needed to participate in activities of daily living Discussion regarding ways to generalize internal memory strategies when pt is being given auditory information at home (ie, instruction from his wife).  -BB     Memory STG's Patient’s memory skills will be enhanced as reported by patient by utilizing internal memory strategies to recall up to 3 pieces of information after a 5- minute delay;Patient’s memory skills will be enhanced as reported by patient  "by using external memory aides  -BB     Patient’s memory skills will be enhanced as reported by patient by utilizing internal memory strategies to recall up to 3 pieces of information after a 5- minute delay 80%:  -BB     Status: Patient’s memory skills will be enhanced as reported by patient by utilizing internal memory strategies to recall up to 3 pieces of information after a 5- minute delay Progressing as expected  -BB     Comments: Patient’s memory skills will be enhanced as reported by patient by utilizing internal memory strategies to recall up to 3 pieces of information after a 5- minute delay Delayed recall activity after brief distraction: 60% acc wo cues and 100% acc w mod A.  -BB     Patient’s memory skills will be enhanced as reported by patient by using external memory aides 80%:  -BB     Status: Patient’s memory skills will be enhanced as reported by patient by using external memory aides Progressing as expected  -BB     Comments: Patient’s memory skills will be enhanced as reported by patient by using external memory aides Completed 4/5/23: \"Training/education provided regarding use of external memory aid with prayer (eg, establish routine, intentional selection of topic, demarcation of topical boundary, mental visual/review prior to task, external prompt in view during completion of task): mod A for role play and identification of patterns/routine and potential barriers for success.\"  -BB        Attention/Orientation Goals    Attention/Orientation LTG's Patient will be able to safely perform ADLs  -BB     Patient will be able to safely perform ADLs With Supervision  -BB     Status: Patient will be able to safely perform ADLs Progressing as expected  -BB     Comments:Patient will be able to safely perform ADLs Completed 3/27/23: \"Verbal problem solving task (eg, identifying attention/recall breakdowns during avocational tasks and solutions to improve attention/recall recall tasks with " "attention/recall strategies): min A for identifying possible attention/recall breakdowns and identifying possible solutions/strategies. Training/education provided regarding attention/recall strategies, including \"pre-teaching\", intention, association. Pt verbalized understanding.\"  -BB     Attention/Orientation STG's Patient will improve attention skills by sustaining focus and participation to conversation/task  -BB     Patient will improve attention skills by sustaining focus and participation to conversation/task 10 minute task  -BB     Status: Patient will improve attention skills by sustaining focus and participation to conversation/task New  -BB     Comments: Patient will improve attention skills by sustaining focus and participation to conversation/task Pending  -BB           User Key  (r) = Recorded By, (t) = Taken By, (c) = Cosigned By    Initials Name Provider Type    Easton Crenshaw SLP Speech and Language Pathologist                  Patient is improving and has potential to improve.      Time Calculation:   SLP Start Time: 1015  SLP Stop Time: 1100  SLP Time Calculation (min): 45 min  Total Timed Code Minutes- SLP: 45 minute(s)  Timed Charges  54160-CH Dev of Cogn Skills Initial Minutes: 15  01812-QB Dev of Cogn Skills Add Minutes: 30  Total Minutes  Timed Charges Total Minutes: 45   Total Minutes: 45    Therapy Charges for Today     Code Description Service Date Service Provider Modifiers Qty    52873692664 HC ST DEV OF COGN SKILLS INITIAL 15 MIN 4/12/2023 Easton Cronin, RADHIKA  1    16833330806 HC ST DEV OF COGN SKILLS EACH ADDT'L 15 MIN 4/12/2023 Easton Cronin, RADHIKA  2                   RADHIKA Gonzalez  4/12/2023  "

## 2023-04-17 ENCOUNTER — APPOINTMENT (OUTPATIENT)
Dept: SPEECH THERAPY | Facility: HOSPITAL | Age: 83
End: 2023-04-17
Payer: MEDICARE

## 2023-04-19 ENCOUNTER — HOSPITAL ENCOUNTER (OUTPATIENT)
Dept: SPEECH THERAPY | Facility: HOSPITAL | Age: 83
Setting detail: THERAPIES SERIES
Discharge: HOME | End: 2023-04-19
Payer: MEDICARE

## 2023-04-19 DIAGNOSIS — G31.84 MILD COGNITIVE IMPAIRMENT WITH MEMORY LOSS: Primary | ICD-10-CM

## 2023-04-19 DIAGNOSIS — R41.841 COGNITIVE COMMUNICATION DEFICIT: ICD-10-CM

## 2023-04-19 PROCEDURE — 97129 THER IVNTJ 1ST 15 MIN: CPT

## 2023-04-19 PROCEDURE — 97130 THER IVNTJ EA ADDL 15 MIN: CPT

## 2023-04-19 NOTE — THERAPY TREATMENT NOTE
Outpatient Speech Language Pathology   Adult Speech Language Cognitive Progress Note  [facility]     Patient Name: Sergey Mendoza  : 1940  MRN: [id number]  Today's Date: 2023         Visit Date: 2023   Patient Active Problem List   Diagnosis   • Benign essential hypertension   • Benign prostatic hypertrophy, 2008--negative biopsy.   • Carotid artery plaque, 2021--mild bilateral.  09/10/2018--mild bilateral. 2016--mild bilateral carotid artery plaque.   • Coronary artery disease involving native coronary artery of native heart without angina pectoris   • Diverticulosis of colon   • Gastroesophageal reflux disease without esophagitis   • Generalized osteoarthritis of multiple sites   • Hyperlipidemia   • History of myocardial infarction, 10/08/2010--patient presented with an anterior myocardial infarction.   • Primary osteoarthritis of right knee   • History of PTCA, 10/08/2010--anterior MI.  EF 40%.  PTCA with drug-eluting stent proximal LAD.  10% LM.  100% diagonal 1.  30% diffuse circumflex.  30% diffuse RCA.  --EF 64%   • Vitamin D deficiency   • Elevated PSA   • Internal hemorrhoids   • Therapeutic drug monitoring   • Splenomegaly   • Multiple actinic keratoses   • Asymptomatic cholelithiasis   • Microscopic hematuria   • Mild cognitive impairment with memory loss   • Thrombocytopenia (CMS/HCC), related to splenomegaly.   • History of 2019 novel coronavirus disease (COVID-19)   • History of colon polyps, 2022--tubular adenoma x2.          Visit Dx:    ICD-10-CM ICD-9-CM   1. Mild cognitive impairment with memory loss  G31.84 331.83   2. Cognitive communication deficit  R41.841 799.52                              SLP OP Goals     Row Name 23 1100          Subjective Comments    Subjective Comments Mr Mendoza reports that he is trying to implement association strategies with names during Bible study, however he continues to have some trouble when recalling names.   -BB        Memory Goals    Memory LTG's Patient and family will implement compensatory strategies to maximize patient’s Memory function so patient can continue to participate in daily activities;Patient will be able to remember  information needed to participate in activities of daily living  -BB     Patient and family will implement compensatory strategies to maximize patient’s Memory function so patient can continue to participate in daily activities 80%:  -BB     Status: Patient and family will implement compensatory strategies to maximize patient’s Memory function so patient can continue to participate in daily activities Progressing as expected  -BB     Comments: Patient and family will implement compensatory strategies to maximize patient’s Memory function so patient can continue to participate in daily activities Delayed recall of main ideas after reading 5 sentence passage: 100% acc. Pt cued to keep passage near him in case he may need to reference as external memory strategy. Pt education/training regarding importance of planing ahead by taking important information with him in case he needs assistance with recall from external aid. Pt verbalized understanding and in agreement with plan. Delayed recall of 5 names/faces with external memory strategy and internal strategies (association): 80% acc w min cues wo external aid. Pt's procedural memory and prospective memory are improving, however he continues to benefit from additional exercise to improve procedural and prospective memory (with role play tasks)  -BB     Patient will be able to remember  information needed to participate in activities of daily living Recall of tasks at home: pt reports frequently forgetting to do tasks at home such as retrieving items from another room or completing requests that his wife has of him.  -BB     Status: Patient will be able to remember  information needed to participate in activities of daily living Progressing as  expected  -BB     Comments: Patient will be able to remember  information needed to participate in activities of daily living Pt provided with pocket memory book for recall of lists and names/places.  -BB     Memory STG's Patient’s memory skills will be enhanced as reported by patient by utilizing internal memory strategies to recall up to 3 pieces of information after a 5- minute delay;Patient’s memory skills will be enhanced as reported by patient by using external memory aides  -BB     Patient’s memory skills will be enhanced as reported by patient by utilizing internal memory strategies to recall up to 3 pieces of information after a 5- minute delay 80%:  -BB     Status: Patient’s memory skills will be enhanced as reported by patient by utilizing internal memory strategies to recall up to 3 pieces of information after a 5- minute delay Progressing as expected  -BB     Comments: Patient’s memory skills will be enhanced as reported by patient by utilizing internal memory strategies to recall up to 3 pieces of information after a 5- minute delay Delayed recall of main ideas after reading 5 sentence passage: 100% acc. Pt cued to keep passage near him in case he may need to reference as external memory strategy. Pt education/training regarding importance of planing ahead by taking important information with him in case he needs assistance with recall from external aid. Pt verbalized understanding and in agreement with plan. Delayed recall of 5 names/faces with external memory strategy and internal strategies (association): 80% acc w min cues wo external aid.  -BB     Patient’s memory skills will be enhanced as reported by patient by using external memory aides 80%:  -BB     Status: Patient’s memory skills will be enhanced as reported by patient by using external memory aides Progressing as expected  -BB     Comments: Patient’s memory skills will be enhanced as reported by patient by using external memory aides --  Pt  "provided with pocket memory book for recall of lists and names/places.  -BB        Attention/Orientation Goals    Attention/Orientation LTG's Patient will be able to safely perform ADLs  -BB     Patient will be able to safely perform ADLs With Supervision  -BB     Status: Patient will be able to safely perform ADLs Progressing as expected  -BB     Comments:Patient will be able to safely perform ADLs Completed 3/27/23: \"Verbal problem solving task (eg, identifying attention/recall breakdowns during avocational tasks and solutions to improve attention/recall recall tasks with attention/recall strategies): min A for identifying possible attention/recall breakdowns and identifying possible solutions/strategies. Training/education provided regarding attention/recall strategies, including \"pre-teaching\", intention, association. Pt verbalized understanding.\"  -BB     Attention/Orientation STG's Patient will improve attention skills by sustaining focus and participation to conversation/task  -BB     Patient will improve attention skills by sustaining focus and participation to conversation/task 10 minute task  -BB     Status: Patient will improve attention skills by sustaining focus and participation to conversation/task New  -BB     Comments: Patient will improve attention skills by sustaining focus and participation to conversation/task Pending  -BB           User Key  (r) = Recorded By, (t) = Taken By, (c) = Cosigned By    Initials Name Provider Type    Easton Crenshaw SLP Speech and Language Pathologist                       Time Calculation:   SLP Start Time: 1015  SLP Stop Time: 1100  SLP Time Calculation (min): 45 min  Timed Charges  75500-UJ Dev of Cogn Skills Initial Minutes: 15  12887-GZ Dev of Cogn Skills Add Minutes: 30  Total Minutes  Timed Charges Total Minutes: 45   Total Minutes: 45    Therapy Charges for Today     Code Description Service Date Service Provider Modifiers Qty    20135425207  ST DEV OF COGN " SKILLS INITIAL 15 MIN 4/19/2023 Easton Cronin, SLP  1    [id number]  ST DEV OF COGN SKILLS EACH ADDT'L 15 MIN 4/19/2023 Easton Cronin, SLP  2                   Easton Cronin, SLP  4/19/2023

## 2023-04-24 ENCOUNTER — APPOINTMENT (OUTPATIENT)
Dept: SPEECH THERAPY | Facility: HOSPITAL | Age: 83
End: 2023-04-24
Payer: MEDICARE

## 2023-04-26 ENCOUNTER — HOSPITAL ENCOUNTER (OUTPATIENT)
Dept: SPEECH THERAPY | Facility: HOSPITAL | Age: 83
Setting detail: THERAPIES SERIES
Discharge: HOME | End: 2023-04-26
Payer: MEDICARE

## 2023-04-26 DIAGNOSIS — R41.841 COGNITIVE COMMUNICATION DEFICIT: ICD-10-CM

## 2023-04-26 DIAGNOSIS — G31.84 MILD COGNITIVE IMPAIRMENT WITH MEMORY LOSS: Primary | ICD-10-CM

## 2023-04-26 PROCEDURE — 97130 THER IVNTJ EA ADDL 15 MIN: CPT

## 2023-04-26 PROCEDURE — 97129 THER IVNTJ 1ST 15 MIN: CPT

## 2023-04-26 NOTE — THERAPY TREATMENT NOTE
Outpatient Speech Language Pathology   Adult Speech Language Cognitive Treatment Note  [facility]     Patient Name: Sergey Mendoza  : 1940  MRN: [id number]  Today's Date: 2023         Visit Date: 2023   Patient Active Problem List   Diagnosis   • Benign essential hypertension   • Benign prostatic hypertrophy, 2008--negative biopsy.   • Carotid artery plaque, 2021--mild bilateral.  09/10/2018--mild bilateral. 2016--mild bilateral carotid artery plaque.   • Coronary artery disease involving native coronary artery of native heart without angina pectoris   • Diverticulosis of colon   • Gastroesophageal reflux disease without esophagitis   • Generalized osteoarthritis of multiple sites   • Hyperlipidemia   • History of myocardial infarction, 10/08/2010--patient presented with an anterior myocardial infarction.   • Primary osteoarthritis of right knee   • History of PTCA, 10/08/2010--anterior MI.  EF 40%.  PTCA with drug-eluting stent proximal LAD.  10% LM.  100% diagonal 1.  30% diffuse circumflex.  30% diffuse RCA.  --EF 64%   • Vitamin D deficiency   • Elevated PSA   • Internal hemorrhoids   • Therapeutic drug monitoring   • Splenomegaly   • Multiple actinic keratoses   • Asymptomatic cholelithiasis   • Microscopic hematuria   • Mild cognitive impairment with memory loss   • Thrombocytopenia (CMS/HCC), related to splenomegaly.   • History of 2019 novel coronavirus disease (COVID-19)   • History of colon polyps, 2022--tubular adenoma x2.          Visit Dx:    ICD-10-CM ICD-9-CM   1. Mild cognitive impairment with memory loss  G31.84 331.83   2. Cognitive communication deficit  R41.841 799.52                              SLP OP Goals     Row Name 23 1300          Subjective Comments    Subjective Comments Patient reports that he has been using external memory aid. He has written down names with notes, conversation notes, and prayer requests.  -BB        Memory Goals     Patient’s memory skills will be enhanced as reported by patient by using external memory aides 80%:  -BB     Status: Patient’s memory skills will be enhanced as reported by patient by using external memory aides Progressing as expected  -BB     Comments: Patient’s memory skills will be enhanced as reported by patient by using external memory aides Delayed recall of 5 pieces of historical information with cues to utilize external memory strategy (as multimodal learning strategy and prompt as needed): 80% acc w SUP. Training/education regarding organization and use of external memory strategy during or immediately after conversations on the phone and during avocational task.  -BB           User Key  (r) = Recorded By, (t) = Taken By, (c) = Cosigned By    Initials Name Provider Type    Easton Crenshaw, SLP Speech and Language Pathologist                       Time Calculation:   SLP Start Time: 1015  SLP Stop Time: 1100  SLP Time Calculation (min): 45 min  Timed Charges  21388-DK Dev of Cogn Skills Initial Minutes: 15  04994-JE Dev of Cogn Skills Add Minutes: 30  Total Minutes  Timed Charges Total Minutes: 45   Total Minutes: 45    Therapy Charges for Today     Code Description Service Date Service Provider Modifiers Qty    87877467306 HC ST DEV OF COGN SKILLS INITIAL 15 MIN 4/26/2023 Easton Cronin, SLP  1    [id number] HC ST DEV OF COGN SKILLS EACH ADDT'L 15 MIN 4/26/2023 Easton Cronin, SLP  2                   Easton Cronin, SLP  4/26/2023

## 2023-05-03 ENCOUNTER — APPOINTMENT (OUTPATIENT)
Dept: SPEECH THERAPY | Facility: HOSPITAL | Age: 83
End: 2023-05-03
Payer: MEDICARE

## 2023-05-09 ENCOUNTER — TELEPHONE (OUTPATIENT)
Dept: NEUROLOGY | Facility: CLINIC | Age: 83
End: 2023-05-09
Payer: MEDICARE

## 2023-05-10 ENCOUNTER — APPOINTMENT (OUTPATIENT)
Dept: SPEECH THERAPY | Facility: HOSPITAL | Age: 83
End: 2023-05-10
Payer: MEDICARE

## 2023-05-17 ENCOUNTER — APPOINTMENT (OUTPATIENT)
Dept: SPEECH THERAPY | Facility: HOSPITAL | Age: 83
End: 2023-05-17
Payer: MEDICARE

## 2023-05-24 ENCOUNTER — APPOINTMENT (OUTPATIENT)
Dept: SPEECH THERAPY | Facility: HOSPITAL | Age: 83
End: 2023-05-24
Payer: MEDICARE

## 2023-05-30 ENCOUNTER — OFFICE VISIT (OUTPATIENT)
Dept: NEUROLOGY | Facility: CLINIC | Age: 83
End: 2023-05-30

## 2023-05-30 VITALS
OXYGEN SATURATION: 97 % | DIASTOLIC BLOOD PRESSURE: 68 MMHG | HEART RATE: 68 BPM | BODY MASS INDEX: 23.11 KG/M2 | HEIGHT: 69 IN | SYSTOLIC BLOOD PRESSURE: 122 MMHG | WEIGHT: 156 LBS

## 2023-05-30 DIAGNOSIS — G31.84 MILD COGNITIVE IMPAIRMENT: Primary | ICD-10-CM

## 2023-05-30 DIAGNOSIS — R41.3 SHORT-TERM MEMORY LOSS: ICD-10-CM

## 2023-05-30 PROCEDURE — 1159F MED LIST DOCD IN RCRD: CPT | Performed by: PSYCHIATRY & NEUROLOGY

## 2023-05-30 PROCEDURE — 1160F RVW MEDS BY RX/DR IN RCRD: CPT | Performed by: PSYCHIATRY & NEUROLOGY

## 2023-05-30 PROCEDURE — 3074F SYST BP LT 130 MM HG: CPT | Performed by: PSYCHIATRY & NEUROLOGY

## 2023-05-30 PROCEDURE — 3078F DIAST BP <80 MM HG: CPT | Performed by: PSYCHIATRY & NEUROLOGY

## 2023-05-30 PROCEDURE — 99213 OFFICE O/P EST LOW 20 MIN: CPT | Performed by: PSYCHIATRY & NEUROLOGY

## 2023-05-30 RX ORDER — MEMANTINE HYDROCHLORIDE 10 MG/1
10 TABLET ORAL 2 TIMES DAILY
Qty: 60 TABLET | Refills: 5 | Status: SHIPPED | OUTPATIENT
Start: 2023-06-30

## 2023-05-30 RX ORDER — MEMANTINE HYDROCHLORIDE 5 MG/1
TABLET ORAL
Qty: 70 TABLET | Refills: 0 | Status: SHIPPED | OUTPATIENT
Start: 2023-05-30 | End: 2023-06-29

## 2023-05-30 NOTE — PROGRESS NOTES
Chief Complaint   Patient presents with   • Memory Loss       Patient ID: Sergey Mendoza is a 83 y.o. male.    HPI: I had the pleasure of seeing your patient today.  As you may know he is an 83-year-old gentleman here for the management of mild cognitive impairment.  Overall he has not had any drastic change.  However his wife who accompanies him today says that he has had some changes with respect to focus and concentration.  She has noticed that it is difficult for him to multitask.  He does have a specific routine in the morning where he was sit at the kitchen table and work on the finances for the both of them.  This historically has gone well and he seems to do this successfully.  He has had a lot of difficulties with respect to initiating an exercise regimen.  He says that this at least in part is due to chronic left knee pain.  No hallucinations.  He did have trouble using the [employer] wiper's in his car on 1 occasion however his wife says that he was able to do it just fine on the next follow-up occasion.  He was taking Aricept however unfortunately he experienced some issues with that and is no longer taking it.  No interval development of resting tremor or significant changes of gait.      The following portions of the patient's history were reviewed and updated as appropriate: allergies, current medications, past family history, past medical history, past social history, past surgical history and problem list.    Review of Systems   Constitutional: Negative for activity change, appetite change, fatigue and unexpected weight change.   HENT: Negative.    Eyes: Negative.    Respiratory: Negative for cough, chest tightness, wheezing and stridor.    Cardiovascular: Negative for chest pain, palpitations and leg swelling.   Gastrointestinal: Negative.    Endocrine: Negative for cold intolerance and heat intolerance.   Genitourinary: Negative for decreased urine volume, difficulty urinating, frequency, penile  pain and urgency.   Musculoskeletal: Positive for neck stiffness. Negative for back pain, gait problem and neck pain.   Skin: Negative for color change, rash and wound.   Allergic/Immunologic: Negative for environmental allergies, food allergies and immunocompromised state.   Neurological: Negative for dizziness, tremors, weakness, light-headedness, numbness and headaches.   Hematological: Does not bruise/bleed easily.   Psychiatric/Behavioral: Positive for agitation, behavioral problems, confusion and decreased concentration. Negative for sleep disturbance. The patient is nervous/anxious. The patient is not hyperactive.       I have reviewed the review of systems above performed by my medical assistant.      Vitals:    05/30/23 1334   BP: 122/68   Pulse: 68   SpO2: 97%       Neurologic Exam     Mental Status   Oriented to person, place, and time.   Concentration: normal.   Level of consciousness: alert  Knowledge: consistent with education (No deficits found.).     Cranial Nerves     CN II   Visual fields full to confrontation.     CN III, IV, VI   Pupils are equal, round, and reactive to light.  Extraocular motions are normal.   CN III: no CN III palsy  CN VI: no CN VI palsy    CN V   Facial sensation intact.     CN VII   Facial expression full, symmetric.     CN VIII   CN VIII normal.     CN IX, X   CN IX normal.   CN X normal.     CN XI   CN XI normal.     CN XII   CN XII normal.     Motor Exam     Strength   Right neck flexion: 5/5  Left neck flexion: 5/5  Right neck extension: 5/5  Left neck extension: 5/5  Right deltoid: 5/5  Left deltoid: 5/5  Right biceps: 5/5  Left biceps: 5/5  Right triceps: 5/5  Left triceps: 5/5  Right wrist flexion: 5/5  Left wrist flexion: 5/5  Right wrist extension: 5/5  Left wrist extension: 5/5  Right interossei: 5/5  Left interossei: 5/5  Right abdominals: 5/5  Left abdominals: 5/5  Right iliopsoas: 5/5  Left iliopsoas: 5/5  Right quadriceps: 5/5  Left quadriceps: 5/5  Right  hamstrin/5  Left hamstrin/5  Right glutei: 5/5  Left glutei: 5/5  Right anterior tibial: 5/5  Left anterior tibial: 5/5  Right posterior tibial: 5/5  Left posterior tibial: 5/5  Right peroneal: 5/5  Left peroneal: 5/5  Right gastroc: 5/5  Left gastroc: 5/5    Sensory Exam   Light touch normal.   Vibration normal.     Gait, Coordination, and Reflexes     Gait  Gait: normal    Reflexes   Right brachioradialis: 2+  Left brachioradialis: 2+  Right biceps: 2+  Left biceps: 2+  Right triceps: 2+  Left triceps: 2+  Right patellar: 2+  Left patellar: 2+  Right achilles: 2+  Left achilles: 2+  Right : 2+  Left : 2+Station is normal.       Physical Exam  Vitals reviewed.   Constitutional:       Appearance: He is well-developed.   HENT:      Head: Normocephalic and atraumatic.   Eyes:      Extraocular Movements: EOM normal.      Pupils: Pupils are equal, round, and reactive to light.   Cardiovascular:      Rate and Rhythm: Normal rate and regular rhythm.   Pulmonary:      Breath sounds: Normal breath sounds.   Musculoskeletal:         General: Normal range of motion.   Skin:     General: Skin is warm.   Neurological:      Mental Status: He is oriented to person, place, and time.      Gait: Gait is intact.      Deep Tendon Reflexes:      Reflex Scores:       Tricep reflexes are 2+ on the right side and 2+ on the left side.       Bicep reflexes are 2+ on the right side and 2+ on the left side.       Brachioradialis reflexes are 2+ on the right side and 2+ on the left side.       Patellar reflexes are 2+ on the right side and 2+ on the left side.       Achilles reflexes are 2+ on the right side and 2+ on the left side.        Procedures    Assessment/Plan: We are going to start the process of Namenda titration to the 10 mg twice daily dosing.  May consider rivastigmine at some point in the future.  We will see him back in approximately 4 months or sooner if needed.  A total of 20 minutes was spent face-to-face  with the patient today.  Of that greater than 50% of this time was spent discussing signs and symptoms of mild cognitive impairment, patient education, plan of care and prognosis.         Diagnoses and all orders for this visit:    1. Mild cognitive impairment (Primary)  -     memantine (NAMENDA) 5 MG tablet; 1 tab daily for 1 week, then 1 tab twice daily for 1 week, then 2 tabs in the morning and 1 tab at night for 1 week, then 2 tabs twice daily  Dispense: 70 tablet; Refill: 0  -     memantine (NAMENDA) 10 MG tablet; Take 1 tablet by mouth 2 (Two) Times a Day.  Dispense: 60 tablet; Refill: 5    2. Short-term memory loss  -     memantine (NAMENDA) 5 MG tablet; 1 tab daily for 1 week, then 1 tab twice daily for 1 week, then 2 tabs in the morning and 1 tab at night for 1 week, then 2 tabs twice daily  Dispense: 70 tablet; Refill: 0  -     memantine (NAMENDA) 10 MG tablet; Take 1 tablet by mouth 2 (Two) Times a Day.  Dispense: 60 tablet; Refill: 5           Mario Ontiveros II, MD

## 2023-05-31 ENCOUNTER — TELEPHONE (OUTPATIENT)
Dept: NEUROLOGY | Facility: CLINIC | Age: 83
End: 2023-05-31

## 2023-05-31 NOTE — TELEPHONE ENCOUNTER
Caller: Jes Mendoza    Relationship: Emergency Contact; SPOUSE    Best call back number: [phone]    What was the call regarding: PT'S WIFE CALLED STATING WHEN SHE PICKED UP PT'S MEMANTINE RX FROM [facility] YESTERDAY, THEY FILLED FOR THE MAINTENANCE DOSAGE OF 10MG (TAKE 1 TABLET TWICE DAILY). SHE STATES DR. JEROME HAD ADVISED PT WAS TO TITRATE UP TO THIS DOSAGE.    I ADVISED PT'S WIFE THAT DR. JEROME SENT 2 SEPARATE RXs FOR MEMANTINE TO [facility] AND THAT SHE SHOULD CONTACT THEM TO ENSURE THAT THEY RECEIVED RX FOR HIS INITIAL DOSE. PT'S WIFE VERBALIZED UNDERSTANDING AND WILL CALL BACK IF OFFICE NEEDS TO RESEND RX WITH THE TITRATION INSTRUCTIONS.    DOCUMENTING PER PROTOCOL.

## 2023-06-07 ENCOUNTER — HOSPITAL ENCOUNTER (OUTPATIENT)
Dept: SPEECH THERAPY | Facility: HOSPITAL | Age: 83
Setting detail: THERAPIES SERIES
Discharge: HOME | End: 2023-06-07
Payer: MEDICARE

## 2023-06-07 DIAGNOSIS — G31.84 MILD COGNITIVE IMPAIRMENT WITH MEMORY LOSS: Primary | ICD-10-CM

## 2023-06-07 DIAGNOSIS — R41.841 COGNITIVE COMMUNICATION DEFICIT: ICD-10-CM

## 2023-06-07 PROCEDURE — 97129 THER IVNTJ 1ST 15 MIN: CPT

## 2023-06-07 PROCEDURE — 97130 THER IVNTJ EA ADDL 15 MIN: CPT

## 2023-06-07 NOTE — THERAPY TREATMENT NOTE
Outpatient Speech Language Pathology   Adult Speech Language Cognitive Treatment Note  [facility]     Patient Name: Sergey Mendoza  : 1940  MRN: [id number]  Today's Date: 2023         Visit Date: 2023   Patient Active Problem List   Diagnosis    Benign essential hypertension    Benign prostatic hypertrophy, 2008--negative biopsy.    Carotid artery plaque, 2021--mild bilateral.  09/10/2018--mild bilateral. 2016--mild bilateral carotid artery plaque.    Coronary artery disease involving native coronary artery of native heart without angina pectoris    Diverticulosis of colon    Gastroesophageal reflux disease without esophagitis    Generalized osteoarthritis of multiple sites    Hyperlipidemia    History of myocardial infarction, 10/08/2010--patient presented with an anterior myocardial infarction.    Primary osteoarthritis of right knee    History of PTCA, 10/08/2010--anterior MI.  EF 40%.  PTCA with drug-eluting stent proximal LAD.  10% LM.  100% diagonal 1.  30% diffuse circumflex.  30% diffuse RCA.  --EF 64%    Vitamin D deficiency    Elevated PSA    Internal hemorrhoids    Therapeutic drug monitoring    Splenomegaly    Multiple actinic keratoses    Asymptomatic cholelithiasis    Microscopic hematuria    Mild cognitive impairment with memory loss    Thrombocytopenia (CMS/HCC), related to splenomegaly.    History of 2019 novel coronavirus disease (COVID-19)    History of colon polyps, 2022--tubular adenoma x2.          Visit Dx:    ICD-10-CM ICD-9-CM   1. Mild cognitive impairment with memory loss  G31.84 331.83   2. Cognitive communication deficit  R41.841 799.52                              SLP OP Goals       Row Name 23 1100          Subjective Comments    Subjective Comments Patient reports that he feels like he is doing well but then notices that he forgets things; he feels like he continues to need training to use memory strategies.  -BB        Memory Goals     Patient’s memory skills will be enhanced as reported by patient by utilizing internal memory strategies to recall up to 3 pieces of information after a 5- minute delay 80%:  -BB     Status: Patient’s memory skills will be enhanced as reported by patient by utilizing internal memory strategies to recall up to 3 pieces of information after a 5- minute delay Progressing as expected  -BB     Comments: Patient’s memory skills will be enhanced as reported by patient by utilizing internal memory strategies to recall up to 3 pieces of information after a 5- minute delay Prospective memory task (recall of 2 hidden belongings at end of session): 2.5/4 (pt able to recall items with a prompt and location of items, however he was unable to recall location of each item appropriately). Prospective memory task (recall of 2 questions to ask SLP when timer sounds) w external memory aid: 1/4 with prompt. Delayed recall task (recall of 2 names/faces): pt able to recall names of individuals, however unable to match name with face; able to do so w memory aid (which pt recalled that he had spontaneously; he required assistance to locate where information was written). Reviewed rationale for developing procedural memory with external aid and rationale for others to assist with maintaining reminders in memory notebook. Pt demo's emerging abilities with procedural memory and prospective memory. Pt will continue to benefit from skilled SLP services to develop these skills further w external memory aids.  -BB               User Key  (r) = Recorded By, (t) = Taken By, (c) = Cosigned By      Initials Name Provider Type    Easton Crenshaw, SLP Speech and Language Pathologist                           Time Calculation:   SLP Start Time: 1020  SLP Stop Time: 1105  SLP Time Calculation (min): 45 min  Timed Charges  84990-LX Dev of Cogn Skills Initial Minutes: 15  95791-XQ Dev of Cogn Skills Add Minutes: 30  Total Minutes  Timed Charges Total  Minutes: 45   Total Minutes: 45    Therapy Charges for Today       Code Description Service Date Service Provider Modifiers Qty    02368257882  ST DEV OF COGN SKILLS INITIAL 15 MIN 6/7/2023 Easton Cronin, SLP  1    [id number]  ST DEV OF COGN SKILLS EACH ADDT'L 15 MIN 6/7/2023 Easton Cronin, SLP  2                     Easton Cronin, RADHIKA  6/7/2023

## 2023-06-14 ENCOUNTER — TELEPHONE (OUTPATIENT)
Dept: NEUROLOGY | Facility: CLINIC | Age: 83
End: 2023-06-14
Payer: MEDICARE

## 2023-06-14 ENCOUNTER — HOSPITAL ENCOUNTER (OUTPATIENT)
Dept: SPEECH THERAPY | Facility: HOSPITAL | Age: 83
Setting detail: THERAPIES SERIES
Discharge: HOME | End: 2023-06-14
Payer: MEDICARE

## 2023-06-14 DIAGNOSIS — G31.84 MILD COGNITIVE IMPAIRMENT WITH MEMORY LOSS: Primary | ICD-10-CM

## 2023-06-14 DIAGNOSIS — R41.841 COGNITIVE COMMUNICATION DEFICIT: ICD-10-CM

## 2023-06-14 PROCEDURE — 97129 THER IVNTJ 1ST 15 MIN: CPT

## 2023-06-14 PROCEDURE — 97130 THER IVNTJ EA ADDL 15 MIN: CPT

## 2023-06-14 NOTE — TELEPHONE ENCOUNTER
Provider: JUSTUS  Caller: CLAUDIA  Relationship to Patient: WIFE  Phone Number: [phone]  Reason for Call:PT'S WIFE CALLED AND STATES THAT WHEN PT TOOK 2 MEMANTINE 5MG 1 TIME AND IT MADE PT HAVE BAD SIDE EFFECTS, PT'S WIFE  STATES WHEN TAKING 2 PILLS THE PT WAS VERY TIRED, LETHARGIC, NO ENERGY OR STRENGTH,WEAK VOICE, SORE IN NECK AND HEADAND COULD BARLEY CARRY ON A CONVERSATION AND WAS VERY OUT OF IT. WIFE STATES THAT HE IS ONLY TAKING 1 A DAY NOW AND HE IS TOLERATING THAT JUST FINE. WIFE WANTS TO KNOW IF ONE PILL A DAY IS FINE OR WHAT SHOULD BE DONE ABOUT MEDICATION.    PLEASE REVIEW AND ADVISE.  THANK YOU

## 2023-06-14 NOTE — THERAPY TREATMENT NOTE
Outpatient Speech Language Pathology   Adult Speech Language Cognitive Progress Note  [facility]     Patient Name: Sergey Mendoza  : 1940  MRN: [id number]  Today's Date: 2023         Visit Date: 2023   Patient Active Problem List   Diagnosis    Benign essential hypertension    Benign prostatic hypertrophy, 2008--negative biopsy.    Carotid artery plaque, 2021--mild bilateral.  09/10/2018--mild bilateral. 2016--mild bilateral carotid artery plaque.    Coronary artery disease involving native coronary artery of native heart without angina pectoris    Diverticulosis of colon    Gastroesophageal reflux disease without esophagitis    Generalized osteoarthritis of multiple sites    Hyperlipidemia    History of myocardial infarction, 10/08/2010--patient presented with an anterior myocardial infarction.    Primary osteoarthritis of right knee    History of PTCA, 10/08/2010--anterior MI.  EF 40%.  PTCA with drug-eluting stent proximal LAD.  10% LM.  100% diagonal 1.  30% diffuse circumflex.  30% diffuse RCA.  --EF 64%    Vitamin D deficiency    Elevated PSA    Internal hemorrhoids    Therapeutic drug monitoring    Splenomegaly    Multiple actinic keratoses    Asymptomatic cholelithiasis    Microscopic hematuria    Mild cognitive impairment with memory loss    Thrombocytopenia    History of 2019 novel coronavirus disease (COVID-19)    History of colon polyps, 2022--tubular adenoma x2.          Visit Dx:    ICD-10-CM ICD-9-CM   1. Mild cognitive impairment with memory loss  G31.84 331.83   2. Cognitive communication deficit  R41.841 799.52                              SLP OP Goals       Row Name 23 1100          Subjective Comments    Subjective Comments Patient reports that he stopped new memory medication due to unwanted ghazal-effects.  -BB        Memory Goals    Patient and family will implement compensatory strategies to maximize patient’s Memory function so patient can  continue to participate in daily activities 80%:  -BB     Status: Patient and family will implement compensatory strategies to maximize patient’s Memory function so patient can continue to participate in daily activities Progressing as expected  -BB     Comments: Patient and family will implement compensatory strategies to maximize patient’s Memory function so patient can continue to participate in daily activities Pt implemented external memory aid on 25% of occasions spontaneously with prospective and delayed recall tasks. This is an improvement.  -BB     Patient will be able to remember  information needed to participate in activities of daily living Recall of tasks at home: pt reports frequently forgetting to do tasks at home such as retrieving items from another room or completing requests that his wife has of him.  -BB     Status: Patient will be able to remember  information needed to participate in activities of daily living Progressing as expected  -BB     Comments: Patient will be able to remember  information needed to participate in activities of daily living Pt reports that he continues to write names of individuals at senior care [place] in memory notebook, however he has inconsistently utilized or remembered to utilize  -BB     Patient’s memory skills will be enhanced as reported by patient by utilizing internal memory strategies to recall up to 3 pieces of information after a 5- minute delay 80%:  -BB     Status: Patient’s memory skills will be enhanced as reported by patient by utilizing internal memory strategies to recall up to 3 pieces of information after a 5- minute delay Progressing as expected  -BB     Comments: Patient’s memory skills will be enhanced as reported by patient by utilizing internal memory strategies to recall up to 3 pieces of information after a 5- minute delay Prospective memory task (recall of 2 hidden belongings at end of session): 2.5/4 (pt did not recall that he could use  "external memory aid for assisted; he was cued to utilize external memory aid whiched improved accuracy to 100% acc). Prospective memory task (recall of 2 facts when timer sounds) w external memory aid: 0% acc (pt unable to recall task; cued to utilize external memory aid where he had information written; after consulting memory aid, pt able to supply 2 facts). Delayed recall task (recall of 2 names/faces): Pt spontaneously utilized external memory aid to provide names of faces with 100% acc. Reviewed rationale for developing procedural memory with external aid and rationale for others to assist with maintaining reminders in memory notebook. Pt continues to demo emerging abilities with procedural memory and prospective memory. Pt will continue to benefit from skilled SLP services to develop these skills further w external memory aids.  -BB     Patient’s memory skills will be enhanced as reported by patient by using external memory aides 80%:  -BB     Status: Patient’s memory skills will be enhanced as reported by patient by using external memory aides Progressing as expected  -BB     Comments: Patient’s memory skills will be enhanced as reported by patient by using external memory aides 4/26/23: \"Delayed recall of 5 pieces of historical information with cues to utilize external memory strategy (as multimodal learning strategy and prompt as needed): 80% acc w SUP. Training/education regarding organization and use of external memory strategy during or immediately after conversations on the phone and during avocational task.\"  -BB        Attention/Orientation Goals    Patient will be able to safely perform ADLs With Supervision  -BB     Status: Patient will be able to safely perform ADLs Progressing as expected  -BB     Comments:Patient will be able to safely perform ADLs Pt reports that he is doing less at home because he feels like he is having trouble completing tasks.  -BB     Patient will improve attention skills " by sustaining focus and participation to conversation/task 10 minute task  -BB     Status: Patient will improve attention skills by sustaining focus and participation to conversation/task Progressing as expected  -BB     Comments: Patient will improve attention skills by sustaining focus and participation to conversation/task Basic-advanced sustained attention taski: approximately 75% acc.  -BB               User Key  (r) = Recorded By, (t) = Taken By, (c) = Cosigned By      Initials Name Provider Type    BB Easton Cronin, SLP Speech and Language Pathologist                           Time Calculation:   SLP Start Time: 1015  SLP Stop Time: 1100  SLP Time Calculation (min): 45 min  Timed Charges  12072-KX Dev of Cogn Skills Initial Minutes: 15  44877-UI Dev of Cogn Skills Add Minutes: 30  Total Minutes  Timed Charges Total Minutes: 45   Total Minutes: 45    Therapy Charges for Today       Code Description Service Date Service Provider Modifiers Qty    82269226155 HC ST DEV OF COGN SKILLS INITIAL 15 MIN 6/14/2023 Easton Cronin, SLP  1    43727853921 HC ST DEV OF COGN SKILLS EACH ADDT'L 15 MIN 6/14/2023 Easton Cronin, SLP  2                     Easton Cronin, SLP  6/14/2023

## 2023-06-16 NOTE — TELEPHONE ENCOUNTER
Spoke to patient's wife.  We will continue with the 5 mg daily for a month then try 1 in the morning and 1 at night.

## 2023-07-26 ENCOUNTER — APPOINTMENT (OUTPATIENT)
Dept: SPEECH THERAPY | Facility: HOSPITAL | Age: 83
End: 2023-07-26
Payer: MEDICARE

## 2023-08-02 ENCOUNTER — HOSPITAL ENCOUNTER (OUTPATIENT)
Dept: SPEECH THERAPY | Facility: HOSPITAL | Age: 83
Setting detail: THERAPIES SERIES
Discharge: HOME | End: 2023-08-02
Payer: MEDICARE

## 2023-08-02 DIAGNOSIS — G31.84 MILD COGNITIVE IMPAIRMENT WITH MEMORY LOSS: Primary | ICD-10-CM

## 2023-08-02 DIAGNOSIS — R41.841 COGNITIVE COMMUNICATION DEFICIT: ICD-10-CM

## 2023-08-02 PROCEDURE — 97130 THER IVNTJ EA ADDL 15 MIN: CPT | Performed by: SPEECH-LANGUAGE PATHOLOGIST

## 2023-08-02 PROCEDURE — 97129 THER IVNTJ 1ST 15 MIN: CPT | Performed by: SPEECH-LANGUAGE PATHOLOGIST

## 2023-08-09 ENCOUNTER — HOSPITAL ENCOUNTER (OUTPATIENT)
Dept: SPEECH THERAPY | Facility: HOSPITAL | Age: 83
Setting detail: THERAPIES SERIES
Discharge: HOME | End: 2023-08-09
Payer: MEDICARE

## 2023-08-09 DIAGNOSIS — R41.841 COGNITIVE COMMUNICATION DEFICIT: ICD-10-CM

## 2023-08-09 DIAGNOSIS — G31.84 MILD COGNITIVE IMPAIRMENT WITH MEMORY LOSS: Primary | ICD-10-CM

## 2023-08-09 PROCEDURE — 97130 THER IVNTJ EA ADDL 15 MIN: CPT

## 2023-08-09 PROCEDURE — 97129 THER IVNTJ 1ST 15 MIN: CPT

## 2023-08-09 NOTE — THERAPY TREATMENT NOTE
Outpatient Speech Language Pathology   Adult Speech Language Cognitive Treatment Note  [facility]     Patient Name: Sergey Mendoza  : 1940  MRN: [id number]  Today's Date: 2023         Visit Date: 2023   Patient Active Problem List   Diagnosis    Benign essential hypertension    Benign prostatic hypertrophy, 2008--negative biopsy.    Carotid artery plaque, 2021--mild bilateral.  09/10/2018--mild bilateral. 2016--mild bilateral carotid artery plaque.    Coronary artery disease involving native coronary artery of native heart without angina pectoris    Diverticulosis of colon    Gastroesophageal reflux disease without esophagitis    Generalized osteoarthritis of multiple sites    Hyperlipidemia    History of myocardial infarction, 10/08/2010--patient presented with an anterior myocardial infarction.    Primary osteoarthritis of right knee    History of PTCA, 10/08/2010--anterior MI.  EF 40%.  PTCA with drug-eluting stent proximal LAD.  10% LM.  100% diagonal 1.  30% diffuse circumflex.  30% diffuse RCA.  --EF 64%    Vitamin D deficiency    Elevated PSA    Internal hemorrhoids    Therapeutic drug monitoring    Splenomegaly    Multiple actinic keratoses    Asymptomatic cholelithiasis    Microscopic hematuria    Mild cognitive impairment with memory loss    Thrombocytopenia    History of 2019 novel coronavirus disease (COVID-19)    History of colon polyps, 2022--tubular adenoma x2.          Visit Dx:    ICD-10-CM ICD-9-CM   1. Mild cognitive impairment with memory loss  G31.84 331.83   2. Cognitive communication deficit  R41.841 799.52                              SLP OP Goals       Row Name 23 1100          Subjective Comments    Subjective Comments Pt pleasant and cooperative.  -BB        Memory Goals    Patient's memory skills will be enhanced as reported by patient by using external memory aides 80%:  -BB     Status: Patient's memory skills will be enhanced as  reported by patient by using external memory aides Progressing as expected  -BB     Comments: Patient's memory skills will be enhanced as reported by patient by using external memory aides Delayed recall of table task with spaced retrieval: 100% acc wo use of external memory aid. Delayed recall of message w prospective memory component: 75% acc and 100% acc w mod A (external memory and routine).  -BB               User Key  (r) = Recorded By, (t) = Taken By, (c) = Cosigned By      Initials Name Provider Type    BB Easton Cronin, SLP Speech and Language Pathologist                           Time Calculation:   SLP Start Time: 1015  SLP Stop Time: 1115  SLP Time Calculation (min): 60 min  Timed Charges  35957-XI Dev of Cogn Skills Initial Minutes: 15  57662-FX Dev of Cogn Skills Add Minutes: 45  Total Minutes  Timed Charges Total Minutes: 60   Total Minutes: 60    Therapy Charges for Today       Code Description Service Date Service Provider Modifiers Qty    55145494202 HC ST DEV OF COGN SKILLS INITIAL 15 MIN 8/9/2023 Easton Cronin, SLP  1    [id number] HC ST DEV OF COGN SKILLS EACH ADDT'L 15 MIN 8/9/2023 Easton Cronin, SLP  3                     Easton Cronin, SLP  8/9/2023

## 2023-08-16 ENCOUNTER — HOSPITAL ENCOUNTER (OUTPATIENT)
Dept: SPEECH THERAPY | Facility: HOSPITAL | Age: 83
Setting detail: THERAPIES SERIES
Discharge: HOME | End: 2023-08-16
Payer: MEDICARE

## 2023-08-16 DIAGNOSIS — G31.84 MILD COGNITIVE IMPAIRMENT WITH MEMORY LOSS: Primary | ICD-10-CM

## 2023-08-16 DIAGNOSIS — R41.841 COGNITIVE COMMUNICATION DEFICIT: ICD-10-CM

## 2023-08-16 PROCEDURE — 97130 THER IVNTJ EA ADDL 15 MIN: CPT

## 2023-08-16 PROCEDURE — 97129 THER IVNTJ 1ST 15 MIN: CPT

## 2023-08-16 NOTE — THERAPY TREATMENT NOTE
Outpatient Speech Language Pathology   Adult Speech Language Cognitive Treatment Note  [facility]     Patient Name: Sergey Mendoza  : 1940  MRN: [id number]  Today's Date: 2023         Visit Date: 2023   Patient Active Problem List   Diagnosis    Benign essential hypertension    Benign prostatic hypertrophy, 2008--negative biopsy.    Carotid artery plaque, 2021--mild bilateral.  09/10/2018--mild bilateral. 2016--mild bilateral carotid artery plaque.    Coronary artery disease involving native coronary artery of native heart without angina pectoris    Diverticulosis of colon    Gastroesophageal reflux disease without esophagitis    Generalized osteoarthritis of multiple sites    Hyperlipidemia    History of myocardial infarction, 10/08/2010--patient presented with an anterior myocardial infarction.    Primary osteoarthritis of right knee    History of PTCA, 10/08/2010--anterior MI.  EF 40%.  PTCA with drug-eluting stent proximal LAD.  10% LM.  100% diagonal 1.  30% diffuse circumflex.  30% diffuse RCA.  --EF 64%    Vitamin D deficiency    Elevated PSA    Internal hemorrhoids    Therapeutic drug monitoring    Splenomegaly    Multiple actinic keratoses    Asymptomatic cholelithiasis    Microscopic hematuria    Mild cognitive impairment with memory loss    Thrombocytopenia    History of 2019 novel coronavirus disease (COVID-19)    History of colon polyps, 2022--tubular adenoma x2.          Visit Dx:    ICD-10-CM ICD-9-CM   1. Mild cognitive impairment with memory loss  G31.84 331.83   2. 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  -BB        Memory Goals    Patient's memory skills will be enhanced as reported by patient by utilizing internal memory strategies to recall up  to 3 pieces of information after a 5- minute delay 80%:  -BB     Status: Patient's memory skills will be enhanced as reported by patient by utilizing internal memory strategies to recall up to 3 pieces of information after a 5- minute delay Progressing as expected  -BB     Comments: Patient's memory skills will be enhanced as reported by patient by utilizing internal memory strategies to recall up to 3 pieces of information after a 5- minute delay Delayed recall of 3 pieces of information (4 min): 100% acc w spaced retrieval. Delayed recall of functional phrase with spaced retrieval: pt able to recall functional phrase after one minute with min cues.  -BB        Attention/Orientation Goals    Patient will improve attention skills by sustaining focus and participation to conversation/task 10 minute task  -BB     Status: Patient will improve attention skills by sustaining focus and participation to conversation/task Progressing as expected  -BB     Comments: Patient will improve attention skills by sustaining focus and participation to conversation/task Basic-advanced sustained auditory attn task (sequence in reverse): 100% acc  -BB               User Key  (r) = Recorded By, (t) = Taken By, (c) = Cosigned By      Initials Name Provider Type    Easton Crenshaw, RADHIKA Speech and Language Pathologist                           Time Calculation:   SLP Start Time: 1015  SLP Stop Time: 1100  SLP Time Calculation (min): 45 min  Timed Charges  84618-NV Dev of Cogn Skills Initial Minutes: 15  36507-MA Dev of Cogn Skills Add Minutes: 30  Total Minutes  Timed Charges Total Minutes: 45   Total Minutes: 45    Therapy Charges for Today       Code Description Service Date Service Provider Modifiers Qty    66048024127 HC ST DEV OF COGN SKILLS INITIAL 15 MIN 8/16/2023 Easton Cronin, SLP  1    80413549603 HC ST DEV OF COGN SKILLS EACH ADDT'L 15 MIN 8/16/2023 Easton Cronin, RADHIKA  2                     RADHIKA Gonzalez  8/16/2023

## 2023-08-22 DIAGNOSIS — I25.10 CORONARY ARTERY DISEASE INVOLVING NATIVE CORONARY ARTERY OF NATIVE HEART WITHOUT ANGINA PECTORIS: Chronic | ICD-10-CM

## 2023-08-22 DIAGNOSIS — I10 BENIGN ESSENTIAL HYPERTENSION: Chronic | ICD-10-CM

## 2023-08-23 ENCOUNTER — HOSPITAL ENCOUNTER (OUTPATIENT)
Dept: SPEECH THERAPY | Facility: HOSPITAL | Age: 83
Setting detail: THERAPIES SERIES
Discharge: HOME | End: 2023-08-23
Payer: MEDICARE

## 2023-08-23 DIAGNOSIS — E78.2 MIXED HYPERLIPIDEMIA: Chronic | ICD-10-CM

## 2023-08-23 DIAGNOSIS — G31.84 MILD COGNITIVE IMPAIRMENT WITH MEMORY LOSS: Primary | ICD-10-CM

## 2023-08-23 PROCEDURE — 97130 THER IVNTJ EA ADDL 15 MIN: CPT

## 2023-08-23 PROCEDURE — 97129 THER IVNTJ 1ST 15 MIN: CPT

## 2023-08-23 RX ORDER — ROSUVASTATIN CALCIUM 20 MG/1
TABLET, COATED ORAL
Qty: 90 TABLET | Refills: 3 | Status: SHIPPED | OUTPATIENT
Start: 2023-08-23

## 2023-08-23 RX ORDER — METOPROLOL SUCCINATE 50 MG/1
TABLET, EXTENDED RELEASE ORAL
Qty: 90 TABLET | Refills: 3 | Status: SHIPPED | OUTPATIENT
Start: 2023-08-23

## 2023-08-23 NOTE — THERAPY TREATMENT NOTE
Outpatient Speech Language Pathology   Adult Speech Language Cognitive Treatment Note  [facility]     Patient Name: Sergey Mendoza  : 1940  MRN: [id number]  Today's Date: 2023         Visit Date: 2023   Patient Active Problem List   Diagnosis    Benign essential hypertension    Benign prostatic hypertrophy, 2008--negative biopsy.    Carotid artery plaque, 2021--mild bilateral.  09/10/2018--mild bilateral. 2016--mild bilateral carotid artery plaque.    Coronary artery disease involving native coronary artery of native heart without angina pectoris    Diverticulosis of colon    Gastroesophageal reflux disease without esophagitis    Generalized osteoarthritis of multiple sites    Hyperlipidemia    History of myocardial infarction, 10/08/2010--patient presented with an anterior myocardial infarction.    Primary osteoarthritis of right knee    History of PTCA, 10/08/2010--anterior MI.  EF 40%.  PTCA with drug-eluting stent proximal LAD.  10% LM.  100% diagonal 1.  30% diffuse circumflex.  30% diffuse RCA.  --EF 64%    Vitamin D deficiency    Elevated PSA    Internal hemorrhoids    Therapeutic drug monitoring    Splenomegaly    Multiple actinic keratoses    Asymptomatic cholelithiasis    Microscopic hematuria    Mild cognitive impairment with memory loss    Thrombocytopenia    History of 2019 novel coronavirus disease (COVID-19)    History of colon polyps, 2022--tubular adenoma x2.          Visit Dx:    ICD-10-CM ICD-9-CM   1. Mild cognitive impairment with memory loss  G31.84 331.83        OP SLP Assessment/Plan - 23 1327          SLP Plan    Plan Comments Patient would continue to benefit from skilled ST  -TH              User Key  (r) = Recorded By, (t) = Taken By, (c) = Cosigned By      Initials Name Provider Type    TH Dinora Guerra Speech and Language Pathologist                                       SLP OP Goals       Row Name 23 1300          Memory Goals     Memory STG's Patient's memory skills will be enhanced as reported by patient by utilizing internal memory strategies to recall up to 3 pieces of information after a 5- minute delay;Patient's memory skills will be enhanced as reported by patient by using external memory aides  -TH     Status: Patient's memory skills will be enhanced as reported by patient by utilizing internal memory strategies to recall up to 3 pieces of information after a 5- minute delay Progressing as expected  -TH     Comments: Patient's memory skills will be enhanced as reported by patient by utilizing internal memory strategies to recall up to 3 pieces of information after a 5- minute delay Patient was able to recall 3/4 pieces of novel info on this date. He was able to complete memory worksheet task in which he had to associate pictures and remember their matches with 100% accuracy with no cues.  Min cues needed for initial use of association to assist with recall.  -TH     Status: Patient's memory skills will be enhanced as reported by patient by using external memory aides Progressing as expected  -TH     Comments: Patient's memory skills will be enhanced as reported by patient by using external memory aides Patient reports he is using external memory aide as he thinks about it outside of therapy. Continued to discuss additional external and internal memory strategies.  -TH        Attention/Orientation Goals    Status: Patient will improve attention skills by sustaining focus and participation to conversation/task Progressing as expected  -TH     Comments: Patient will improve attention skills by sustaining focus and participation to conversation/task Patient was able to participate in conversational task and sustain adequate attention to task but occasionally tangential speech was observed and he would require cues for redirection to initial conversation.  -TH               User Key  (r) = Recorded By, (t) = Taken By, (c) = Cosigned By       Initials Name Provider Type    TH Dinora Guerra Speech and Language Pathologist                   OP SLP Education       Row Name 08/23/23 1327       Education    Barriers to Learning No barriers identified  -TH    Education Provided Patient demonstrated recommended strategies  -TH    Assessed Learning needs;Learning motivation  -    Learning Motivation Strong  -TH    Learning Method Explanation;Teach back  -TH    Teaching Response Verbalized understanding  -TH              User Key  (r) = Recorded By, (t) = Taken By, (c) = Cosigned By      Initials Name Effective Dates    TH Dinora Guerra 06/16/21 -                          Time Calculation:   SLP Start Time: 1100  SLP Stop Time: 1145  SLP Time Calculation (min): 45 min    Therapy Charges for Today       Code Description Service Date Service Provider Modifiers Qty    96082812682 HC ST DEV OF COGN SKILLS INITIAL 15 MIN 8/23/2023 Dinora Guerra  1    38036968311 HC ST DEV OF COGN SKILLS EACH ADDT'L 15 MIN 8/23/2023 Dinora Guerra  3                     Dinora Guerra  8/23/2023

## 2023-08-24 DIAGNOSIS — G31.84 MILD COGNITIVE IMPAIRMENT: ICD-10-CM

## 2023-08-24 DIAGNOSIS — R41.3 SHORT-TERM MEMORY LOSS: ICD-10-CM

## 2023-08-24 RX ORDER — MEMANTINE HYDROCHLORIDE 5 MG/1
TABLET ORAL
Qty: 70 TABLET | Refills: 0 | OUTPATIENT
Start: 2023-08-24 | End: 2023-09-23

## 2023-08-24 NOTE — TELEPHONE ENCOUNTER
"Caller: DIAZ Hightower call back number: [phone]     Requested Prescriptions: MEMATINE 5 MG   Requested Prescriptions      No prescriptions requested or ordered in this encounter        Pharmacy where request should be sent:  [facility]/pharmacy #[id number] [facility] [address] [address] AT [facility] - [phone] [facility] [phone]  [phone]     Last office visit with prescribing clinician: 5/30/2023   Last telemedicine visit with prescribing clinician: Visit date not found   Next office visit with prescribing clinician: Visit date not found     Additional details provided by patient: PT NEEDS REFILL ON 5 MG OF MEMATINE    Does the patient have less than a 3 day supply:  [] Yes  [] No    Would you like a call back once the refill request has been completed: [] Yes [] No    If the office needs to give you a call back, can they leave a voicemail: [] Yes [] No    Benjy Maldonado   08/24/23 16:00 EDT       request."   "

## 2023-08-25 DIAGNOSIS — R41.3 SHORT-TERM MEMORY LOSS: ICD-10-CM

## 2023-08-25 DIAGNOSIS — G31.84 MILD COGNITIVE IMPAIRMENT: ICD-10-CM

## 2023-08-25 RX ORDER — MEMANTINE HYDROCHLORIDE 5 MG/1
TABLET ORAL
Qty: 70 TABLET | Refills: 0 | OUTPATIENT
Start: 2023-08-25

## 2023-08-25 NOTE — TELEPHONE ENCOUNTER
ZAC CAN YOU CALL PT ON THIS RX.  YESTERDAY HE SAID HE WAS ON THE 10 BUT  SWITCHED  HIM BACK TO THE 5MG? HE WAS CUTTING THE 10 IN HALF?     PLEASE ADVISE.

## 2023-08-28 RX ORDER — MEMANTINE HYDROCHLORIDE 5 MG/1
5 TABLET ORAL DAILY
Qty: 30 TABLET | Refills: 5 | Status: SHIPPED | OUTPATIENT
Start: 2023-08-28 | End: 2024-08-27

## 2023-08-28 NOTE — TELEPHONE ENCOUNTER
Spoke to patients wife. He can only tolerate 5 mg daily of medication. Per dr. Weston barros to send in 5 mg daily to pharmacy wife notified.

## 2023-08-30 ENCOUNTER — HOSPITAL ENCOUNTER (OUTPATIENT)
Dept: SPEECH THERAPY | Facility: HOSPITAL | Age: 83
Setting detail: THERAPIES SERIES
Discharge: HOME | End: 2023-08-30
Payer: MEDICARE

## 2023-08-30 DIAGNOSIS — G31.84 MILD COGNITIVE IMPAIRMENT WITH MEMORY LOSS: Primary | ICD-10-CM

## 2023-08-30 DIAGNOSIS — R41.841 COGNITIVE COMMUNICATION DEFICIT: ICD-10-CM

## 2023-08-30 PROCEDURE — 97130 THER IVNTJ EA ADDL 15 MIN: CPT

## 2023-08-30 PROCEDURE — 97129 THER IVNTJ 1ST 15 MIN: CPT

## 2023-08-30 NOTE — THERAPY TREATMENT NOTE
Outpatient Speech Language Pathology   Adult Speech Language Cognitive Treatment Note  [facility]     Patient Name: Sergey Mendoza  : 1940  MRN: [id number]  Today's Date: 2023         Visit Date: 2023   Patient Active Problem List   Diagnosis    Benign essential hypertension    Benign prostatic hypertrophy, 2008--negative biopsy.    Carotid artery plaque, 2021--mild bilateral.  09/10/2018--mild bilateral. 2016--mild bilateral carotid artery plaque.    Coronary artery disease involving native coronary artery of native heart without angina pectoris    Diverticulosis of colon    Gastroesophageal reflux disease without esophagitis    Generalized osteoarthritis of multiple sites    Hyperlipidemia    History of myocardial infarction, 10/08/2010--patient presented with an anterior myocardial infarction.    Primary osteoarthritis of right knee    History of PTCA, 10/08/2010--anterior MI.  EF 40%.  PTCA with drug-eluting stent proximal LAD.  10% LM.  100% diagonal 1.  30% diffuse circumflex.  30% diffuse RCA.  --EF 64%    Vitamin D deficiency    Elevated PSA    Internal hemorrhoids    Therapeutic drug monitoring    Splenomegaly    Multiple actinic keratoses    Asymptomatic cholelithiasis    Microscopic hematuria    Mild cognitive impairment with memory loss    Thrombocytopenia    History of 2019 novel coronavirus disease (COVID-19)    History of colon polyps, 2022--tubular adenoma x2.          Visit Dx:    ICD-10-CM ICD-9-CM   1. Mild cognitive impairment with memory loss  G31.84 331.83   2. Cognitive communication deficit  R41.841 799.52                   Pt appears to continue to benefit from skilled SLP services with strategies and spaced retrieval.           SLP OP Goals       Row Name 23 1100          Subjective Comments    Subjective Comments Pt reports that he had trouble remembering his phone number the other day.  -BB        Memory Goals    Patient's memory skills  will be enhanced as reported by patient by utilizing internal memory strategies to recall up to 3 pieces of information after a 5- minute delay 80%:  -BB     Status: Patient's memory skills will be enhanced as reported by patient by utilizing internal memory strategies to recall up to 3 pieces of information after a 5- minute delay Progressing as expected  -BB     Comments: Patient's memory skills will be enhanced as reported by patient by utilizing internal memory strategies to recall up to 3 pieces of information after a 5- minute delay Delayed recall of phone number with spaced retreival: successful for recall after 2 min after distractions. Delayed recall of 3 related items with spaced retrieval: successful recall for 3 items after 1 minute. Additional training provided re: internal memory strategies (eg, association, visualization, repetition, chunking): min-mod A  -BB        Attention/Orientation Goals    Patient will improve attention skills by sustaining focus and participation to conversation/task 10 minute task  -BB     Status: Patient will improve attention skills by sustaining focus and participation to conversation/task Progressing as expected  -BB     Comments: Patient will improve attention skills by sustaining focus and participation to conversation/task Advanced divided attn task (auditory): 100% acc.  -BB               User Key  (r) = Recorded By, (t) = Taken By, (c) = Cosigned By      Initials Name Provider Type    Easton Crenshaw, SLP Speech and Language Pathologist                           Time Calculation:   SLP Start Time: 1100  SLP Stop Time: 1145  SLP Time Calculation (min): 45 min  Timed Charges  35661-FF Dev of Cogn Skills Initial Minutes: 15  98944-NZ Dev of Cogn Skills Add Minutes: 30  Total Minutes  Timed Charges Total Minutes: 45   Total Minutes: 45    Therapy Charges for Today       Code Description Service Date Service Provider Modifiers Qty    50975878301  ST DEV OF COGN SKILLS  INITIAL 15 MIN 8/30/2023 Easton Cronin, SLP KX 1    [id number]  ST DEV OF COGN SKILLS EACH ADDT'L 15 MIN 8/30/2023 Easton Cronin, SLP KX 2                     Easton Cronin, RADHIKA  8/30/2023

## 2023-09-06 ENCOUNTER — HOSPITAL ENCOUNTER (OUTPATIENT)
Dept: SPEECH THERAPY | Facility: HOSPITAL | Age: 83
Setting detail: THERAPIES SERIES
Discharge: HOME | End: 2023-09-06
Payer: MEDICARE

## 2023-09-06 DIAGNOSIS — G31.84 MILD COGNITIVE IMPAIRMENT WITH MEMORY LOSS: Primary | ICD-10-CM

## 2023-09-06 DIAGNOSIS — R41.841 COGNITIVE COMMUNICATION DEFICIT: ICD-10-CM

## 2023-09-06 PROCEDURE — 97130 THER IVNTJ EA ADDL 15 MIN: CPT

## 2023-09-06 PROCEDURE — 97129 THER IVNTJ 1ST 15 MIN: CPT

## 2023-09-06 NOTE — THERAPY TREATMENT NOTE
Outpatient Speech Language Pathology   Adult Speech Language Cognitive Treatment Note  [facility]     Patient Name: Sergey Mendoza  : 1940  MRN: [id number]  Today's Date: 2023         Visit Date: 2023   Patient Active Problem List   Diagnosis    Benign essential hypertension    Benign prostatic hypertrophy, 2008--negative biopsy.    Carotid artery plaque, 2021--mild bilateral.  09/10/2018--mild bilateral. 2016--mild bilateral carotid artery plaque.    Coronary artery disease involving native coronary artery of native heart without angina pectoris    Diverticulosis of colon    Gastroesophageal reflux disease without esophagitis    Generalized osteoarthritis of multiple sites    Hyperlipidemia    History of myocardial infarction, 10/08/2010--patient presented with an anterior myocardial infarction.    Primary osteoarthritis of right knee    History of PTCA, 10/08/2010--anterior MI.  EF 40%.  PTCA with drug-eluting stent proximal LAD.  10% LM.  100% diagonal 1.  30% diffuse circumflex.  30% diffuse RCA.  --EF 64%    Vitamin D deficiency    Elevated PSA    Internal hemorrhoids    Therapeutic drug monitoring    Splenomegaly    Multiple actinic keratoses    Asymptomatic cholelithiasis    Microscopic hematuria    Mild cognitive impairment with memory loss    Thrombocytopenia    History of 2019 novel coronavirus disease (COVID-19)    History of colon polyps, 2022--tubular adenoma x2.          Visit Dx:    ICD-10-CM ICD-9-CM   1. Mild cognitive impairment with memory loss  G31.84 331.83   2. Cognitive communication deficit  R41.841 799.52                              SLP OP Goals       Row Name 23 1100          Subjective Comments    Subjective Comments Patient is pleasant and cooperative. 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  -CR        Subjective Pain    Able to rate subjective pain? yes  -CR      Pre-Treatment Pain Level 0  -CR     Post-Treatment Pain Level 0  -CR        Memory Goals    Patient’s memory skills will be enhanced as reported by patient by utilizing internal memory strategies to recall up to 3 pieces of information after a 5- minute delay 80%:  -CR     Status: Patient’s memory skills will be enhanced as reported by patient by utilizing internal memory strategies to recall up to 3 pieces of information after a 5- minute delay Progressing as expected  -CR     Comments: Patient’s memory skills will be enhanced as reported by patient by utilizing internal memory strategies to recall up to 3 pieces of information after a 5- minute delay Due to patient complaints of inability to remember participant names at [employer] he volunteers, extensive education regarding association and visualization memory strategies provided. Following education, patient utilized both strategies during name-face association task with x5 characters given MIN-MOD cues. Patient recalled x5 characters names with 20% accuracy given NO cues, 100% accuracy when given choices. Further memory task completed using [employer] on iPad (voicemail task) with 100% accuracy given NO cues with choices.  -CR     Patient’s memory skills will be enhanced as reported by patient by using external memory aides 80%:  -CR     Status: Patient’s memory skills will be enhanced as reported by patient by using external memory aides Progressing as expected  -CR     Comments: Patient’s memory skills will be enhanced as reported by patient by using external memory aides Encouraged patient to keep external memory book specifically for recalling faces/names of participants at detention [place]. Patient verbalized understanding and agreed to keeping a log containing names with associated skills or physical features.  -CR               User Key  (r) = Recorded By, (t) = Taken By, (c) = Cosigned By      Initials Name Provider Type    RAY Lemus  RADHIKA Weaver Speech and Language Pathologist                           Time Calculation:   SLP Start Time: 1100  SLP Stop Time: 1145  SLP Time Calculation (min): 45 min  Timed Charges  39487-XI Dev of Cogn Skills Initial Minutes: 15  57738-SR Dev of Cogn Skills Add Minutes: 30  Total Minutes  Timed Charges Total Minutes: 45   Total Minutes: 45    Therapy Charges for Today       Code Description Service Date Service Provider Modifiers Qty    92656403548 HC ST DEV OF COGN SKILLS INITIAL 15 MIN 9/6/2023 Meg Lemus, SLP  1    21715875950 HC ST DEV OF COGN SKILLS EACH ADDT'L 15 MIN 9/6/2023 Meg Lemus, RADHIKA  2                     RADHIKA Julio  9/6/2023

## 2023-09-13 ENCOUNTER — HOSPITAL ENCOUNTER (OUTPATIENT)
Dept: SPEECH THERAPY | Facility: HOSPITAL | Age: 83
Setting detail: THERAPIES SERIES
Discharge: HOME | End: 2023-09-13
Payer: MEDICARE

## 2023-09-13 DIAGNOSIS — R41.841 COGNITIVE COMMUNICATION DEFICIT: ICD-10-CM

## 2023-09-13 DIAGNOSIS — G31.84 MILD COGNITIVE IMPAIRMENT WITH MEMORY LOSS: Primary | ICD-10-CM

## 2023-09-13 PROCEDURE — 97130 THER IVNTJ EA ADDL 15 MIN: CPT

## 2023-09-13 PROCEDURE — 97129 THER IVNTJ 1ST 15 MIN: CPT

## 2023-09-13 NOTE — THERAPY TREATMENT NOTE
Outpatient Speech Language Pathology   Adult Speech Language Cognitive Treatment Note  [facility]     Patient Name: Sergey Mendoza  : 1940  MRN: [id number]  Today's Date: 2023         Visit Date: 2023   Patient Active Problem List   Diagnosis    Benign essential hypertension    Benign prostatic hypertrophy, 2008--negative biopsy.    Carotid artery plaque, 2021--mild bilateral.  09/10/2018--mild bilateral. 2016--mild bilateral carotid artery plaque.    Coronary artery disease involving native coronary artery of native heart without angina pectoris    Diverticulosis of colon    Gastroesophageal reflux disease without esophagitis    Generalized osteoarthritis of multiple sites    Hyperlipidemia    History of myocardial infarction, 10/08/2010--patient presented with an anterior myocardial infarction.    Primary osteoarthritis of right knee    History of PTCA, 10/08/2010--anterior MI.  EF 40%.  PTCA with drug-eluting stent proximal LAD.  10% LM.  100% diagonal 1.  30% diffuse circumflex.  30% diffuse RCA.  --EF 64%    Vitamin D deficiency    Elevated PSA    Internal hemorrhoids    Therapeutic drug monitoring    Splenomegaly    Multiple actinic keratoses    Asymptomatic cholelithiasis    Microscopic hematuria    Mild cognitive impairment with memory loss    Thrombocytopenia    History of 2019 novel coronavirus disease (COVID-19)    History of colon polyps, 2022--tubular adenoma x2.          Visit Dx:    ICD-10-CM ICD-9-CM   1. Mild cognitive impairment with memory loss  G31.84 331.83   2. Cognitive communication deficit  R41.841 799.52             External memory aids appeared to be highly effective for completing functional sequencing/deductive task.                 SLP OP Goals       Row Name 23 1100          Subjective Comments    Subjective Comments Pt able to recall last 2 SLP colleagues' names who worked with him when SLP was out of the office (spontaneously).  -BB         Memory Goals    Patient’s memory skills will be enhanced as reported by patient by using external memory aides 80%:  -BB     Status: Patient’s memory skills will be enhanced as reported by patient by using external memory aides Progressing as expected  -BB     Comments: Patient’s memory skills will be enhanced as reported by patient by using external memory aides Deductive reasoning task to target functional short-term memory task with external aids to complete after training to utilize external aids (eg, underline key information, use finger as place eaton, rosey completed items): 92% acc with mod-min cues (vanishing cues).  -BB               User Key  (r) = Recorded By, (t) = Taken By, (c) = Cosigned By      Initials Name Provider Type    Easton Crenshaw, SLP Speech and Language Pathologist                           Time Calculation:   SLP Start Time: 1100  SLP Stop Time: 1155  SLP Time Calculation (min): 55 min  Timed Charges  60690-GV Dev of Cogn Skills Initial Minutes: 15  99536-SS Dev of Cogn Skills Add Minutes: 45  Total Minutes  Timed Charges Total Minutes: 60   Total Minutes: 60    Therapy Charges for Today       Code Description Service Date Service Provider Modifiers Qty    47047618293 HC ST DEV OF COGN SKILLS INITIAL 15 MIN 9/13/2023 Easton Cronin, SLP KX 1    47478579328 HC ST DEV OF COGN SKILLS EACH ADDT'L 15 MIN 9/13/2023 Easton Cronin, SLP KX 3                     Easton Cronin, SLP  9/13/2023

## 2023-09-20 ENCOUNTER — APPOINTMENT (OUTPATIENT)
Dept: SPEECH THERAPY | Facility: HOSPITAL | Age: 83
End: 2023-09-20
Payer: MEDICARE

## 2023-09-25 RX ORDER — MEMANTINE HYDROCHLORIDE 5 MG/1
5 TABLET ORAL DAILY
Qty: 30 TABLET | Refills: 5 | Status: SHIPPED | OUTPATIENT
Start: 2023-09-25 | End: 2024-09-24

## 2023-09-27 ENCOUNTER — APPOINTMENT (OUTPATIENT)
Dept: SPEECH THERAPY | Facility: HOSPITAL | Age: 83
End: 2023-09-27
Payer: MEDICARE

## 2023-10-02 ENCOUNTER — OFFICE VISIT (OUTPATIENT)
Dept: NEUROLOGY | Facility: CLINIC | Age: 83
End: 2023-10-02
Payer: MEDICARE

## 2023-10-02 VITALS
WEIGHT: 157 LBS | HEIGHT: 69 IN | OXYGEN SATURATION: 100 % | HEART RATE: 72 BPM | DIASTOLIC BLOOD PRESSURE: 78 MMHG | SYSTOLIC BLOOD PRESSURE: 122 MMHG | BODY MASS INDEX: 23.25 KG/M2

## 2023-10-02 DIAGNOSIS — G31.84 MILD COGNITIVE IMPAIRMENT: Primary | ICD-10-CM

## 2023-10-02 DIAGNOSIS — G44.52 NEW DAILY PERSISTENT HEADACHE: ICD-10-CM

## 2023-10-02 PROCEDURE — 3078F DIAST BP <80 MM HG: CPT

## 2023-10-02 PROCEDURE — 1159F MED LIST DOCD IN RCRD: CPT

## 2023-10-02 PROCEDURE — 1160F RVW MEDS BY RX/DR IN RCRD: CPT

## 2023-10-02 PROCEDURE — 99214 OFFICE O/P EST MOD 30 MIN: CPT

## 2023-10-02 PROCEDURE — 3074F SYST BP LT 130 MM HG: CPT

## 2023-10-02 NOTE — PROGRESS NOTES
DOS: 10/2/2023  NAME: Sergey Mendoza   : 1940  PCP: Ramesh Blake MD    Chief Complaint   Patient presents with    Mild cognitive impairment      SUBJECTIVE  Neurological Problem:  83 y.o. RHW male with a past medical history of hypertension, hyperlipidemia, BPH, elevated PSA, osteoarthritis, GERD, CAD, MI (2010, stent x1), MCI. They are seen in follow up today for mild cognitive impairment, however the problem is new to the examiner. Patient last seen by Dr. Mario Ontiveros in May 2023, with a summary of the history taken from the previous note with additions/modifications as indicated. He is accompanied by his spouse Jes.    Interval History:   Mr. Mendoza initially presented to our office 2022, followed by Dr. Mario Ontiveros, for the evaluation of memory loss.  He had noted difficulty with short-term memory for the past couple years prior to presentation.  He taught a Bible study class and noted over time that he had significant difficulty recalling information during his teachings.  It is also been noted he forgot about chores quite easily and then would recall a few days later for certain things needed to be completed.  He would have issues where he forgot his glasses cell phone at home and there were a few instances where he left credit cards at restaurants.  He denied any history of severe head injuries and there is no family history of dementia.  There is no associated focal weakness or numbness of his arms or legs, no balance or discoordination issues, no visual or speech disturbances.  Dr. Ontiveros scheduled him for a brain MRI which was completed 2022 and showed no acute intracranial abnormality however mild small vessel disease and focal hemosiderin staining in the superior left frontal sulcus likely secondary to remote prior tiny localized subarachnoid hemorrhage were noted.  He also had a neuropsychological evaluation with [facility] in 2022 which showed mild  "cognitive impairment and an unspecified anxiety disorder.  He was seen again in February 2023 and at that time he was sent for cognitive rehabilitation and therapy as well as the titration of his Aricept to 10 mg daily.  Unfortunately he was not able to tolerate Aricept and it was discontinued in March.  At his May 30 follow-up he was starting the process of Namenda titration beginning at 5 mg daily with titration to 10 mg twice daily however due to increased lethargy and being \"out of it\" he reduced to taking 5 mg daily per Dr. Ontiveros's recommendation with a goal of increasing to 5 mg in the morning and 5 mg at night after 1 month.    He presents today for follow-up and reports feeling no better or worse.  He endorses some mood changes, feeling less patient times.  He is still doing finances but starting to notice he's less quick, recently missed a bill payment.  He is still able to do ADLs independently but is wearing the same clothes for days at a time per his wife.  He is also managing his own medications but forgets to take his nighttime meds every now and then.  He is still driving and reports no accidents/tickets.  His wife corroborates this.  He has experienced no falls says that his right knee gives out on him and he has associated pain sometimes leads to feeling like he stumbling.  He is not able to be as physically active as he would like because of this; he used to walk for physical activity daily.  He says that he lost 15-20lbs over the past 6-8 months.  He reports that his sleep is okay he is not having any hallucinations or nightmares nor frequent awakenings. He says that he stays well-hydrated and feels like he eats a well-balanced diet.  His biggest concern today is that he is started experiencing a new headache, described as pressure \"like a dull ache\" that started around the time he started taking memantine. He says that he feels it mostly behind both of his eyes. He experiences it daily, some days " it occurs several times throughout the day. He denies any dizziness or visual disturbances, no unilateral or focal weakness of an extremity, no speech deficits or facial droop. He doesn't take anything for it. He says that he doesn't check his blood pressure at home.          Review of Systems   Neurological:  Positive for headaches. Negative for dizziness, tremors, seizures, syncope, facial asymmetry, speech difficulty, weakness, light-headedness and numbness.   Psychiatric/Behavioral:  Positive for decreased concentration. Negative for agitation, behavioral problems, confusion, dysphoric mood, hallucinations, self-injury, sleep disturbance and suicidal ideas. The patient is not nervous/anxious and is not hyperactive.       The following portions of the patient's history were reviewed and updated as appropriate: allergies, current medications, past family history, past medical history, past social history, past surgical history and problem list.    Current Medications:   Current Outpatient Medications:     aspirin 81 MG tablet, Take 1 tablet by mouth Daily., Disp: , Rfl:     Coenzyme Q10 (COQ10) 400 MG capsule, Take 1 capsule by mouth daily. Take with a meal., Disp: , Rfl:     memantine (NAMENDA) 5 MG tablet, Take 1 tablet by mouth Daily., Disp: 30 tablet, Rfl: 5    metoprolol succinate XL (TOPROL-XL) 50 MG 24 hr tablet, TAKE 1 TABLET EVERY DAY FOR BLOOD PRESSURE AND HEART, Disp: 90 tablet, Rfl: 3    nystatin-triamcinolone (MYCOLOG II) 920504-3.1 UNIT/GM-% cream, Apply to affected area twice daily as directed, Disp: 30 g, Rfl: 1    rosuvastatin (CRESTOR) 20 MG tablet, TAKE 1 TABLET EVERY DAY FOR HIGH CHOLESTEROL, Disp: 90 tablet, Rfl: 3    vitamin D3 125 MCG (5000 UT) capsule capsule, 1 by mouth daily as directed, Disp: 30 capsule, Rfl:   **I did not stop or change the above medications.  Patient's medication list was updated to reflect medications they have reported as currently taking, including medication  "changes made by other providers.    Objective   Vital Signs:  /78   Pulse 72   Ht 175.3 cm (69\")   Wt 71.2 kg (157 lb)   SpO2 100%   BMI 23.18 kg/m²   Body mass index is 23.18 kg/m².    Physical Exam:  GENERAL: NAD, alert  HEENT: Normocephalic, atraumatic   COR: RRR  Resp: Even and unlabored  Extremities: No edema  Skin: Warm and dry  Psychiatric: Normal mood and affect    Neurological:   MS: AOx3, recent memory impaired, remote memory intact, normal attention/concentration, language intact, no neglect, normal fund of knowledge. Recall 1/3. MoCA 26/30 today.  CN: visual acuity grossly normal, PERRL, EOMI, no nystagmus, no facial droop, no dysarthria, hearing symmetric, tongue midline, bilateral shoulder shrug symmetric  Motor: Normal tone. No tremor noted.   Shoulder abductors 5/5  Elbow flexors 5/5  Elbow extensors 5/5   Hip flexors 4+/5  Knee extensors 5/5  Hamstring strength 5/5  Dorsiflexors 5/5  Plantar flexors 5/5  Sensory: Intact to light touch in all four ext.  Coordination: No dysmetria finger to nose   Rapid alternating movements: Normal finger to thumb tap  Gait and station: Normal gait and station    Result Review :  The following data was reviewed by: JASS Feldman on 10/02/2023:  Laboratory Results:         Lab Results   Component Value Date    WBC 3.28 (L) 03/27/2023    HGB 13.6 03/27/2023    HCT 41.3 03/27/2023    MCV 85.0 03/27/2023     (L) 03/27/2023     Lab Results   Component Value Date    GLUCOSE 91 03/27/2023    BUN 20 03/27/2023    CREATININE 1.04 03/27/2023    EGFRIFNONA 73 11/26/2021    EGFRIFAFRI 76 03/09/2020    BCR 19.2 03/27/2023    K 4.6 03/27/2023    CO2 29.0 03/27/2023    CALCIUM 9.8 03/27/2023    PROTENTOTREF 6.6 03/27/2023    ALBUMIN 5.0 03/27/2023    LABIL2 3.1 03/27/2023    AST 16 03/27/2023    ALT 12 03/27/2023     No results found for: HGBA1C  No results found for: CHOL  Lab Results   Component Value Date    HDL 34 (L) 04/27/2015     Lab Results "   Component Value Date    LDL 46 07/26/2016    LDL 28 04/27/2015     Lab Results   Component Value Date    TRIG 98 03/27/2023    TRIG 117 03/18/2022    TRIG 118 03/17/2021     No results found for: RPR  Lab Results   Component Value Date    TSH 2.660 03/27/2023     No results found for: PGEDAROC19    Data reviewed : Radiologic studies   and Consultant notes             Assessment and Plan   Diagnoses and all orders for this visit:    1. Mild cognitive impairment (Primary)    2. New daily persistent headache  -     MRI Brain With & Without Contrast; Future      MCI - Continue memantine 5mg, increase to twice daily. Discussed lifestyle modifications for cognitive health. Consider re-checking MoCA at next visit.   Recommended Lifestyle Modifications:   -Regular physical activity (ie [employer] programs)  -Regular social activity (ie clubs, Yarsan[employer] groups, etc)  -Regular mental stimulation (ie puzzles, cross-words, crafts, etc)  -Healthy diet (ie Mediterranean or DASH diet)    2. New persistent headache - check MRI brain w/wo for any causation. Patient may take magnesium glycinate supplements 500mg daily, stay well-hydrated, decrease caffeine intake, reduce stress, participate in physical activity.     Call 911 for stroke symptoms.    We will see Leeroy back in 6 months, sooner if symptoms warrant.     JASS Feldman  [facility]   10/02/23     I spent 30 minutes caring for Sergey on this date of service. This time includes time spent by me in the following activities:preparing for the visit, reviewing tests, obtaining and/or reviewing a separately obtained history, performing a medically appropriate examination and/or evaluation , counseling and educating the patient/family/caregiver, ordering medications, tests, or procedures, referring and communicating with other health care professionals , documenting information in the medical record, independently interpreting results and communicating that information  with the patient/family/caregiver, and care coordination  Follow Up   Return in about 6 months (around 4/2/2024).  Patient was given instructions and counseling regarding his condition or for health maintenance advice. Please see specific information pulled into the AVS if appropriate.

## 2023-10-02 NOTE — PATIENT INSTRUCTIONS
Recommended Lifestyle Modifications:   -Regular physical activity (ie [employer] programs)  -Regular social activity (ie clubs, [employer] groups, etc)  -Regular mental stimulation (ie puzzles, cross-words, crafts, etc)  -Healthy diet (ie Mediterranean or DASH diet)

## 2023-10-04 ENCOUNTER — HOSPITAL ENCOUNTER (OUTPATIENT)
Dept: SPEECH THERAPY | Facility: HOSPITAL | Age: 83
Setting detail: THERAPIES SERIES
Discharge: HOME | End: 2023-10-04
Payer: MEDICARE

## 2023-10-04 DIAGNOSIS — G31.84 MILD COGNITIVE IMPAIRMENT WITH MEMORY LOSS: Primary | ICD-10-CM

## 2023-10-04 DIAGNOSIS — R41.841 COGNITIVE COMMUNICATION DEFICIT: ICD-10-CM

## 2023-10-04 PROCEDURE — 97129 THER IVNTJ 1ST 15 MIN: CPT

## 2023-10-04 PROCEDURE — 97130 THER IVNTJ EA ADDL 15 MIN: CPT

## 2023-10-04 NOTE — THERAPY TREATMENT NOTE
Outpatient Speech Language Pathology   Adult Speech Language Cognitive Treatment Note  [facility]     Patient Name: Sergey Mendoza  : 1940  MRN: [id number]  Today's Date: 10/4/2023         Visit Date: 10/04/2023   Patient Active Problem List   Diagnosis    Benign essential hypertension    Benign prostatic hypertrophy, 2008--negative biopsy.    Carotid artery plaque, 2021--mild bilateral.  09/10/2018--mild bilateral. 2016--mild bilateral carotid artery plaque.    Coronary artery disease involving native coronary artery of native heart without angina pectoris    Diverticulosis of colon    Gastroesophageal reflux disease without esophagitis    Generalized osteoarthritis of multiple sites    Hyperlipidemia    History of myocardial infarction, 10/08/2010--patient presented with an anterior myocardial infarction.    Primary osteoarthritis of right knee    History of PTCA, 10/08/2010--anterior MI.  EF 40%.  PTCA with drug-eluting stent proximal LAD.  10% LM.  100% diagonal 1.  30% diffuse circumflex.  30% diffuse RCA.  --EF 64%    Vitamin D deficiency    Elevated PSA    Internal hemorrhoids    Therapeutic drug monitoring    Splenomegaly    Multiple actinic keratoses    Asymptomatic cholelithiasis    Microscopic hematuria    Mild cognitive impairment with memory loss    Thrombocytopenia    History of 2019 novel coronavirus disease (COVID-19)    History of colon polyps, 2022--tubular adenoma x2.          Visit Dx:    ICD-10-CM ICD-9-CM   1. Mild cognitive impairment with memory loss  G31.84 331.83   2. 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  -BB        Memory Goals    Patient’s memory skills will be enhanced as reported by patient by utilizing internal memory strategies to recall up to 3 pieces of information after a 5- minute delay 80%:  -BB     Status: Patient’s memory skills will be enhanced as reported by patient by utilizing internal memory strategies to recall up to 3 pieces of information after a 5- minute delay Progress slower than expected  -BB     Comments: Patient’s memory skills will be enhanced as reported by patient by utilizing internal memory strategies to recall up to 3 pieces of information after a 5- minute delay Delayed recall of 3-4 items with strategies (eg, repetition, elaborative encoding, multimodal strategy with writing and rephrasing): 0% acc  -BB     Patient’s memory skills will be enhanced as reported by patient by using external memory aides 80%:  -BB     Status: Patient’s memory skills will be enhanced as reported by patient by using external memory aides Progress slower than expected  -BB     Comments: Patient’s memory skills will be enhanced as reported by patient by using external memory aides Delayed recall of key information after a delay with external memory aid: 60% acc with prompt to utilize external memory aid. Pt demonstrated difficulties with chunking information.  -BB               User Key  (r) = Recorded By, (t) = Taken By, (c) = Cosigned By      Initials Name Provider Type    Easton Crenshaw, SLP Speech and Language Pathologist                           Time Calculation:   SLP Start Time: 1100  SLP Stop Time: 1200  SLP Time Calculation (min): 60 min  Timed Charges  82228-DQ Dev of Cogn Skills Initial Minutes: 15  73953-FX Dev of Cogn Skills Add Minutes: 45  Total Minutes  Timed Charges Total Minutes: 60   Total Minutes: 60    Therapy Charges for Today       Code Description Service Date Service Provider Modifiers Qty    [id number]  ST DEV OF COGN SKILLS INITIAL 15 MIN  10/4/2023 Easton Cronin, SLP KX 1    [id number]  ST DEV OF COGN SKILLS EACH ADDT'L 15 MIN 10/4/2023 Easton Cronin, SLP KX 3                     Easton Cronin, SLP  10/4/2023

## 2023-10-11 ENCOUNTER — HOSPITAL ENCOUNTER (OUTPATIENT)
Dept: SPEECH THERAPY | Facility: HOSPITAL | Age: 83
Setting detail: THERAPIES SERIES
Discharge: HOME | End: 2023-10-11
Payer: MEDICARE

## 2023-10-11 DIAGNOSIS — R41.841 COGNITIVE COMMUNICATION DEFICIT: ICD-10-CM

## 2023-10-11 DIAGNOSIS — G31.84 MILD COGNITIVE IMPAIRMENT WITH MEMORY LOSS: Primary | ICD-10-CM

## 2023-10-11 PROCEDURE — 97129 THER IVNTJ 1ST 15 MIN: CPT

## 2023-10-11 PROCEDURE — 97130 THER IVNTJ EA ADDL 15 MIN: CPT
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
## 2023-10-18 ENCOUNTER — HOSPITAL ENCOUNTER (OUTPATIENT)
Dept: SPEECH THERAPY | Facility: HOSPITAL | Age: 83
Setting detail: THERAPIES SERIES
Discharge: HOME | End: 2023-10-18
Payer: MEDICARE

## 2023-10-18 DIAGNOSIS — G31.84 MILD COGNITIVE IMPAIRMENT WITH MEMORY LOSS: Primary | ICD-10-CM

## 2023-10-18 DIAGNOSIS — R41.841 COGNITIVE COMMUNICATION DEFICIT: ICD-10-CM

## 2023-10-18 PROCEDURE — 97130 THER IVNTJ EA ADDL 15 MIN: CPT

## 2023-10-18 PROCEDURE — 97129 THER IVNTJ 1ST 15 MIN: CPT

## 2023-10-18 NOTE — THERAPY DISCHARGE NOTE
Outpatient Speech Language Pathology   Adult Speech Language Cognitive Treatment Note/Discharge Summary  [facility]     Patient Name: Sergey Mendoza  : 1940  MRN: [id number]  Today's Date: 10/18/2023         Visit Date: 10/18/2023   Patient Active Problem List   Diagnosis    Benign essential hypertension    Benign prostatic hypertrophy, 2008--negative biopsy.    Carotid artery plaque, 2021--mild bilateral.  09/10/2018--mild bilateral. 2016--mild bilateral carotid artery plaque.    Coronary artery disease involving native coronary artery of native heart without angina pectoris    Diverticulosis of colon    Gastroesophageal reflux disease without esophagitis    Generalized osteoarthritis of multiple sites    Hyperlipidemia    History of myocardial infarction, 10/08/2010--patient presented with an anterior myocardial infarction.    Primary osteoarthritis of right knee    History of PTCA, 10/08/2010--anterior MI.  EF 40%.  PTCA with drug-eluting stent proximal LAD.  10% LM.  100% diagonal 1.  30% diffuse circumflex.  30% diffuse RCA.  --EF 64%    Vitamin D deficiency    Elevated PSA    Internal hemorrhoids    Therapeutic drug monitoring    Splenomegaly    Multiple actinic keratoses    Asymptomatic cholelithiasis    Microscopic hematuria    Mild cognitive impairment with memory loss    Thrombocytopenia    History of 2019 novel coronavirus disease (COVID-19)    History of colon polyps, 2022--tubular adenoma x2.          Visit Dx:    ICD-10-CM ICD-9-CM   1. Mild cognitive impairment with memory loss  G31.84 331.83   2. Cognitive communication deficit  R41.841 799.52                            SLP OP Goals       Row Name 10/18/23 1100          Subjective Comments    Subjective Comments Pt agreeable to discharge.  -BB        Memory Goals    Memory LTG's Patient and family will implement compensatory strategies to maximize patient’s Memory function so patient can continue to participate in  daily activities;Patient will be able to remember  information needed to participate in activities of daily living  -BB     Patient and family will implement compensatory strategies to maximize patient’s Memory function so patient can continue to participate in daily activities 80%:  -BB     Status: Patient and family will implement compensatory strategies to maximize patient’s Memory function so patient can continue to participate in daily activities Achieved  -BB     Comments: Patient and family will implement compensatory strategies to maximize patient’s Memory function so patient can continue to participate in daily activities Visualization and chunking strategies: pt demonstrated ability to apply these  -BB     Status: Patient will be able to remember  information needed to participate in activities of daily living Discontinued;Progress slower than expected  -BB     Comments: Patient will be able to remember  information needed to participate in activities of daily living Pt reports ongoing difficulties at home when completing ADLs  -BB     Memory STG's Patient’s memory skills will be enhanced as reported by patient by utilizing internal memory strategies to recall up to 3 pieces of information after a 5- minute delay;Patient’s memory skills will be enhanced as reported by patient by using external memory aides  -BB     Patient’s memory skills will be enhanced as reported by patient by utilizing internal memory strategies to recall up to 3 pieces of information after a 5- minute delay 80%:  -BB     Status: Patient’s memory skills will be enhanced as reported by patient by utilizing internal memory strategies to recall up to 3 pieces of information after a 5- minute delay Achieved  -BB     Comments: Patient’s memory skills will be enhanced as reported by patient by utilizing internal memory strategies to recall up to 3 pieces of information after a 5- minute delay Delayed recall of 4 items with strategies  (chunking and visualization): 88% acc wo cues and 96% acc w repetition. Delayed recall of story read aloud: 80% acc with strategies (visualization and rehearsal) after 3 minute delay and 50% acc after 6 minute delay  -BB     Patient’s memory skills will be enhanced as reported by patient by using external memory aides 80%:  -BB     Status: Patient’s memory skills will be enhanced as reported by patient by using external memory aides Progress slower than expected;Discontinued  -BB     Comments: Patient’s memory skills will be enhanced as reported by patient by using external memory aides Difficulties with prospective memory. Due to time limitations, it has been difficult to develop procedural memory to improve prospective recall. Pt unaware of previously written information to assist as memory aid due to time limitations.  -BB               User Key  (r) = Recorded By, (t) = Taken By, (c) = Cosigned By      Initials Name Provider Type    Easton Crenshaw, SLP Speech and Language Pathologist                                 Time Calculation:   SLP Start Time: 1100  SLP Stop Time: 1145  SLP Time Calculation (min): 45 min  Timed Charges  06832-MC Dev of Cogn Skills Initial Minutes: 15  95266-PW Dev of Cogn Skills Add Minutes: 30  Total Minutes  Timed Charges Total Minutes: 45   Total Minutes: 45    Therapy Charges for Today       Code Description Service Date Service Provider Modifiers Qty    23675153812 HC ST DEV OF COGN SKILLS INITIAL 15 MIN 10/18/2023 Easton Cronin, SLP KX 1    18934178580 HC ST DEV OF COGN SKILLS EACH ADDT'L 15 MIN 10/18/2023 Easton Cronin, SLP KX 2                   OP SLP Discharge Summary  Date of Discharge: 10/18/23  Reason for Discharge: identified goals partially met, no further expectation of functional progress  Progress Toward Achieving Short/long Term Goals: goals partially met within established timelines  Discharge Destination: home w/ assist      RADHIKA Gonzalez  10/18/2023

## 2023-10-25 ENCOUNTER — APPOINTMENT (OUTPATIENT)
Dept: SPEECH THERAPY | Facility: HOSPITAL | Age: 83
End: 2023-10-25
Payer: MEDICARE

## 2023-12-08 ENCOUNTER — OFFICE VISIT (OUTPATIENT)
Dept: INTERNAL MEDICINE | Facility: CLINIC | Age: 83
End: 2023-12-08
Payer: MEDICARE

## 2023-12-08 VITALS
DIASTOLIC BLOOD PRESSURE: 74 MMHG | SYSTOLIC BLOOD PRESSURE: 120 MMHG | HEART RATE: 76 BPM | OXYGEN SATURATION: 100 % | TEMPERATURE: 98.5 F | BODY MASS INDEX: 23.51 KG/M2 | HEIGHT: 69 IN | WEIGHT: 158.7 LBS

## 2023-12-08 DIAGNOSIS — J06.9 ACUTE URI: Primary | ICD-10-CM

## 2023-12-08 PROCEDURE — 3074F SYST BP LT 130 MM HG: CPT | Performed by: FAMILY MEDICINE

## 2023-12-08 PROCEDURE — 3078F DIAST BP <80 MM HG: CPT | Performed by: FAMILY MEDICINE

## 2023-12-08 PROCEDURE — 99213 OFFICE O/P EST LOW 20 MIN: CPT | Performed by: FAMILY MEDICINE

## 2023-12-08 RX ORDER — AZITHROMYCIN 250 MG/1
TABLET, FILM COATED ORAL
Qty: 6 TABLET | Refills: 0 | Status: SHIPPED | OUTPATIENT
Start: 2023-12-08

## 2023-12-08 NOTE — PROGRESS NOTES
"Chief Complaint  Cough (LAST 5 DAYS ) and Fatigue    Subjective        Sergey Mendoza presents to [facility]  Cough    Fatigue  Associated symptoms include coughing and fatigue.   Patient appointment for minimally productive cough for the last 5 days he was seen in urgent care instructed to take Mucinex he does not think he is getting better soon enough here for follow-up no fever sweats or chills  Sore throat some head congestion some nasal congestion feels pressure around his eyes  Open flu test negative through [facility]  Objective   Vital Signs:  /74   Pulse 76   Temp 98.5 °F (36.9 °C)   Ht 175.3 cm (69.02\")   Wt 72 kg (158 lb 11.2 oz)   SpO2 100%   BMI 23.43 kg/m²   Estimated body mass index is 23.43 kg/m² as calculated from the following:    Height as of this encounter: 175.3 cm (69.02\").    Weight as of this encounter: 72 kg (158 lb 11.2 oz).       BMI is within normal parameters. No other follow-up for BMI required.      Physical Exam  Vitals and nursing note reviewed.   Constitutional:       General: He is not in acute distress.     Appearance: Normal appearance. He is well-developed. He is not toxic-appearing or diaphoretic.   HENT:      Head: Normocephalic and atraumatic. Hair is normal.      Right Ear: External ear normal. No drainage, swelling or tenderness. Tympanic membrane is retracted.      Left Ear: External ear normal. No drainage, swelling or tenderness. Tympanic membrane is retracted.      Nose: Mucosal edema present.      Comments: No sinus tenderness     Mouth/Throat:      Mouth: No oral lesions.      Pharynx: Uvula midline. Posterior oropharyngeal erythema present. No oropharyngeal exudate or uvula swelling.   Eyes:      General: No scleral icterus.        Right eye: No discharge.         Left eye: No discharge.      Conjunctiva/sclera: Conjunctivae normal.      Pupils: Pupils are equal, round, and reactive to light.   Cardiovascular:      Rate and " Rhythm: Normal rate and regular rhythm.      Heart sounds: Normal heart sounds. No murmur heard.     No gallop.   Pulmonary:      Effort: No respiratory distress.      Breath sounds: Normal breath sounds. No stridor. No wheezing or rales.   Chest:      Chest wall: No tenderness.   Abdominal:      Palpations: Abdomen is soft.   Musculoskeletal:      Cervical back: Normal range of motion and neck supple.   Lymphadenopathy:      Cervical: No cervical adenopathy.   Skin:     General: Skin is warm and dry.      Findings: No rash.   Neurological:      Mental Status: He is alert and oriented to person, place, and time.      Motor: No abnormal muscle tone.   Psychiatric:         Behavior: Behavior normal.        Result Review :                   Assessment and Plan   Diagnoses and all orders for this visit:    1. Acute URI (Primary)    Other orders  -     azithromycin (Zithromax Z-Home) 250 MG tablet; Take 2 tablets by mouth on day 1, then 1 tablet daily on days 2-5  Dispense: 6 tablet; Refill: 0    Symptomatic treatment Z-Home if worsens through the weekend         Follow Up   Return if symptoms worsen or fail to improve, for Recheck.  Patient was given instructions and counseling regarding his condition or for health maintenance advice. Please see specific information pulled into the AVS if appropriate.

## 2024-02-09 ENCOUNTER — OFFICE VISIT (OUTPATIENT)
Dept: CARDIOLOGY | Facility: CLINIC | Age: 84
End: 2024-02-09
Payer: MEDICARE

## 2024-02-09 VITALS
SYSTOLIC BLOOD PRESSURE: 124 MMHG | HEIGHT: 69 IN | HEART RATE: 61 BPM | BODY MASS INDEX: 22.96 KG/M2 | WEIGHT: 155 LBS | DIASTOLIC BLOOD PRESSURE: 78 MMHG

## 2024-02-09 DIAGNOSIS — R06.02 EXERTIONAL SHORTNESS OF BREATH: Primary | ICD-10-CM

## 2024-02-09 DIAGNOSIS — I65.23 ATHEROSCLEROSIS OF BOTH CAROTID ARTERIES: ICD-10-CM

## 2024-02-09 DIAGNOSIS — I25.10 CORONARY ARTERY DISEASE INVOLVING NATIVE CORONARY ARTERY OF NATIVE HEART WITHOUT ANGINA PECTORIS: Chronic | ICD-10-CM

## 2024-02-09 PROCEDURE — 99214 OFFICE O/P EST MOD 30 MIN: CPT | Performed by: INTERNAL MEDICINE

## 2024-02-09 PROCEDURE — 93000 ELECTROCARDIOGRAM COMPLETE: CPT | Performed by: INTERNAL MEDICINE

## 2024-02-09 PROCEDURE — 3078F DIAST BP <80 MM HG: CPT | Performed by: INTERNAL MEDICINE

## 2024-02-09 PROCEDURE — 3074F SYST BP LT 130 MM HG: CPT | Performed by: INTERNAL MEDICINE
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
## 2024-02-19 RX ORDER — MEMANTINE HYDROCHLORIDE 5 MG/1
5 TABLET ORAL DAILY
Qty: 90 TABLET | Refills: 3 | Status: SHIPPED | OUTPATIENT
Start: 2024-02-19

## 2024-02-22 ENCOUNTER — HOSPITAL ENCOUNTER (OUTPATIENT)
Dept: CARDIOLOGY | Facility: HOSPITAL | Age: 84
Discharge: HOME | End: 2024-02-22
Payer: MEDICARE

## 2024-02-22 VITALS
DIASTOLIC BLOOD PRESSURE: 70 MMHG | SYSTOLIC BLOOD PRESSURE: 124 MMHG | HEART RATE: 64 BPM | OXYGEN SATURATION: 99 % | BODY MASS INDEX: 22.96 KG/M2 | WEIGHT: 155 LBS | HEIGHT: 69 IN

## 2024-02-22 DIAGNOSIS — R06.02 EXERTIONAL SHORTNESS OF BREATH: ICD-10-CM

## 2024-02-22 DIAGNOSIS — I65.23 ATHEROSCLEROSIS OF BOTH CAROTID ARTERIES: ICD-10-CM

## 2024-02-22 LAB
AORTIC ARCH: 2.8 CM
ASCENDING AORTA: 3.1 CM
BH CV ECHO MEAS - ACS: 2.15 CM
BH CV ECHO MEAS - AO MAX PG: 5.3 MMHG
BH CV ECHO MEAS - AO MEAN PG: 3 MMHG
BH CV ECHO MEAS - AO ROOT DIAM: 3.3 CM
BH CV ECHO MEAS - AO V2 MAX: 115 CM/SEC
BH CV ECHO MEAS - AO V2 VTI: 25 CM
BH CV ECHO MEAS - AVA(I,D): 2.47 CM2
BH CV ECHO MEAS - EDV(CUBED): 91.1 ML
BH CV ECHO MEAS - EDV(MOD-SP2): 58 ML
BH CV ECHO MEAS - EDV(MOD-SP4): 58 ML
BH CV ECHO MEAS - EF(MOD-BP): 56.5 %
BH CV ECHO MEAS - EF(MOD-SP2): 58.6 %
BH CV ECHO MEAS - EF(MOD-SP4): 56.9 %
BH CV ECHO MEAS - ESV(CUBED): 16.4 ML
BH CV ECHO MEAS - ESV(MOD-SP2): 24 ML
BH CV ECHO MEAS - ESV(MOD-SP4): 25 ML
BH CV ECHO MEAS - FS: 43.6 %
BH CV ECHO MEAS - IVS/LVPW: 1 CM
BH CV ECHO MEAS - IVSD: 1.1 CM
BH CV ECHO MEAS - LAT PEAK E' VEL: 8.3 CM/SEC
BH CV ECHO MEAS - LV DIASTOLIC VOL/BSA (35-75): 31.3 CM2
BH CV ECHO MEAS - LV MASS(C)D: 175 GRAMS
BH CV ECHO MEAS - LV MAX PG: 4.4 MMHG
BH CV ECHO MEAS - LV MEAN PG: 2 MMHG
BH CV ECHO MEAS - LV SYSTOLIC VOL/BSA (12-30): 13.5 CM2
BH CV ECHO MEAS - LV V1 MAX: 105 CM/SEC
BH CV ECHO MEAS - LV V1 VTI: 19.7 CM
BH CV ECHO MEAS - LVIDD: 4.5 CM
BH CV ECHO MEAS - LVIDS: 2.5 CM
BH CV ECHO MEAS - LVOT AREA: 3.1 CM2
BH CV ECHO MEAS - LVOT DIAM: 2 CM
BH CV ECHO MEAS - LVPWD: 1.1 CM
BH CV ECHO MEAS - MED PEAK E' VEL: 6 CM/SEC
BH CV ECHO MEAS - MV A DUR: 0.12 SEC
BH CV ECHO MEAS - MV A MAX VEL: 72.5 CM/SEC
BH CV ECHO MEAS - MV DEC SLOPE: 283 CM/SEC2
BH CV ECHO MEAS - MV DEC TIME: 0.22 SEC
BH CV ECHO MEAS - MV E MAX VEL: 47.4 CM/SEC
BH CV ECHO MEAS - MV E/A: 0.65
BH CV ECHO MEAS - MV MAX PG: 3.1 MMHG
BH CV ECHO MEAS - MV MEAN PG: 1.05 MMHG
BH CV ECHO MEAS - MV P1/2T: 81.5 MSEC
BH CV ECHO MEAS - MV V2 VTI: 28.5 CM
BH CV ECHO MEAS - MVA(P1/2T): 2.7 CM2
BH CV ECHO MEAS - MVA(VTI): 2.17 CM2
BH CV ECHO MEAS - PA ACC TIME: 0.14 SEC
BH CV ECHO MEAS - PA V2 MAX: 90.9 CM/SEC
BH CV ECHO MEAS - PI END-D VEL: 108.6 CM/SEC
BH CV ECHO MEAS - PULM A REVS DUR: 0.12 SEC
BH CV ECHO MEAS - PULM A REVS VEL: 27.8 CM/SEC
BH CV ECHO MEAS - PULM DIAS VEL: 36.3 CM/SEC
BH CV ECHO MEAS - PULM S/D: 1.48
BH CV ECHO MEAS - PULM SYS VEL: 53.8 CM/SEC
BH CV ECHO MEAS - QP/QS: 0.78
BH CV ECHO MEAS - RAP SYSTOLE: 3 MMHG
BH CV ECHO MEAS - RV MAX PG: 1.51 MMHG
BH CV ECHO MEAS - RV V1 MAX: 61.5 CM/SEC
BH CV ECHO MEAS - RV V1 VTI: 14.8 CM
BH CV ECHO MEAS - RVOT DIAM: 2.04 CM
BH CV ECHO MEAS - RVSP: 16 MMHG
BH CV ECHO MEAS - SI(MOD-SP2): 18.3 ML/M2
BH CV ECHO MEAS - SI(MOD-SP4): 17.8 ML/M2
BH CV ECHO MEAS - SUP REN AO DIAM: 2.4 CM
BH CV ECHO MEAS - SV(LVOT): 61.8 ML
BH CV ECHO MEAS - SV(MOD-SP2): 34 ML
BH CV ECHO MEAS - SV(MOD-SP4): 33 ML
BH CV ECHO MEAS - SV(RVOT): 48.3 ML
BH CV ECHO MEAS - TAPSE (>1.6): 1.69 CM
BH CV ECHO MEAS - TR MAX PG: 12.8 MMHG
BH CV ECHO MEAS - TR MAX VEL: 179.2 CM/SEC
BH CV ECHO MEASUREMENTS AVERAGE E/E' RATIO: 6.63
BH CV XLRA - RV BASE: 2.9 CM
BH CV XLRA - RV LENGTH: 6.9 CM
BH CV XLRA - RV MID: 2.9 CM
BH CV XLRA - TDI S': 11.6 CM/SEC
BH CV XLRA MEAS LEFT DIST CCA EDV: -15.6 CM/SEC
BH CV XLRA MEAS LEFT DIST CCA PSV: -73.7 CM/SEC
BH CV XLRA MEAS LEFT DIST ICA EDV: -18.7 CM/SEC
BH CV XLRA MEAS LEFT DIST ICA PSV: -61.6 CM/SEC
BH CV XLRA MEAS LEFT ICA/CCA RATIO: 0.75
BH CV XLRA MEAS LEFT MID CCA EDV: 23.4 CM/SEC
BH CV XLRA MEAS LEFT MID CCA PSV: 99.6 CM/SEC
BH CV XLRA MEAS LEFT MID ICA EDV: -16.4 CM/SEC
BH CV XLRA MEAS LEFT MID ICA PSV: -55.4 CM/SEC
BH CV XLRA MEAS LEFT PROX CCA EDV: 19.1 CM/SEC
BH CV XLRA MEAS LEFT PROX CCA PSV: 122.2 CM/SEC
BH CV XLRA MEAS LEFT PROX ECA PSV: -59.9 CM/SEC
BH CV XLRA MEAS LEFT PROX ICA EDV: -12.4 CM/SEC
BH CV XLRA MEAS LEFT PROX ICA PSV: -52.6 CM/SEC
BH CV XLRA MEAS LEFT PROX SCLA PSV: 77.1 CM/SEC
BH CV XLRA MEAS LEFT VERTEBRAL A PSV: -30.4 CM/SEC
BH CV XLRA MEAS RIGHT DIST CCA EDV: 12.4 CM/SEC
BH CV XLRA MEAS RIGHT DIST CCA PSV: 73.3 CM/SEC
BH CV XLRA MEAS RIGHT DIST ICA EDV: -14.8 CM/SEC
BH CV XLRA MEAS RIGHT DIST ICA PSV: -52.8 CM/SEC
BH CV XLRA MEAS RIGHT ICA/CCA RATIO: -0.84
BH CV XLRA MEAS RIGHT MID CCA EDV: 19.9 CM/SEC
BH CV XLRA MEAS RIGHT MID CCA PSV: 80.8 CM/SEC
BH CV XLRA MEAS RIGHT MID ICA EDV: -16.5 CM/SEC
BH CV XLRA MEAS RIGHT MID ICA PSV: -61.7 CM/SEC
BH CV XLRA MEAS RIGHT PROX CCA EDV: -16.2 CM/SEC
BH CV XLRA MEAS RIGHT PROX CCA PSV: -76.4 CM/SEC
BH CV XLRA MEAS RIGHT PROX ECA PSV: -67.1 CM/SEC
BH CV XLRA MEAS RIGHT PROX ICA EDV: -13 CM/SEC
BH CV XLRA MEAS RIGHT PROX ICA PSV: -61.3 CM/SEC
BH CV XLRA MEAS RIGHT PROX SCLA PSV: 88.2 CM/SEC
BH CV XLRA MEAS RIGHT VERTEBRAL A PSV: -34.6 CM/SEC
LEFT ATRIUM VOLUME INDEX: 19.9 ML/M2
SINUS: 3.1 CM
STJ: 2.5 CM

## 2024-02-22 PROCEDURE — 93306 TTE W/DOPPLER COMPLETE: CPT

## 2024-02-22 PROCEDURE — 93880 EXTRACRANIAL BILAT STUDY: CPT

## 2024-02-26 ENCOUNTER — TELEPHONE (OUTPATIENT)
Dept: CARDIOLOGY | Facility: CLINIC | Age: 84
End: 2024-02-26
Payer: MEDICARE

## 2024-02-26 NOTE — PROGRESS NOTES
Please notify Mr. Mendoza that echocardiogram shows an normal heart and valve function.  Let me know if he has any questions otherwise follow-up in cardiac clinic in 6 months.  Thank you

## 2024-02-26 NOTE — PROGRESS NOTES
Please notify Mr. Mendoza that carotid Doppler shows mild plaque in the carotid arteries that has not changed much from prior ultrasounds.  Let me know if he has any further questions.  Thank you

## 2024-02-26 NOTE — TELEPHONE ENCOUNTER
----- Message from Kitty West II, MA sent at 2/26/2024  9:57 AM EST -----    ----- Message -----  From: Natalio Morales MD  Sent: 2/26/2024   9:40 AM EST  To: Kitty West II, MA    Please notify Mr. Mendoza that echocardiogram shows an normal heart and valve function.  Let me know if he has any questions otherwise follow-up in cardiac clinic in 6 months.  Thank you

## 2024-02-26 NOTE — TELEPHONE ENCOUNTER
Called and left VM, will continue to try to reach pt.    HUB- please put patient straight through to triage    Erendira Perez, RN  Triage RN  02/26/24 10:26 EST

## 2024-02-27 NOTE — TELEPHONE ENCOUNTER
Called and left VM, will continue to try to reach pt.    HUB- please put patient straight through to triage    Erendira Perez, RN  Triage RN  02/27/24 10:07 EST

## 2024-02-28 NOTE — TELEPHONE ENCOUNTER
Called and left VM, will continue to try to reach pt.    HUB- please put patient straight through to triage    Erendira Perez, RN  Triage RN  02/28/24 09:03 EST

## 2024-04-03 ENCOUNTER — OFFICE VISIT (OUTPATIENT)
Dept: NEUROLOGY | Facility: CLINIC | Age: 84
End: 2024-04-03
Payer: MEDICARE

## 2024-04-03 VITALS
HEART RATE: 89 BPM | SYSTOLIC BLOOD PRESSURE: 122 MMHG | OXYGEN SATURATION: 96 % | DIASTOLIC BLOOD PRESSURE: 60 MMHG | HEIGHT: 69 IN | WEIGHT: 158 LBS | BODY MASS INDEX: 23.4 KG/M2

## 2024-04-03 DIAGNOSIS — R51.9 CHRONIC NONINTRACTABLE HEADACHE, UNSPECIFIED HEADACHE TYPE: ICD-10-CM

## 2024-04-03 DIAGNOSIS — G31.84 MILD COGNITIVE IMPAIRMENT WITH MEMORY LOSS: Primary | Chronic | ICD-10-CM

## 2024-04-03 DIAGNOSIS — G89.29 CHRONIC NONINTRACTABLE HEADACHE, UNSPECIFIED HEADACHE TYPE: ICD-10-CM

## 2024-04-03 RX ORDER — RIVASTIGMINE TARTRATE 1.5 MG/1
1.5 CAPSULE ORAL 2 TIMES DAILY
Qty: 60 CAPSULE | Refills: 5 | Status: SHIPPED | OUTPATIENT
Start: 2024-04-03

## 2024-04-03 NOTE — PATIENT INSTRUCTIONS
Start over the counter Tylenol for head discomfort. If this alleviates discomfort, great.   If it doesn't alleviate discomfort, discontinue memantine 5 mg to see if this alleviates discomfort.   Call office to report findings.     Exelon: Take 1 capsule daily for 1 week. Then after 1 week take 1 capsule twice a day.     Recommended Lifestyle Modifications:   -Regular physical activity (ie [email or web address] programs)  -Regular social activity (ie clubs, [employer] groups, etc)  -Regular mental stimulation (ie puzzles, cross-words, crafts, etc)  -Healthy diet (ie Mediterranean or DASH diet)

## 2024-04-03 NOTE — PROGRESS NOTES
DOS: 4/3/2024  NAME: Sergey Mendzoa   : 1940  PCP: Ramesh Blake MD    No chief complaint on file.     SUBJECTIVE  Neurological Problem:  83 y.o. RHW male with a past medical history of  hypertension, hyperlipidemia, BPH, elevated PSA, osteoarthritis, GERD, CAD, MI (2010, stent x1), MCI. They are seen in follow up today for mild cognitive impairment. A summary of the history was taken from the previous note with additions/modifications as indicated. He is accompanied by his spouse.    Interval History:   Mr. Mendoza has been followed by Dr. Ontiveros and now myself for mild cognitive impairment.  He was seen by [facility] for neuropsychological evaluation in 2022 which showed mild cognitive impairment and an unspecified anxiety disorder.  He attended cognitive rehabilitation and therapy in 2023. He was tried and unable to tolerate donepezil.  He has also been tried on memantine, at last visit increased to 10 mg daily, unable to tolerate 10 mg twice daily due to lethargy.  At last visit he reported a persistent dull ache behind his eyes that radiated to his posterior head.  A repeat MRI brain was ordered at last visit in October however it has not been completed per review of chart.    He presents today in follow-up and reports that he was unable to tolerate 5 mg of memantine twice daily due to lethargy.  He also reports after taking the dose he experiences discomfort behind his eyes, described as a dullness that radiates to the back of his head.  He denies any light or sound sensitivity and no nausea.  He has not taken any over-the-counter medications for it.  He states that he only drinks water throughout the day however his spouse says he does not drink much.  He states this dull discomfort dissipates after napping however he does not feel the need to take a nap to make it go away.  He denies any focal or unilateral symptoms such as visual or speech deficit, facial droop or  "difficulty swallowing, weakness/numbness to one side of the body.  He and his spouse say that he was reevaluated by [facility] within the past 6 to 8 months, feels like it was in the fall, and states they were told by the doctor that Mr. Mendoza is \"holding his own\" in regard to his memory.  His spouse states she feels his short-term memory is slightly worse, patient states he cannot tell a difference.  He continues to drive and has not gotten lost nor has he had any tickets/accidents/near misses.  He continues to be independent of his ADLs and handles his own medications.  His spouse is taking over the financials due to some over payments of accounts and some missed payments.  He denies leaving the stove/oven on and says he has not left the water running.  He has misplaced some items like his glasses, keys or wallet however he is able to find them.  He denies any tremor or hallucinations.  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  He denies any trouble with naming/recognizing familiar family and friends.  He does not currently participate in any daily mental stimulation.  He says due to knee and back pain it is difficult for him to participate in any regular physical activity.    Review of Systems   Constitutional:  Positive for fatigue.   Neurological:  Positive for weakness. Negative for dizziness, tremors, seizures, syncope, facial asymmetry, speech difficulty, light-headedness, numbness and headaches.   Psychiatric/Behavioral:  Positive for agitation. Negative for behavioral problems, confusion, decreased concentration, dysphoric mood, hallucinations, self-injury, sleep disturbance and suicidal ideas. The patient is not nervous/anxious and is not hyperactive.         The following portions of the patient's history were reviewed and updated as appropriate: allergies, current medications, past family history, past medical " "history, past social history, past surgical history and problem list.    Current Medications:   Current Outpatient Medications:     aspirin 81 MG tablet, Take 1 tablet by mouth Daily., Disp: , Rfl:     Coenzyme Q10 (COQ10) 400 MG capsule, Take 1 capsule by mouth daily. Take with a meal., Disp: , Rfl:     memantine (NAMENDA) 5 MG tablet, TAKE 1 TABLET EVERY DAY, Disp: 90 tablet, Rfl: 3    metoprolol succinate XL (TOPROL-XL) 50 MG 24 hr tablet, TAKE 1 TABLET EVERY DAY FOR BLOOD PRESSURE AND HEART, Disp: 90 tablet, Rfl: 3    rosuvastatin (CRESTOR) 20 MG tablet, TAKE 1 TABLET EVERY DAY FOR HIGH CHOLESTEROL, Disp: 90 tablet, Rfl: 3    vitamin D3 125 MCG (5000 UT) capsule capsule, 1 by mouth daily as directed, Disp: 30 capsule, Rfl:     nystatin-triamcinolone (MYCOLOG II) 444391-2.1 UNIT/GM-% cream, Apply to affected area twice daily as directed (Patient not taking: Reported on 2/9/2024), Disp: 30 g, Rfl: 1    rivastigmine (EXELON) 1.5 MG capsule, Take 1 capsule by mouth 2 (Two) Times a Day., Disp: 60 capsule, Rfl: 5  **I did not stop or change the above medications.  Patient's medication list was updated to reflect medications they have reported as currently taking, including medication changes made by other providers.    Objective   Vital Signs:  /60   Pulse 89   Ht 175.3 cm (69.02\")   Wt 71.7 kg (158 lb)   SpO2 96%   BMI 23.32 kg/m²   Body mass index is 23.32 kg/m².    Physical Exam   Physical Exam:  GENERAL: NAD, alert  HEENT: Normocephalic, atraumatic   Resp: Even and unlabored  Extremities: No edema  Skin: Warm and dry  Psychiatric: Normal mood and affect    Neurological:   MS: AO to self, place, year and date but unable to say month or season, recent/remote memory impaired, impaired attention/concentration, language intact, no neglect. Recall 1/3; MMSE 26/30, normal clock draw, 17 animals named in 1 min, naming fluency intact.   CN: visual acuity grossly normal, PERRL, EOMI, no nystagmus, no facial " "droop, no dysarthria, hearing symmetric, tongue midline, bilateral shoulder shrug symmetric  Motor: Normal tone. No tremor or abnormal movements noted.   Trapezius elevation: 5/5  Shoulder abductors 5/5  Elbow flexors 5/5  Elbow extensors 5/5   Left  2+  Right  2+  Hip flexors 5/5  Knee extensors 5/5  Hamstring strength 5/5  Dorsiflexors 5/5  Plantar flexors 5/5  Sensory: Intact to light touch in all four ext.  Cold Temperature, Vibration, Proprioception  Coordination: No dysmetria finger to nose   Rapid alternating movements: Normal finger to thumb tap  Gait and station: Normal gait and station    Result Review :  The following data was reviewed by: JASS Feldman on 04/03/2024:  Laboratory Results:         Lab Results   Component Value Date    WBC 3.28 (L) 03/27/2023    HGB 13.6 03/27/2023    HCT 41.3 03/27/2023    MCV 85.0 03/27/2023     (L) 03/27/2023     Lab Results   Component Value Date    GLUCOSE 91 03/27/2023    BUN 20 03/27/2023    CREATININE 1.04 03/27/2023    EGFRIFNONA 73 11/26/2021    EGFRIFAFRI 76 03/09/2020    BCR 19.2 03/27/2023    K 4.6 03/27/2023    CO2 29.0 03/27/2023    CALCIUM 9.8 03/27/2023    PROTENTOTREF 6.6 03/27/2023    ALBUMIN 5.0 03/27/2023    LABIL2 3.1 03/27/2023    AST 16 03/27/2023    ALT 12 03/27/2023     No results found for: \"HGBA1C\"  No results found for: \"CHOL\"  Lab Results   Component Value Date    HDL 34 (L) 04/27/2015     Lab Results   Component Value Date    LDL 46 07/26/2016    LDL 28 04/27/2015     Lab Results   Component Value Date    TRIG 98 03/27/2023    TRIG 117 03/18/2022    TRIG 118 03/17/2021     No results found for: \"RPR\"  Lab Results   Component Value Date    TSH 2.660 03/27/2023     No results found for: \"[id number]\"               Assessment and Plan   Diagnoses and all orders for this visit:    1. Mild cognitive impairment with memory loss (Primary)  -     rivastigmine (EXELON) 1.5 MG capsule; Take 1 capsule by mouth 2 (Two) Times a Day.  " Dispense: 60 capsule; Refill: 5    2. Chronic nonintractable headache, unspecified headache type      I feel that Leeroy is experiencing a chronic headache, not likely related to memantine however I would like him to try over-the-counter Tylenol and see if this eliminates the discomfort in his head.  If it does not, we will discontinue the use of memantine.  I would also like to start him on rivastigmine 1.5 mg, plan to titrate to twice daily.  We discussed lifestyle modifications for cognitive health including increased mental stimulation and regular physical activity.    We will see Leeroy back in 6 months, sooner if symptoms warrant.     JASS Feldman  [facility]   04/03/24       I spent 40 minutes caring for Sergey on this date of service. This time includes time spent by me in the following activities:preparing for the visit, reviewing tests, obtaining and/or reviewing a separately obtained history, performing a medically appropriate examination and/or evaluation , counseling and educating the patient/family/caregiver, ordering medications, tests, or procedures, referring and communicating with other health care professionals , documenting information in the medical record, independently interpreting results and communicating that information with the patient/family/caregiver, and care coordination  Follow Up   Return in about 6 months (around 10/3/2024).  Patient was given instructions and counseling regarding his condition or for health maintenance advice. Please see specific information pulled into the AVS if appropriate.       Dictated using Dragon Dictation

## 2024-04-04 ENCOUNTER — OFFICE VISIT (OUTPATIENT)
Dept: INTERNAL MEDICINE | Facility: CLINIC | Age: 84
End: 2024-04-04
Payer: MEDICARE

## 2024-04-04 VITALS
SYSTOLIC BLOOD PRESSURE: 122 MMHG | HEIGHT: 69 IN | BODY MASS INDEX: 23.25 KG/M2 | TEMPERATURE: 97.4 F | DIASTOLIC BLOOD PRESSURE: 64 MMHG | WEIGHT: 157 LBS | HEART RATE: 57 BPM | RESPIRATION RATE: 16 BRPM | OXYGEN SATURATION: 92 %

## 2024-04-04 DIAGNOSIS — Z86.16 HISTORY OF 2019 NOVEL CORONAVIRUS DISEASE (COVID-19): Chronic | ICD-10-CM

## 2024-04-04 DIAGNOSIS — I10 BENIGN ESSENTIAL HYPERTENSION: Chronic | ICD-10-CM

## 2024-04-04 DIAGNOSIS — I65.23 ATHEROSCLEROSIS OF BOTH CAROTID ARTERIES: Chronic | ICD-10-CM

## 2024-04-04 DIAGNOSIS — R97.20 ELEVATED PSA: Chronic | ICD-10-CM

## 2024-04-04 DIAGNOSIS — I25.2 HISTORY OF MYOCARDIAL INFARCTION: Chronic | ICD-10-CM

## 2024-04-04 DIAGNOSIS — Z51.81 THERAPEUTIC DRUG MONITORING: ICD-10-CM

## 2024-04-04 DIAGNOSIS — I25.10 CORONARY ARTERY DISEASE INVOLVING NATIVE CORONARY ARTERY OF NATIVE HEART WITHOUT ANGINA PECTORIS: Chronic | ICD-10-CM

## 2024-04-04 DIAGNOSIS — R31.29 MICROSCOPIC HEMATURIA: Chronic | ICD-10-CM

## 2024-04-04 DIAGNOSIS — L57.0 MULTIPLE ACTINIC KERATOSES: Chronic | ICD-10-CM

## 2024-04-04 DIAGNOSIS — Z00.00 ENCOUNTER FOR SUBSEQUENT ANNUAL WELLNESS VISIT (AWV) IN MEDICARE PATIENT: Primary | ICD-10-CM

## 2024-04-04 DIAGNOSIS — R16.1 SPLENOMEGALY: Chronic | ICD-10-CM

## 2024-04-04 DIAGNOSIS — E55.9 VITAMIN D DEFICIENCY: Chronic | ICD-10-CM

## 2024-04-04 DIAGNOSIS — Z86.010 HISTORY OF COLON POLYPS: Chronic | ICD-10-CM

## 2024-04-04 DIAGNOSIS — D69.6 THROMBOCYTOPENIA: Chronic | ICD-10-CM

## 2024-04-04 DIAGNOSIS — G31.84 MILD COGNITIVE IMPAIRMENT WITH MEMORY LOSS: Chronic | ICD-10-CM

## 2024-04-04 DIAGNOSIS — E78.2 MIXED HYPERLIPIDEMIA: Chronic | ICD-10-CM

## 2024-04-04 DIAGNOSIS — K21.9 GASTROESOPHAGEAL REFLUX DISEASE WITHOUT ESOPHAGITIS: Chronic | ICD-10-CM

## 2024-04-04 DIAGNOSIS — N40.1 BENIGN NON-NODULAR PROSTATIC HYPERPLASIA WITH LOWER URINARY TRACT SYMPTOMS: Chronic | ICD-10-CM

## 2024-04-04 DIAGNOSIS — Z98.61 HISTORY OF PTCA: Chronic | ICD-10-CM

## 2024-04-04 DIAGNOSIS — M15.9 GENERALIZED OSTEOARTHRITIS OF MULTIPLE SITES: Chronic | ICD-10-CM

## 2024-04-04 NOTE — PROGRESS NOTES
The ABCs of the Annual Wellness Visit  Subsequent Medicare Wellness Visit    Subjective      Sergey Mendoza is a 83 y.o. male who presents for a Subsequent Medicare Wellness Visit.    The following portions of the patient's history were reviewed and   updated as appropriate: allergies, current medications, past family history, past medical history, past social history, past surgical history, and problem list.    Compared to one year ago, the patient feels his physical   health is the same.    Compared to one year ago, the patient feels his mental   health is the same.    Recent Hospitalizations:  He was not admitted to the hospital during the last year.       Current Medical Providers:  Patient Care Team:  Ramesh Blake MD as PCP - General (Internal Medicine)    Outpatient Medications Prior to Visit   Medication Sig Dispense Refill    aspirin 81 MG tablet Take 1 tablet by mouth Daily.      Coenzyme Q10 (COQ10) 400 MG capsule Take 1 capsule by mouth daily. Take with a meal.      memantine (NAMENDA) 5 MG tablet TAKE 1 TABLET EVERY DAY 90 tablet 3    metoprolol succinate XL (TOPROL-XL) 50 MG 24 hr tablet TAKE 1 TABLET EVERY DAY FOR BLOOD PRESSURE AND HEART 90 tablet 3    rivastigmine (EXELON) 1.5 MG capsule Take 1 capsule by mouth 2 (Two) Times a Day. 60 capsule 5    rosuvastatin (CRESTOR) 20 MG tablet TAKE 1 TABLET EVERY DAY FOR HIGH CHOLESTEROL 90 tablet 3    vitamin D3 125 MCG (5000 UT) capsule capsule 1 by mouth daily as directed 30 capsule      No facility-administered medications prior to visit.       No opioid medication identified on active medication list. I have reviewed chart for other potential  high risk medication/s and harmful drug interactions in the elderly.        Aspirin is on active medication list. Aspirin use is indicated based on review of current medical condition/s. Pros and cons of this therapy have been discussed today. Benefits of this medication outweigh potential harm.  Patient has  "been encouraged to continue taking this medication.  .      Patient Active Problem List   Diagnosis    Benign essential hypertension    Benign prostatic hypertrophy, 07/07/2008--negative biopsy.    Carotid artery plaque, 4/26/2021--mild bilateral.  09/10/2018--mild bilateral. 08/11/2016--mild bilateral carotid artery plaque.    Coronary artery disease involving native coronary artery of native heart without angina pectoris    Diverticulosis of colon    Gastroesophageal reflux disease without esophagitis    Generalized osteoarthritis of multiple sites    Hyperlipidemia    History of myocardial infarction, 10/08/2010--patient presented with an anterior myocardial infarction.    Primary osteoarthritis of right knee    History of PTCA, 10/08/2010--anterior MI.  EF 40%.  PTCA with drug-eluting stent proximal LAD.  10% LM.  100% diagonal 1.  30% diffuse circumflex.  30% diffuse RCA.  2011--EF 64%    Vitamin D deficiency    Elevated PSA    Therapeutic drug monitoring    Splenomegaly    Multiple actinic keratoses    Asymptomatic cholelithiasis    Microscopic hematuria    Mild cognitive impairment with memory loss    Thrombocytopenia    History of 2019 novel coronavirus disease (COVID-19)    History of colon polyps, 5/31/2022--tubular adenoma x2.     Advance Care Planning   Advance Care Planning     Advance Directive is not on file.  ACP discussion was held with the patient during this visit. Patient has an advance directive (not in EMR), copy requested.     Objective    Vitals:    04/04/24 0959   BP: 122/64   Pulse: 57   Resp: 16   Temp: 97.4 °F (36.3 °C)   TempSrc: Temporal   SpO2: 92%   Weight: 71.2 kg (157 lb)   Height: 175.3 cm (69.02\")     Estimated body mass index is 23.17 kg/m² as calculated from the following:    Height as of this encounter: 175.3 cm (69.02\").    Weight as of this encounter: 71.2 kg (157 lb).    BMI is within normal parameters. No other follow-up for BMI required.      Does the patient have evidence " of cognitive impairment?   Yes: Continue current medications.            HEALTH RISK ASSESSMENT    Smoking Status:  Social History     Tobacco Use   Smoking Status Former    Current packs/day: 0.00    Average packs/day: 1 pack/day for 15.0 years (15.0 ttl pk-yrs)    Types: Cigarettes    Start date: 1960    Quit date: 1975    Years since quittin.2   Smokeless Tobacco Never   Tobacco Comments    Useage up and down during period.     Alcohol Consumption:  Social History     Substance and Sexual Activity   Alcohol Use Yes    Alcohol/week: 1.0 standard drink of alcohol    Types: 1 Glasses of wine per week    Comment: Socially     Fall Risk Screen:    INDIRA Fall Risk Assessment was completed, and patient is at LOW risk for falls.Assessment completed on:2024    Depression Screenin/4/2024    10:02 AM   PHQ-2/PHQ-9 Depression Screening   Little Interest or Pleasure in Doing Things 0-->not at all   Feeling Down, Depressed or Hopeless 0-->not at all   PHQ-9: Brief Depression Severity Measure Score 0       Health Habits and Functional and Cognitive Screenin/4/2024    10:02 AM   Functional & Cognitive Status   Do you have difficulty preparing food and eating? No   Do you have difficulty bathing yourself, getting dressed or grooming yourself? No   Do you have difficulty using the toilet? No   Do you have difficulty moving around from place to place? No   Do you have trouble with steps or getting out of a bed or a chair? No   Current Diet Well Balanced Diet   Dental Exam Up to date   Eye Exam Up to date   Exercise (times per week) 0 times per week   Current Exercises Include No Regular Exercise   Do you need help using the phone?  No   Are you deaf or do you have serious difficulty hearing?  No   Do you need help to go to places out of walking distance? No   Do you need help shopping? No   Do you need help preparing meals?  No   Do you need help with housework?  No   Do you need help with  laundry? No   Do you need help taking your medications? No   Do you need help managing money? No   Do you ever drive or ride in a car without wearing a seat belt? No   Have you felt unusual stress, anger or loneliness in the last month? No   Who do you live with? Spouse   If you need help, do you have trouble finding someone available to you? No   Have you been bothered in the last four weeks by sexual problems? No   Do you have difficulty concentrating, remembering or making decisions? Yes       Age-appropriate Screening Schedule:  Refer to the list below for future screening recommendations based on patient's age, sex and/or medical conditions. Orders for these recommended tests are listed in the plan section. The patient has been provided with a written plan.    Health Maintenance   Topic Date Due    ANNUAL WELLNESS VISIT  03/27/2024    LIPID PANEL  03/27/2024    RSV Vaccine - Adults (1 - 1-dose 60+ series) 04/04/2025 (Originally 5/5/2000)    TDAP/TD VACCINES (2 - Td or Tdap) 02/13/2027    COLORECTAL CANCER SCREENING  05/31/2027    Pneumococcal Vaccine 65+  Completed    COVID-19 Vaccine  Discontinued    INFLUENZA VACCINE  Discontinued    ZOSTER VACCINE  Discontinued                  CMS Preventative Services Quick Reference  Risk Factors Identified During Encounter:    Immunizations Discussed/Encouraged: RSV (Respiratory Syncytial Virus)    The above risks/problems have been discussed with the patient.  Pertinent information has been shared with the patient in the After Visit Summary.    Diagnoses and all orders for this visit:    1. Encounter for subsequent annual wellness visit (AWV) in Medicare patient (Primary)    2. Hyperlipidemia    3. Benign essential hypertension    4. Benign prostatic hypertrophy, 07/07/2008--negative biopsy.    5. Elevated PSA    6. History of 2019 novel coronavirus disease (COVID-19)    7. Vitamin D deficiency    8. Thrombocytopenia    9. Splenomegaly    10. Microscopic  hematuria    11. Coronary artery disease involving native coronary artery of native heart without angina pectoris    12. History of myocardial infarction, 10/08/2010--patient presented with an anterior myocardial infarction.    13. History of PTCA, 10/08/2010--anterior MI.  EF 40%.  PTCA with drug-eluting stent proximal LAD.  10% LM.  100% diagonal 1.  30% diffuse circumflex.  30% diffuse RCA.  2011--EF 64%    14. Carotid artery plaque, 4/26/2021--mild bilateral.  09/10/2018--mild bilateral. 08/11/2016--mild bilateral carotid artery plaque.    15. Gastroesophageal reflux disease without esophagitis    16. Generalized osteoarthritis of multiple sites    17. History of colon polyps, 5/31/2022--tubular adenoma x2.    18. Mild cognitive impairment with memory loss    19. Multiple actinic keratoses    20. Therapeutic drug monitoring        Follow Up:   Next Medicare Wellness visit to be scheduled in 1 year.      An After Visit Summary and PPPS were made available to the patient.

## 2024-04-11 ENCOUNTER — OFFICE VISIT (OUTPATIENT)
Dept: INTERNAL MEDICINE | Facility: CLINIC | Age: 84
End: 2024-04-11
Payer: MEDICARE

## 2024-04-11 VITALS
RESPIRATION RATE: 12 BRPM | DIASTOLIC BLOOD PRESSURE: 68 MMHG | BODY MASS INDEX: 23.08 KG/M2 | SYSTOLIC BLOOD PRESSURE: 112 MMHG | OXYGEN SATURATION: 99 % | HEIGHT: 69 IN | WEIGHT: 155.8 LBS | HEART RATE: 63 BPM

## 2024-04-11 DIAGNOSIS — N40.1 BENIGN NON-NODULAR PROSTATIC HYPERPLASIA WITH LOWER URINARY TRACT SYMPTOMS: Chronic | ICD-10-CM

## 2024-04-11 DIAGNOSIS — R31.29 MICROSCOPIC HEMATURIA: Chronic | ICD-10-CM

## 2024-04-11 DIAGNOSIS — K21.9 GASTROESOPHAGEAL REFLUX DISEASE WITHOUT ESOPHAGITIS: Chronic | ICD-10-CM

## 2024-04-11 DIAGNOSIS — Z51.81 THERAPEUTIC DRUG MONITORING: ICD-10-CM

## 2024-04-11 DIAGNOSIS — I25.2 HISTORY OF MYOCARDIAL INFARCTION: Chronic | ICD-10-CM

## 2024-04-11 DIAGNOSIS — M15.9 GENERALIZED OSTEOARTHRITIS OF MULTIPLE SITES: Chronic | ICD-10-CM

## 2024-04-11 DIAGNOSIS — R97.20 ELEVATED PSA: Chronic | ICD-10-CM

## 2024-04-11 DIAGNOSIS — I10 BENIGN ESSENTIAL HYPERTENSION: Chronic | ICD-10-CM

## 2024-04-11 DIAGNOSIS — Z86.010 HISTORY OF COLON POLYPS: Chronic | ICD-10-CM

## 2024-04-11 DIAGNOSIS — Z86.16 HISTORY OF 2019 NOVEL CORONAVIRUS DISEASE (COVID-19): Chronic | ICD-10-CM

## 2024-04-11 DIAGNOSIS — I25.10 CORONARY ARTERY DISEASE INVOLVING NATIVE CORONARY ARTERY OF NATIVE HEART WITHOUT ANGINA PECTORIS: Chronic | ICD-10-CM

## 2024-04-11 DIAGNOSIS — I65.23 ATHEROSCLEROSIS OF BOTH CAROTID ARTERIES: Chronic | ICD-10-CM

## 2024-04-11 DIAGNOSIS — E78.2 MIXED HYPERLIPIDEMIA: Primary | Chronic | ICD-10-CM

## 2024-04-11 DIAGNOSIS — R16.1 SPLENOMEGALY: Chronic | ICD-10-CM

## 2024-04-11 DIAGNOSIS — D69.6 THROMBOCYTOPENIA: Chronic | ICD-10-CM

## 2024-04-11 DIAGNOSIS — E55.9 VITAMIN D DEFICIENCY: Chronic | ICD-10-CM

## 2024-04-11 DIAGNOSIS — Z98.61 HISTORY OF PTCA: Chronic | ICD-10-CM

## 2024-04-11 DIAGNOSIS — G31.84 MILD COGNITIVE IMPAIRMENT WITH MEMORY LOSS: Chronic | ICD-10-CM

## 2024-04-11 NOTE — PROGRESS NOTES
04/11/2024    Patient Information  Sergey Mendoza                                                                                          [address] [facility] [address]      1940  [unfilled]  There is no work phone number on file.    Chief Complaint:     Follow-up chronic medical problems and recent blood work.  No new acute complaints.    History of Present Illness:    Patient with a history of medical problems as outlined below in assessment and plan presents today for follow-up with lab prior in order to monitor his chronic medical issues.  His past medical history reviewed and updated were necessary including health maintenance parameters.  This reveals he is up-to-date or accounted for at today's visit.    Review of Systems   Constitutional: Negative.   HENT: Negative.     Eyes: Negative.    Cardiovascular: Negative.    Respiratory: Negative.     Endocrine: Negative.    Hematologic/Lymphatic: Negative.    Skin: Negative.    Musculoskeletal:  Positive for arthritis.   Gastrointestinal: Negative.    Genitourinary: Negative.    Neurological: Negative.    Psychiatric/Behavioral:  Positive for memory loss.    Allergic/Immunologic: Negative.        Active Problems:    Patient Active Problem List   Diagnosis    Benign essential hypertension    Benign prostatic hypertrophy, 07/07/2008--negative biopsy.    Carotid artery plaque, 4/26/2021--mild bilateral.  09/10/2018--mild bilateral. 08/11/2016--mild bilateral carotid artery plaque.    Coronary artery disease involving native coronary artery of native heart without angina pectoris    Diverticulosis of colon    Gastroesophageal reflux disease without esophagitis    Generalized osteoarthritis of multiple sites    Hyperlipidemia    History of myocardial infarction, 10/08/2010--patient presented with an anterior myocardial infarction.    Primary osteoarthritis of right knee    History of PTCA, 10/08/2010--anterior MI.  EF 40%.  PTCA with  drug-eluting stent proximal LAD.  10% LM.  100% diagonal 1.  30% diffuse circumflex.  30% diffuse RCA.  2011--EF 64%    Vitamin D deficiency    Elevated PSA    Therapeutic drug monitoring    Splenomegaly    Multiple actinic keratoses    Asymptomatic cholelithiasis    Microscopic hematuria    Mild cognitive impairment with memory loss    Thrombocytopenia    History of 2019 novel coronavirus disease (COVID-19)    History of colon polyps, 5/31/2022--tubular adenoma x2.         Past Medical History:   Diagnosis Date    Asymptomatic cholelithiasis 02/19/2018    Benign essential hypertension 03/24/2006    Diagnosed approximately March 2006.    Benign prostatic hypertrophy, 07/07/2008--negative biopsy. 07/07/2008    Patient had a history of elevated PSA and found to have benign prostatic hypertrophy.   07/07/2008--negative prostate biopsy.    Carotid artery plaque, 4/26/2021--mild bilateral.  09/10/2018--mild bilateral. 08/11/2016--mild bilateral carotid artery plaque. 04/21/2009 April 26, 2021--carotid Doppler study reveals mild bilateral carotid plaque.  September 10, 2018--carotid Doppler study reveals mild bilateral carotid artery plaque.  08/11/2016--carotid Doppler study reveals mild bilateral carotid plaque.  05/28/2014--vascular screen reveals mild bilateral carotid plaque, negative for AAA, negative for PAD.   03/30/2011--vascular screen revealed mild bilateral ca    Diverticulosis of colon 01/09/2012 01/09/2012--normal colonoscopy except for diffuse diverticulosis.   2004--colonoscopy normal.    Elevated PSA 11/06/2013 02/13/2017--patient seen in follow-up and reports he is followed on a yearly basis by the urologist.  Last follow-up was in December 2016 and his PSA was less than 5.  07/28/2016--PSA elevated at 4.88.  03/19/2014--he was seen in followup and his PSA was back down to 3.9. Patient was not having any urinary symptoms.  12/11/2013--patient was evaluated by the urologist and he elected to just  observe the PSA.   11/06/2013--patient seen in followup in his PSA returned even higher at 5.7. He was referred back to urology.   11/06/2013--PSA returned elevated at 4.3. Patient did have some urinary symptoms consisting of periods of weak stream and dribbling. He had never been treated for chronic prostatitis. I treated him with ciprofloxacin 500 mg for 30 days.        Gastroesophageal reflux disease without esophagitis 03/24/2016    Generalized osteoarthritis of multiple sites 03/24/2016    History of 2019 novel coronavirus disease (COVID-19) 11/30/2021 November 24, 2021    History of cardiac catheterization 10/08/2010    10/08/2010--patient presented with an anterior myocardial infarction. Heart catheterization revealed anterior/apical hypokinesis with 40% ejection fraction. Severe 80% ostial LAD stenosis. Mild 10% left main. Mild diffuse LAD. Totally occluded diagonal one. 30% diffuse circumflex. Mild 30% percent diffuse RCA. Angioplasty with drug eluding stent performed to the proximal LAD.     History of Chronic Duodenitis 01/09/2012 01/09/2012--EGD revealed mild distal erythema of the stomach and proximal duodenum.    History of closed Colles' fracture of left radius 05/05/1953    13 years of age.    History of colon polyps, 5/31/2022--tubular adenoma x2. 05/31/2022    May 31, 2022--colonoscopy revealed tubular adenoma x2.  Proximal and ascending colon.    History of iron deficiency anemia 06/23/2012 02/07/2017--hemoglobin normal at 14.2, hematocrit normal at 45.1.  Serum iron normal at 105.  Iron saturation normal at 30%.  Resolve this issue.  08/02/2016--patient seen in follow-up.  White count is low at 3.66.  Hemoglobin low at 13.3.  Hematocrit normal at 41.8.  Platelets low at 109.  Homocystine and methylmalonic acid are normal.  Serum iron normal at 98.  Iron saturation normal at 28.  Patient is taking iron sulfate 325 mg daily.  10/13/2013--CBC was entirely normal, anemia resolved we'll  continue to monitor.  06/23/2012--patient treated for iron deficiency anemia caused by duodenitis which was revealed per EGD 01/09/2012.     History of myocardial infarction, 10/08/2010--patient presented with an anterior myocardial infarction. 10/08/2010    Stress test 02/23/2011--mild anterior wall ischemia, ejection fraction improved to 64%.  10/08/2010--patient presented with an anterior myocardial infarction. Heart catheterization revealed anterior/apical hypokinesis with 40% ejection fraction. Severe 80% ostial LAD stenosis. Mild 10% left main. Mild diffuse LAD. Totally occluded diagonal one. 30% diffuse circumflex. Mild 30% percent diffuse RCA. Angioplasty with drug eluding stent performed to the proximal LAD.     History of PTCA, 10/08/2010--anterior MI.  EF 40%.  PTCA with drug-eluting stent proximal LAD.  10% LM.  100% diagonal 1.  30% diffuse circumflex.  30% diffuse RCA.  2011--EF 64% 10/08/2010    Stress test 02/23/2011--mild anterior wall ischemia, ejection fraction improved to 64%.  10/08/2010--patient presented with an anterior myocardial infarction. Heart catheterization revealed anterior/apical hypokinesis with 40% ejection fraction. Severe 80% ostial LAD stenosis. Mild 10% left main. Mild diffuse LAD. Totally occluded diagonal one. 30% diffuse circumflex. Mild 30% percent diffuse RCA. Angioplasty with drug eluding stent performed to the proximal LAD.     History of Tear of medial meniscus of right knee 10/17/2014    12/09/2014--patient was evaluated by the orthopedist. He felt that patient also had end stage osteoarthritis the right knee and will eventually need a total knee replacement. In the meantime he gave a cortisone injection in the knee and patient reports this did help. Patient also went to physical therapy which seemed to help somewhat also.   10/29/2014--MRI of the right knee reveals tricompartmental osteoarthritis that is severe in the medial compartment. There is a degenerative tear  of the medial meniscus. There is a joint effusion with Baker's cyst. Intramuscular extension of the Baker's cyst into the medial head of the gastrocnemius muscle. Patient referred to orthopedics.   10/17/2014--x-ray of the right knee reveals a small suprapatellar effusion. Narrowing of the medial compartment. There is patellar spur formation. No fracture, dislocation, bone lesion is demonstrated.   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 He was trying to sit down and was in an awkward position.     Hyperlipidemia 03/06/2011 03/16/2011--treatment for hyperlipidemia and initiated.    Internal hemorrhoids 03/24/2016    Microscopic hematuria 02/19/2018 12/20/2017--patient was evaluated by the urologist for a routine follow-up elevated PSA.  He was noted to have microscopic hematuria with 3-5 RBCs.  Cystoscopy was reportedly negative.  Patient also had a CT scan of the abdomen and pelvis which revealed asymptomatic cholelithiasis, diffuse atherosclerosis, and enlarged prostate.    Mild cognitive impairment with memory loss 03/16/2020 January 13, 2023--patient seen in follow-up and I reviewed the neuropsychological testing.  He would like to go on medication to try to improve or slow up loss of memory and I think this is certainly reasonable.  Plan is as follows: Trial of Aricept with slow titration.  And in the future we will plan on adding Namenda once he is on Aricept 10 mg/day.  Patient has a follow-up appointment in March     Multiple actinic keratoses 08/02/2016 08/02/2016--patient presents with multiple actinic keratoses as well as seborrheic keratoses.  Dermatology referral given.    Occlusive coronary artery disease, 10/08/2010--anterior MI.  EF 40%.  PTCA with drug-eluting stent proximal LAD.  10% LM.  100% diagonal 1.  30% diffuse circumflex.  30% diffuse RCA. 10/08/2010    02/23/2011--stress Cardiolite reveals mild anterior wall ischemia, ejection fraction improved to  64%.  10/08/2010--patient presented with an anterior myocardial infarction. Heart catheterization revealed anterior/apical hypokinesis with 40% ejection fraction. Severe 80% ostial LAD stenosis. Mild 10% left main. Mild diffuse LAD. Totally occluded diagonal one. 30% diffuse circumflex. Mild 30% percent diffuse RCA. Angioplasty with drug eluding stent performed to the proximal LAD.   10/08/2010--anterior MI.  EF 40%.  PTCA with drug-eluting stent proximal LAD.  10% LM.  100% diagonal 1.  30% diffuse circumflex.  30% diffuse RCA.    Primary osteoarthritis of right knee 10/17/2014    12/09/2014--patient was evaluated by the orthopedist. He felt that patient also had end stage osteoarthritis the right knee and will eventually need a total knee replacement. In the meantime he gave a cortisone injection in the knee and patient reports this did help. Patient also went to physical therapy which seemed to help somewhat also.  10/29/2014--MRI of the right knee reveals tricompartmental osteoarthritis that is severe in the medial compartment. There is a degenerative tear of the medial meniscus. There is a joint effusion with Baker's cyst. Intramuscular extension of the Baker's cyst into the medial head of the gastrocnemius muscle. Patient referred to orthopedics.   10/17/2014--x-ray of the right knee reveals a small suprapatellar effusion. Narrowing of the medial compartment. There is patellar spur formation. No fracture, dislocation, bone lesion is demonstrated.   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 He was trying to sit down and was in an awkward position. T    Splenomegaly 08/02/2016 09/06/2013--CT scan reveals mild splenomegaly.  Although not in the report, the radiologist did compare the CT scan from 09/06/2013 to the one from 2010.  The spleen actually looks smaller in size, 13.5 cm compared to 15 cm previously.  In another direction, spleen is 20 cm as compared to 24 cm.  The last  dimension was the maximum length.  08/04/2010--initial evaluation by the hematologist. WBCs 3.5. Differential normal. Absolute neutrophil count 2500. Hemoglobin normal at 14.2. MCV low at 83. Platelet count low at 125. IPF normal at 4.6%. Review of old labs through 1997 revealed a white blood cell count has fluctuated between 2.8 and 3.9. There is no trend downward but it is fluctuating. Platelet count has ranged between 148 and 198. MCV has been low. Liver spleen scan revealed mild splenomegaly. B12, folic acid, iron studies, rheumatoid factor, SPEP have all been normal. Felt to be mild pancytopenia secondary to splenomegaly. Patient is followed on a regular basis by the hematologist.    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 03/24/2021    Vitamin D deficiency 03/24/2016         Past Surgical History:   Procedure Laterality Date    CARDIAC CATHETERIZATION  10/08/2010    See past medical history    CAROTID STENT  2006?    COLONOSCOPY  01/09/2012 01/09/2012--normal colonoscopy except for diffuse diverticulosis.     COLONOSCOPY  2004 2004--colonoscopy normal.    COLONOSCOPY N/A 05/31/2022    Procedure: COLONOSCOPY to cecum into TI with hot snare polypectomies;  Surgeon: Raj Lopez MD;  Location: [facility];  Service: General;  Laterality: N/A;  pre: positive cologuard  post: polyps, diverticulosis, heomorrhoids    CORONARY ANGIOPLASTY WITH STENT PLACEMENT  10/10/2010    10/10/2010--critical 80% proximal LAD stenosis treated using a drug-coated stent. Unable to cross diagonal one.    ESOPHAGOSCOPY / EGD  01/09/2012 01/09/2012--EGD performed for iron deficiency anemia revealed mild erythema of the distal stomach and proximal duodenum consistent with duodenitis.    PROSTATE BIOPSY  07/07/2008 07/07/2008--negative prostate biopsy.  Patient had a history of elevated PSA and found to have benign prostatic hypertrophy.          Allergies   Allergen Reactions    Penicillins      Sulfa Antibiotics            Current Outpatient Medications:     aspirin 81 MG tablet, Take 1 tablet by mouth Daily., Disp: , Rfl:     Coenzyme Q10 (COQ10) 400 MG capsule, Take 1 capsule by mouth daily. Take with a meal., Disp: , Rfl:     memantine (NAMENDA) 5 MG tablet, TAKE 1 TABLET EVERY DAY, Disp: 90 tablet, Rfl: 3    metoprolol succinate XL (TOPROL-XL) 50 MG 24 hr tablet, TAKE 1 TABLET EVERY DAY FOR BLOOD PRESSURE AND HEART, Disp: 90 tablet, Rfl: 3    rivastigmine (EXELON) 1.5 MG capsule, Take 1 capsule by mouth 2 (Two) Times a Day., Disp: 60 capsule, Rfl: 5    rosuvastatin (CRESTOR) 20 MG tablet, TAKE 1 TABLET EVERY DAY FOR HIGH CHOLESTEROL, Disp: 90 tablet, Rfl: 3    vitamin D3 125 MCG (5000 UT) capsule capsule, 1 by mouth daily as directed, Disp: 30 capsule, Rfl:       Family History   Problem Relation Age of Onset    Coronary artery disease Mother     Stroke Mother     No Known Problems Father     Cancer Maternal Grandmother     Heart attack Maternal Grandfather     Heart disease Maternal Grandfather     No Known Problems Paternal Grandmother     No Known Problems Paternal Grandfather          Social History     Socioeconomic History    Marital status:    Tobacco Use    Smoking status: Former     Current packs/day: 0.00     Average packs/day: 1 pack/day for 15.0 years (15.0 ttl pk-yrs)     Types: Cigarettes     Start date: 1960     Quit date: 1975     Years since quittin.3    Smokeless tobacco: Never    Tobacco comments:     Useage up and down during period.   Vaping Use    Vaping status: Never Used   Substance and Sexual Activity    Alcohol use: Yes     Alcohol/week: 1.0 standard drink of alcohol     Types: 1 Glasses of wine per week     Comment: Socially    Drug use: No    Sexual activity: Not Currently     Partners: Female     Birth control/protection: Condom         Vitals:    24 1325   BP: 112/68   BP Location: Left arm   Patient Position: Sitting   Cuff Size: Adult   Pulse:  "63   Resp: 12   SpO2: 99%   Weight: 70.7 kg (155 lb 12.8 oz)   Height: 175.3 cm (69\")        Body mass index is 23.01 kg/m².      Physical Exam:    General: Alert and oriented x 3.  No acute distress.  Normal affect.  HEENT: Pupils equal, round, reactive to light; extraocular movements intact; sclerae nonicteric; pharynx, ear canals and TMs normal.  Neck: Without JVD, thyromegaly, bruit, or adenopathy.  Lungs: Clear to auscultation in all fields.  Heart: Regular rate and rhythm without murmur, rub, gallop, or click.  Abdomen: Soft, nontender, without hepatosplenomegaly or hernia.  Bowel sounds normal.  : Deferred.  Rectal: Deferred.  Extremities: Without clubbing, cyanosis, edema, or pulse deficit.  Neurologic: Intact without focal deficit.  Normal station and gait observed during ingress and egress from the examination room.  Skin: Without significant lesion.  Musculoskeletal: Unremarkable.    Lab/other results:    CBC normal except white count a little low at 3.3 and platelets low at 113.  CPK normal.  CMP normal.  Total cholesterol 108, triglycerides 149, LDL particle #514, HDL particle number little low at 29.8.  Thyroid function tests are normal.  PSA elevated at 6.1.  Urinalysis normal.  Vitamin D normal at 61.6.  SARS antibodies are positive.    Assessment/Plan:     Diagnosis Plan   1. Hyperlipidemia  CK    Comprehensive Metabolic Panel    NMR LipoProfile    TSH    T4, Free    T3, Free      2. Thrombocytopenia  CBC (No Diff)      3. Splenomegaly        4. Vitamin D deficiency  Vitamin D,25-Hydroxy      5. History of 2019 novel coronavirus disease (COVID-19)  SARS-CoV-2 Antibodies, Nucleocapsid (Natural Immunity)      6. Elevated PSA  PSA DIAGNOSTIC      7. Benign prostatic hypertrophy, 07/07/2008--negative biopsy.  PSA DIAGNOSTIC      8. Benign essential hypertension  Comprehensive Metabolic Panel      9. Coronary artery disease involving native coronary artery of native heart without angina pectoris      "   10. History of myocardial infarction, 10/08/2010--patient presented with an anterior myocardial infarction.        11. History of PTCA, 10/08/2010--anterior MI.  EF 40%.  PTCA with drug-eluting stent proximal LAD.  10% LM.  100% diagonal 1.  30% diffuse circumflex.  30% diffuse RCA.  2011--EF 64%        12. Microscopic hematuria        13. Gastroesophageal reflux disease without esophagitis        14. Generalized osteoarthritis of multiple sites        15. Carotid artery plaque, 4/26/2021--mild bilateral.  09/10/2018--mild bilateral. 08/11/2016--mild bilateral carotid artery plaque.        16. History of colon polyps, 5/31/2022--tubular adenoma x2.        17. Mild cognitive impairment with memory loss        18. Therapeutic drug monitoring  Urinalysis With Microscopic If Indicated (No Culture) - Urine, Clean Catch        Patient has hyperlipidemia which is under excellent control with Crestor 20 mg/day.  Thrombocytopenia seems to be stable and related to splenomegaly.  Vitamin D in normal range with supplementation.  Patient has a history of COVID-19 and still has COVID antibodies.  He has an elevated PSA and BPH and has been biopsied in the past by the urologist.  His blood pressure appears to be well-controlled with metoprolol which is also helpful for his coronary artery disease which seems stable.  Esophageal reflux currently not requiring medication.  Patient has a history of colon polyps and is up-to-date on his colonoscopy.  He has a history of carotid artery plaque and just had a carotid Doppler study performed in February by the cardiologist.  Patient has problems with memory loss and mild cognitive impairment followed by the neurologist and was placed on which he seems to be tolerating well.    Plan is as follows: No changes in current medical regimen.  Patient will follow-up after April 4, 2025 for his subsequent Medicare wellness visit.  Otherwise he will follow-up as needed.        Procedures

## 2024-07-30 DIAGNOSIS — G31.84 MILD COGNITIVE IMPAIRMENT WITH MEMORY LOSS: Chronic | ICD-10-CM

## 2024-07-30 RX ORDER — RIVASTIGMINE TARTRATE 1.5 MG/1
1.5 CAPSULE ORAL 2 TIMES DAILY
Qty: 60 CAPSULE | Refills: 5 | Status: SHIPPED | OUTPATIENT
Start: 2024-07-30

## 2024-09-04 ENCOUNTER — PATIENT ROUNDING (BHMG ONLY) (OUTPATIENT)
Dept: URGENT CARE | Facility: CLINIC | Age: 84
End: 2024-09-04
Payer: MEDICARE

## 2024-09-04 NOTE — ED NOTES
Thank you for letting us care for you at your recent visit to [facility]. We would love to hear about your experience with us. Was this the first time you have visited our location?    We’re always looking for ways to make our patients’ experience even better. Do you have any recommendations on ways we may improve?     Please be on the lookout for a survey about your recent visit from Idalia Bowie via text or email. We would greatly appreciate if you could fill that out and turn it back in. We want your voice to be heard and we value your feedback.     Thank you for choosing [facility] for your healthcare needs. I appreciate you taking the time to respond!

## 2024-09-06 ENCOUNTER — OFFICE VISIT (OUTPATIENT)
Dept: CARDIOLOGY | Facility: CLINIC | Age: 84
End: 2024-09-06
Payer: MEDICARE

## 2024-09-06 VITALS
DIASTOLIC BLOOD PRESSURE: 70 MMHG | SYSTOLIC BLOOD PRESSURE: 138 MMHG | BODY MASS INDEX: 22.51 KG/M2 | OXYGEN SATURATION: 98 % | WEIGHT: 152 LBS | HEART RATE: 53 BPM | HEIGHT: 69 IN

## 2024-09-06 DIAGNOSIS — I10 BENIGN ESSENTIAL HYPERTENSION: Chronic | ICD-10-CM

## 2024-09-06 DIAGNOSIS — D69.6 THROMBOCYTOPENIA: Chronic | ICD-10-CM

## 2024-09-06 DIAGNOSIS — E78.00 HYPERCHOLESTEROLEMIA: ICD-10-CM

## 2024-09-06 DIAGNOSIS — E78.2 MIXED HYPERLIPIDEMIA: Chronic | ICD-10-CM

## 2024-09-06 DIAGNOSIS — I25.10 CORONARY ARTERY DISEASE INVOLVING NATIVE CORONARY ARTERY OF NATIVE HEART WITHOUT ANGINA PECTORIS: Primary | Chronic | ICD-10-CM

## 2024-09-06 DIAGNOSIS — I25.10 CORONARY ARTERY DISEASE INVOLVING NATIVE CORONARY ARTERY OF NATIVE HEART WITHOUT ANGINA PECTORIS: Chronic | ICD-10-CM

## 2024-09-06 PROCEDURE — 99214 OFFICE O/P EST MOD 30 MIN: CPT | Performed by: INTERNAL MEDICINE

## 2024-09-06 PROCEDURE — 3078F DIAST BP <80 MM HG: CPT | Performed by: INTERNAL MEDICINE

## 2024-09-06 PROCEDURE — 3075F SYST BP GE 130 - 139MM HG: CPT | Performed by: INTERNAL MEDICINE

## 2024-09-06 NOTE — PROGRESS NOTES
PATIENTINFORMATION    Date of Office Visit: 2024  Encounter Provider: Natalio Morales MD  Place of Service: [facility] CARDIOLOGY  Patient Name: Sergey Mendoza  : 1940    Subjective:     Encounter Date:2024      Patient ID: Sergey Mendoza is a 84 y.o. male.    Chief Complaint   Patient presents with    Shortness of Breath       HPI  Mr. Mendoza is a pleasant 84  years old gentleman who came to cardiology clinic for follow-up visit.  He is compliant with current medications and denies any significant side effects .  He had to go to the ER few days ago for evaluation of cough and being treated with steroid and antitussives he denies any rest or exertional chest pain, shortness of breath, orthopnea, PND, palpitations, presyncope or syncope or extremity swelling.   Does not exercise because of severe knee osteoarthritis but denies symptoms with activities.    ROS  All systems reviewed and negative except as noted in HPI.    Past Medical History:   Diagnosis Date    Asymptomatic cholelithiasis 2018    Benign essential hypertension 2006    Diagnosed approximately 2006.    Benign prostatic hypertrophy, 2008--negative biopsy. 2008    Patient had a history of elevated PSA and found to have benign prostatic hypertrophy.   2008--negative prostate biopsy.    Carotid artery plaque, 2021--mild bilateral.  09/10/2018--mild bilateral. 2016--mild bilateral carotid artery plaque. 2009--carotid Doppler study reveals mild bilateral carotid plaque.  September 10, 2018--carotid Doppler study reveals mild bilateral carotid artery plaque.  2016--carotid Doppler study reveals mild bilateral carotid plaque.  2014--vascular screen reveals mild bilateral carotid plaque, negative for AAA, negative for PAD.   2011--vascular screen revealed mild bilateral ca    Diverticulosis of colon 2012--normal  colonoscopy except for diffuse diverticulosis.   2004--colonoscopy normal.    Elevated PSA 11/06/2013 02/13/2017--patient seen in follow-up and reports he is followed on a yearly basis by the urologist.  Last follow-up was in December 2016 and his PSA was less than 5.  07/28/2016--PSA elevated at 4.88.  03/19/2014--he was seen in followup and his PSA was back down to 3.9. Patient was not having any urinary symptoms.  12/11/2013--patient was evaluated by the urologist and he elected to just observe the PSA.   11/06/2013--patient seen in followup in his PSA returned even higher at 5.7. He was referred back to urology.   11/06/2013--PSA returned elevated at 4.3. Patient did have some urinary symptoms consisting of periods of weak stream and dribbling. He had never been treated for chronic prostatitis. I treated him with ciprofloxacin 500 mg for 30 days.        Gastroesophageal reflux disease without esophagitis 03/24/2016    Generalized osteoarthritis of multiple sites 03/24/2016    History of 2019 novel coronavirus disease (COVID-19) 11/30/2021 November 24, 2021    History of cardiac catheterization 10/08/2010    10/08/2010--patient presented with an anterior myocardial infarction. Heart catheterization revealed anterior/apical hypokinesis with 40% ejection fraction. Severe 80% ostial LAD stenosis. Mild 10% left main. Mild diffuse LAD. Totally occluded diagonal one. 30% diffuse circumflex. Mild 30% percent diffuse RCA. Angioplasty with drug eluding stent performed to the proximal LAD.     History of Chronic Duodenitis 01/09/2012 01/09/2012--EGD revealed mild distal erythema of the stomach and proximal duodenum.    History of closed Colles' fracture of left radius 05/05/1953    13 years of age.    History of colon polyps, 5/31/2022--tubular adenoma x2. 05/31/2022    May 31, 2022--colonoscopy revealed tubular adenoma x2.  Proximal and ascending colon.    History of iron deficiency anemia 06/23/2012     02/07/2017--hemoglobin normal at 14.2, hematocrit normal at 45.1.  Serum iron normal at 105.  Iron saturation normal at 30%.  Resolve this issue.  08/02/2016--patient seen in follow-up.  White count is low at 3.66.  Hemoglobin low at 13.3.  Hematocrit normal at 41.8.  Platelets low at 109.  Homocystine and methylmalonic acid are normal.  Serum iron normal at 98.  Iron saturation normal at 28.  Patient is taking iron sulfate 325 mg daily.  10/13/2013--CBC was entirely normal, anemia resolved we'll continue to monitor.  06/23/2012--patient treated for iron deficiency anemia caused by duodenitis which was revealed per EGD 01/09/2012.     History of myocardial infarction, 10/08/2010--patient presented with an anterior myocardial infarction. 10/08/2010    Stress test 02/23/2011--mild anterior wall ischemia, ejection fraction improved to 64%.  10/08/2010--patient presented with an anterior myocardial infarction. Heart catheterization revealed anterior/apical hypokinesis with 40% ejection fraction. Severe 80% ostial LAD stenosis. Mild 10% left main. Mild diffuse LAD. Totally occluded diagonal one. 30% diffuse circumflex. Mild 30% percent diffuse RCA. Angioplasty with drug eluding stent performed to the proximal LAD.     History of PTCA, 10/08/2010--anterior MI.  EF 40%.  PTCA with drug-eluting stent proximal LAD.  10% LM.  100% diagonal 1.  30% diffuse circumflex.  30% diffuse RCA.  2011--EF 64% 10/08/2010    Stress test 02/23/2011--mild anterior wall ischemia, ejection fraction improved to 64%.  10/08/2010--patient presented with an anterior myocardial infarction. Heart catheterization revealed anterior/apical hypokinesis with 40% ejection fraction. Severe 80% ostial LAD stenosis. Mild 10% left main. Mild diffuse LAD. Totally occluded diagonal one. 30% diffuse circumflex. Mild 30% percent diffuse RCA. Angioplasty with drug eluding stent performed to the proximal LAD.     History of Tear of medial meniscus of right knee  10/17/2014    12/09/2014--patient was evaluated by the orthopedist. He felt that patient also had end stage osteoarthritis the right knee and will eventually need a total knee replacement. In the meantime he gave a cortisone injection in the knee and patient reports this did help. Patient also went to physical therapy which seemed to help somewhat also.   10/29/2014--MRI of the right knee reveals tricompartmental osteoarthritis that is severe in the medial compartment. There is a degenerative tear of the medial meniscus. There is a joint effusion with Baker's cyst. Intramuscular extension of the Baker's cyst into the medial head of the gastrocnemius muscle. Patient referred to orthopedics.   10/17/2014--x-ray of the right knee reveals a small suprapatellar effusion. Narrowing of the medial compartment. There is patellar spur formation. No fracture, dislocation, bone lesion is demonstrated.   10/17/2014--patient was on tour bus in [place] this past October 2013. He was trying to sit down and was in an awkward position.     Hyperlipidemia 03/06/2011 03/16/2011--treatment for hyperlipidemia and initiated.    Internal hemorrhoids 03/24/2016    Microscopic hematuria 02/19/2018 12/20/2017--patient was evaluated by the urologist for a routine follow-up elevated PSA.  He was noted to have microscopic hematuria with 3-5 RBCs.  Cystoscopy was reportedly negative.  Patient also had a CT scan of the abdomen and pelvis which revealed asymptomatic cholelithiasis, diffuse atherosclerosis, and enlarged prostate.    Mild cognitive impairment with memory loss 03/16/2020 January 13, 2023--patient seen in follow-up and I reviewed the neuropsychological testing.  He would like to go on medication to try to improve or slow up loss of memory and I think this is certainly reasonable.  Plan is as follows: Trial of Aricept with slow titration.  And in the future we will plan on adding Namenda once he is on Aricept 10 mg/day.  Patient  has a follow-up appointment in March     Multiple actinic keratoses 08/02/2016 08/02/2016--patient presents with multiple actinic keratoses as well as seborrheic keratoses.  Dermatology referral given.    Occlusive coronary artery disease, 10/08/2010--anterior MI.  EF 40%.  PTCA with drug-eluting stent proximal LAD.  10% LM.  100% diagonal 1.  30% diffuse circumflex.  30% diffuse RCA. 10/08/2010    02/23/2011--stress Cardiolite reveals mild anterior wall ischemia, ejection fraction improved to 64%.  10/08/2010--patient presented with an anterior myocardial infarction. Heart catheterization revealed anterior/apical hypokinesis with 40% ejection fraction. Severe 80% ostial LAD stenosis. Mild 10% left main. Mild diffuse LAD. Totally occluded diagonal one. 30% diffuse circumflex. Mild 30% percent diffuse RCA. Angioplasty with drug eluding stent performed to the proximal LAD.   10/08/2010--anterior MI.  EF 40%.  PTCA with drug-eluting stent proximal LAD.  10% LM.  100% diagonal 1.  30% diffuse circumflex.  30% diffuse RCA.    Primary osteoarthritis of right knee 10/17/2014    12/09/2014--patient was evaluated by the orthopedist. He felt that patient also had end stage osteoarthritis the right knee and will eventually need a total knee replacement. In the meantime he gave a cortisone injection in the knee and patient reports this did help. Patient also went to physical therapy which seemed to help somewhat also.  10/29/2014--MRI of the right knee reveals tricompartmental osteoarthritis that is severe in the medial compartment. There is a degenerative tear of the medial meniscus. There is a joint effusion with Baker's cyst. Intramuscular extension of the Baker's cyst into the medial head of the gastrocnemius muscle. Patient referred to orthopedics.   10/17/2014--x-ray of the right knee reveals a small suprapatellar effusion. Narrowing of the medial compartment. There is patellar spur formation. No fracture, dislocation,  bone lesion is demonstrated.   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 He was trying to sit down and was in an awkward position. T    Splenomegaly 08/02/2016 09/06/2013--CT scan reveals mild splenomegaly.  Although not in the report, the radiologist did compare the CT scan from 09/06/2013 to the one from 2010.  The spleen actually looks smaller in size, 13.5 cm compared to 15 cm previously.  In another direction, spleen is 20 cm as compared to 24 cm.  The last dimension was the maximum length.  08/04/2010--initial evaluation by the hematologist. WBCs 3.5. Differential normal. Absolute neutrophil count 2500. Hemoglobin normal at 14.2. MCV low at 83. Platelet count low at 125. IPF normal at 4.6%. Review of old labs through 1997 revealed a white blood cell count has fluctuated between 2.8 and 3.9. There is no trend downward but it is fluctuating. Platelet count has ranged between 148 and 198. MCV has been low. Liver spleen scan revealed mild splenomegaly. B12, folic acid, iron studies, rheumatoid factor, SPEP have all been normal. Felt to be mild pancytopenia secondary to splenomegaly. Patient is followed on a regular basis by the hematologist.    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 03/24/2021    Vitamin D deficiency 03/24/2016       Past Surgical History:   Procedure Laterality Date    CARDIAC CATHETERIZATION  10/08/2010    See past medical history    CAROTID STENT  2006?    COLONOSCOPY  01/09/2012 01/09/2012--normal colonoscopy except for diffuse diverticulosis.     COLONOSCOPY  2004 2004--colonoscopy normal.    COLONOSCOPY N/A 05/31/2022    Procedure: COLONOSCOPY to cecum into TI with hot snare polypectomies;  Surgeon: Raj Lopez MD;  Location: [facility] ENDOSCOPY;  Service: General;  Laterality: N/A;  pre: positive cologuard  post: polyps, diverticulosis, heomorrhoids    CORONARY ANGIOPLASTY WITH STENT PLACEMENT  10/10/2010    10/10/2010--critical  "80% proximal LAD stenosis treated using a drug-coated stent. Unable to cross diagonal one.    ESOPHAGOSCOPY / EGD  2012--EGD performed for iron deficiency anemia revealed mild erythema of the distal stomach and proximal duodenum consistent with duodenitis.    PROSTATE BIOPSY  2008--negative prostate biopsy.  Patient had a history of elevated PSA and found to have benign prostatic hypertrophy.        Social History     Socioeconomic History    Marital status:    Tobacco Use    Smoking status: Former     Current packs/day: 0.00     Average packs/day: 1 pack/day for 15.0 years (15.0 ttl pk-yrs)     Types: Cigarettes     Start date: 1960     Quit date: 1975     Years since quittin.7    Smokeless tobacco: Never    Tobacco comments:     Useage up and down during period.   Vaping Use    Vaping status: Never Used   Substance and Sexual Activity    Alcohol use: Yes     Alcohol/week: 1.0 standard drink of alcohol     Types: 1 Glasses of wine per week     Comment: Socially    Drug use: No    Sexual activity: Not Currently     Partners: Female     Birth control/protection: Condom       Family History   Problem Relation Age of Onset    Coronary artery disease Mother     Stroke Mother     No Known Problems Father     Cancer Maternal Grandmother     Heart attack Maternal Grandfather     Heart disease Maternal Grandfather     No Known Problems Paternal Grandmother     No Known Problems Paternal Grandfather          Procedures       Objective:     /70   Pulse 53   Ht 175.3 cm (69\")   Wt 68.9 kg (152 lb)   SpO2 98%   BMI 22.45 kg/m²  Body mass index is 22.45 kg/m².     Constitutional:       General: Not in acute distress.     Appearance: Well-developed. Not diaphoretic.   Eyes:      Pupils: Pupils are equal, round, and reactive to light.   HENT:      Head: Normocephalic and atraumatic.   Neck:      Thyroid: No thyromegaly.   Pulmonary:      Effort: Pulmonary " effort is normal. No respiratory distress.      Breath sounds: Normal breath sounds. No wheezing. No rales.   Chest:      Chest wall: Not tender to palpatation.   Cardiovascular:      Normal rate. Regular rhythm.      No gallop.    Pulses:     Intact distal pulses.   Edema:     Peripheral edema absent.   Abdominal:      General: Bowel sounds are normal. There is no distension.      Palpations: Abdomen is soft.      Tenderness: There is no guarding.   Musculoskeletal: Normal range of motion.         General: No deformity.      Cervical back: Normal range of motion and neck supple. Skin:     General: Skin is warm and dry.      Findings: No rash.   Neurological:      Mental Status: Alert and oriented to person, place, and time.      Cranial Nerves: No cranial nerve deficit.      Deep Tendon Reflexes: Reflexes are normal and symmetric.   Psychiatric:         Judgment: Judgment normal.         Review Of Data: I have reviewed pertinent recent labs, images and documents and pertinent findings included in HPI or assessment below.    Lipid Panel          4/4/2024    10:32   Lipid Panel   Total Cholesterol 108    Triglycerides 129          Assessment  Coronary artery disease with anterior MI status post GLORIA in the LAD back in 2010.  Also noted to have 30% circumflex and 6% RCA lesion at that time  Hypercholesterolemia -repeat panel at goal on rosuvastatin  Hypertension-controlled  Mild bilateral carotid artery disease-unchanged on carotid Doppler done in 2024.  Chronic bilateral knee pain from degenerative disease interfering with activity  Mild cognitive impairment  Chronic mild thrombocytopenia-stable    Mr. Mendoza is doing fairly well from cardiac standpoint.  Vital signs within range.  I have encouraged him to do recumbent bike that he has at home.  Otherwise continue current care.  Follow-up in cardiology clinic in 1 year or sooner with any concerning symptoms.       Diagnosis and plan of care discussed with patient and  verbalized understanding.            Your medication list            Accurate as of September 6, 2024  9:00 AM. If you have any questions, ask your nurse or doctor.                CONTINUE taking these medications        Instructions Last Dose Given Next Dose Due   aspirin 81 MG tablet      Take 1 tablet by mouth Daily.       benzonatate 100 MG capsule  Commonly known as: TESSALON      Take 1 capsule by mouth 3 (Three) Times a Day As Needed for Cough.       CoQ10 400 MG capsule      Take 1 capsule by mouth daily. Take with a meal.       memantine 5 MG tablet  Commonly known as: NAMENDA      TAKE 1 TABLET EVERY DAY       methylPREDNISolone 4 MG dose pack  Commonly known as: MEDROL      Take as directed on package instructions.       metoprolol succinate XL 50 MG 24 hr tablet  Commonly known as: TOPROL-XL      TAKE 1 TABLET EVERY DAY FOR BLOOD PRESSURE AND HEART       rivastigmine 1.5 MG capsule  Commonly known as: EXELON      Take 1 capsule by mouth 2 (Two) Times a Day.       rosuvastatin 20 MG tablet  Commonly known as: CRESTOR      TAKE 1 TABLET EVERY DAY FOR HIGH CHOLESTEROL       vitamin D3 125 MCG (5000 UT) capsule capsule      1 by mouth daily as directed                    Natalio Morales MD  09/06/24  09:00 EDT

## 2024-09-09 RX ORDER — METOPROLOL SUCCINATE 50 MG/1
TABLET, EXTENDED RELEASE ORAL
Qty: 90 TABLET | Refills: 3 | Status: SHIPPED | OUTPATIENT
Start: 2024-09-09

## 2024-09-09 RX ORDER — ROSUVASTATIN CALCIUM 20 MG/1
TABLET, COATED ORAL
Qty: 90 TABLET | Refills: 3 | Status: SHIPPED | OUTPATIENT
Start: 2024-09-09

## 2024-10-08 ENCOUNTER — OFFICE VISIT (OUTPATIENT)
Dept: NEUROLOGY | Facility: CLINIC | Age: 84
End: 2024-10-08
Payer: MEDICARE

## 2024-10-08 VITALS
RESPIRATION RATE: 20 BRPM | BODY MASS INDEX: 22.81 KG/M2 | DIASTOLIC BLOOD PRESSURE: 72 MMHG | HEART RATE: 57 BPM | WEIGHT: 154 LBS | SYSTOLIC BLOOD PRESSURE: 120 MMHG | OXYGEN SATURATION: 99 % | HEIGHT: 69 IN

## 2024-10-08 DIAGNOSIS — R45.4 IRRITABILITY: ICD-10-CM

## 2024-10-08 DIAGNOSIS — R51.9 PERSISTENT HEADACHES: ICD-10-CM

## 2024-10-08 DIAGNOSIS — G31.84 MILD COGNITIVE IMPAIRMENT WITH MEMORY LOSS: Primary | Chronic | ICD-10-CM

## 2024-10-08 NOTE — PROGRESS NOTES
"DOS: 10/8/2024  NAME: Sergey Mendoza   : 1940  PCP: Ramesh Blake MD    Chief Complaint   Patient presents with    Mild cognitive impairment with memory loss      SUBJECTIVE  Neurological Problem:  84 y.o. RHW male with a past medical history of hypertension, hyperlipidemia, BPH, elevated PSA, osteoarthritis, GERD, CAD, MI (2010, stent x1), MCI, anxiety. They are seen in follow up today for MCI. A summary of the history was taken from the previous note with additions/modifications as indicated. He is accompanied by his spouse, Jes.     Interval History:   **For detailed interval history see progress note dated 10/2/2023.    Mr. Mendoza has been followed by Dr. Ontiveros and now myself for mild cognitive impairment. He was seen by [facility] for neuropsychological evaluation in 2022 which showed mild cognitive impairment, etiology somewhat speculative and partially at least result from small vessel ischemia. An unspecified anxiety disorder was also diagnosed. He attended cognitive rehabilitation and therapy in 2023. He was tried and unable to tolerate donepezil. He has also been tried on memantine, increased to 10 mg daily, unable to tolerate 10 mg twice daily due to lethargy. At last visit he reported a persistent dull ache behind his eyes that radiated to his posterior head. A repeat MRI brain was ordered in 2023, not completed.     He presents today in follow-up, denies any significant change to his memory. His spouse is with him and says she notices he will repeat himself at times later in the day to ask about an appointment or something she told him earlier. He remains independent with ADLs, continues to drive and has not gotten lost of had any accidents/tickets. He denies any falls, no gait change, no development of a tremor. He denies any hallucinations. He is tolerating the rivastigmine 1.5 mg BID well. He voices frustration with being unable to recall \"things I know " "that I know\". His spouse says he does get irritable and easily upset but it hasn't been \"a problem\". He continues to experience the dull pressure behind his eyes, worse in the morning. He denies sleep apnea, spouse says he does not snore. He has not been tested for it of note. He denies any focal or unilateral symptoms such as visual or speech deficit, facial droop, weakness/numbness to one side of the body.    Review of Systems   Musculoskeletal:  Negative for gait problem.   Neurological:  Negative for dizziness, tremors, seizures, syncope, facial asymmetry, speech difficulty, weakness, light-headedness, numbness and headaches.   Psychiatric/Behavioral:  Positive for agitation. Negative for behavioral problems, confusion, decreased concentration, dysphoric mood, hallucinations, self-injury, sleep disturbance and suicidal ideas. The patient is not nervous/anxious and is not hyperactive.         The following portions of the patient's history were reviewed and updated as appropriate: allergies, current medications, past family history, past medical history, past social history, past surgical history and problem list.    Current Medications:   Current Outpatient Medications:     aspirin 81 MG tablet, Take 1 tablet by mouth Daily., Disp: , Rfl:     Coenzyme Q10 (COQ10) 400 MG capsule, Take 1 capsule by mouth daily. Take with a meal., Disp: , Rfl:     memantine (NAMENDA) 5 MG tablet, TAKE 1 TABLET EVERY DAY, Disp: 90 tablet, Rfl: 3    metoprolol succinate XL (TOPROL-XL) 50 MG 24 hr tablet, TAKE 1 TABLET EVERY DAY FOR BLOOD PRESSURE AND HEART, Disp: 90 tablet, Rfl: 3    rivastigmine (EXELON) 1.5 MG capsule, Take 1 capsule by mouth 2 (Two) Times a Day., Disp: 60 capsule, Rfl: 5    rosuvastatin (CRESTOR) 20 MG tablet, TAKE 1 TABLET EVERY DAY FOR HIGH CHOLESTEROL, Disp: 90 tablet, Rfl: 3    vitamin D3 125 MCG (5000 UT) capsule capsule, 1 by mouth daily as directed, Disp: 30 capsule, Rfl:   **I did not stop or change the " "above medications.  Patient's medication list was updated to reflect medications they have reported as currently taking, including medication changes made by other providers.    Objective   Vital Signs:  /72   Pulse 57   Resp 20   Ht 175.3 cm (69.02\")   Wt 69.9 kg (154 lb)   SpO2 99%   BMI 22.73 kg/m²   Body mass index is 22.73 kg/m².    Physical Exam   Physical Exam:  GENERAL: NAD, alert  HEENT: Normocephalic, atraumatic   Resp: Even and unlabored  Extremities: No edema  Skin: Warm and dry  Psychiatric: Normal mood and affect    Neurological:   MS: AO to self/person, place, time (year 2024/day of week Wednesday then Tuesday/date 9th then 8th, season \"late fall\"), recent/remote memory impaired, impaired attention/concentration, language intact, no neglect. Recall 1/3 (1 with hint),   CN: visual acuity grossly normal, PERRL, EOMI, no nystagmus, no facial droop, no dysarthria  Motor: Normal tone and bulk. No tremor or abnormal movements noted.   Trapezius elevation: 5/5  Shoulder abductors 5/5  Elbow flexors 5/5  Elbow extensors 5/5   Left  2+  Right  2+  Hip flexors 5/5  Knee extensors 5/5  Hamstring strength 5/5  Dorsiflexors 5/5  Plantar flexors 5/5  Sensory: Intact to crude touch in all four ext.  Gait and station: Normal gait and station    Result Review :  The following data was reviewed by: JASS Feldman on 10/08/2024:  Laboratory Results:         Lab Results   Component Value Date    WBC 3.3 (L) 04/04/2024    HGB 13.1 04/04/2024    HCT 39.7 04/04/2024    MCV 84 04/04/2024     (L) 04/04/2024     Lab Results   Component Value Date    GLUCOSE 93 04/04/2024    BUN 17 04/04/2024    CREATININE 1.06 04/04/2024    EGFRIFNONA 73 11/26/2021    EGFRIFAFRI 76 03/09/2020    BCR 16 04/04/2024    K 4.5 04/04/2024    CO2 28 04/04/2024    CALCIUM 9.3 04/04/2024    PROTENTOTREF 6.4 04/04/2024    ALBUMIN 4.3 04/04/2024    LABIL2 2.0 04/04/2024    AST 17 04/04/2024    ALT 14 04/04/2024     No " "results found for: \"HGBA1C\"  No results found for: \"CHOL\"  Lab Results   Component Value Date    HDL 34 (L) 04/27/2015     Lab Results   Component Value Date    LDL 46 07/26/2016    LDL 28 04/27/2015     Lab Results   Component Value Date    TRIG 129 04/04/2024    TRIG 98 03/27/2023    TRIG 117 03/18/2022     No results found for: \"RPR\"  Lab Results   Component Value Date    TSH 2.860 04/04/2024     No results found for: \"[id number]\"               Assessment and Plan   Diagnoses and all orders for this visit:    1. Mild cognitive impairment with memory loss (Primary)    2. Persistent headaches  -     MRI Brain With & Without Contrast; Future    3. Irritability      I would like for Sergey to have the MRI brain w/wo as he is still reporting the dull ache behind his eyes. We discussed using Tylenol as needed, staying hydrated, getting plenty of rest. I encouraged him to schedule an appointment with his eye doctor as well. For his irritability we talked that it could be related to his anxiety and that he should speak with his PCP about it. I don't feel it's due to his MCI medications. We will continue rivastigmine 1.5 mg BID and memantine 5 mg daily for cognitive impairment. Recommend control of vascular risk factors and cognitive lifestyle modifications including the addition of daily mental stimulation.     We will see Sergey back in 6 months, sooner if symptoms warrant.     JASS Felmdan  [facility]   10/08/24       I spent 30 minutes caring for Sergey on this date of service. This time includes time spent by me in the following activities:preparing for the visit, obtaining and/or reviewing a separately obtained history, performing a medically appropriate examination and/or evaluation , counseling and educating the patient/family/caregiver, ordering medications, tests, or procedures, documenting information in the medical record, independently interpreting results and communicating that information with the " patient/family/caregiver, and care coordination  Follow Up   Return in about 6 months (around 4/8/2025).  Patient was given instructions and counseling regarding his condition or for health maintenance advice. Please see specific information pulled into the AVS if appropriate.       Dictated using Dragon Dictation

## 2024-10-08 NOTE — PATIENT INSTRUCTIONS
Recommended Lifestyle Modifications:   -Regular physical activity (ie Silver Sneakers programs)  -Regular social activity (ie clubs, Yazidism groups, etc)  -Regular mental stimulation (ie puzzles, cross-words, crafts, etc)  -Healthy diet (ie Mediterranean or DASH diet)     BP goal less than 140/90, hydrate and avoid hypotension  HgA1c goal less than 5.7%  LDL goal less than 100    Recommend regular physical activity (150 minutes of moderate-intensity weekly) and a low-salt and/or Mediterranean diet.

## 2025-01-17 ENCOUNTER — APPOINTMENT (OUTPATIENT)
Dept: CT IMAGING | Facility: HOSPITAL | Age: 85
End: 2025-01-17
Payer: MEDICARE

## 2025-01-17 ENCOUNTER — HOSPITAL ENCOUNTER (EMERGENCY)
Facility: HOSPITAL | Age: 85
Discharge: HOME | End: 2025-01-17
Attending: EMERGENCY MEDICINE
Payer: MEDICARE

## 2025-01-17 VITALS
TEMPERATURE: 98.4 F | HEART RATE: 63 BPM | OXYGEN SATURATION: 93 % | DIASTOLIC BLOOD PRESSURE: 72 MMHG | SYSTOLIC BLOOD PRESSURE: 141 MMHG | RESPIRATION RATE: 16 BRPM

## 2025-01-17 DIAGNOSIS — S09.90XA ACUTE HEAD INJURY WITHOUT LOSS OF CONSCIOUSNESS, INITIAL ENCOUNTER: Primary | ICD-10-CM

## 2025-01-17 DIAGNOSIS — S00.01XA SCALP ABRASION, INITIAL ENCOUNTER: ICD-10-CM

## 2025-01-17 PROCEDURE — 72125 CT NECK SPINE W/O DYE: CPT

## 2025-01-17 PROCEDURE — 99284 EMERGENCY DEPT VISIT MOD MDM: CPT

## 2025-01-17 PROCEDURE — 70450 CT HEAD/BRAIN W/O DYE: CPT

## 2025-01-17 NOTE — ED PROVIDER NOTES
MD ATTESTATION NOTE    The MADELIN and I have discussed this patient's history, physical exam, and treatment plan.  I have reviewed the documentation and personally had a face to face interaction with the patient. I affirm the documentation and agree with the treatment and plan.  The attached note describes my personal findings.      I provided a substantive portion of the care of the patient.  I personally performed the physical exam in its entirety, and below are my findings.        Brief HPI: This patient is an 84-year-old male presenting to the emergency room today after a fall that occurred yesterday evening after slipping on ice.  He states he was on his front porch when the slip happened and he fell backwards striking his head on the ground.  He does complain of some very mild head discomfort but denies consciousness, neck pain, numbness, weakness, dizziness, or blurry vision.      PHYSICAL EXAM  ED Triage Vitals   Temp Heart Rate Resp BP SpO2   01/17/25 1203 01/17/25 1203 01/17/25 1203 01/17/25 1214 01/17/25 1203   98.4 °F (36.9 °C) 63 16 141/72 93 %      Temp src Heart Rate Source Patient Position BP Location FiO2 (%)   -- -- -- -- --                GENERAL: Resting comfortably and in no acute distress, nontoxic in appearance  HENT: nares patent, scalp abrasion present  EYES: no scleral icterus  CV: regular rhythm, normal rate, no M/R/G  RESPIRATORY: normal effort, lungs clear bilaterally  ABDOMEN: soft, nontender, no rebound or guarding  MUSCULOSKELETAL: no deformity, no edema  NEURO: alert, moves all extremities, follows commands  PSYCH:  calm, cooperative  SKIN: warm, dry    Vital signs and nursing notes reviewed.      Differential diagnosis includes but is not limited to skull fracture, intracranial hemorrhage, intracranial mass effect, or cervical spine trauma.      Plan: We will obtain CT scans of the patient's head and cervical spine and reassess following results.      Head CT independently interpreted  by myself with my interpretation showing no skull fracture nor area of hemorrhage or mass effect.       Frank Hylton MD  01/17/25 [id number]

## 2025-01-17 NOTE — ED PROVIDER NOTES
EMERGENCY DEPARTMENT ENCOUNTER      PCP: Ramesh Blake MD  Patient Care Team:  Ramesh Blake MD as PCP - General (Internal Medicine)   Independent Historians: Patient, spouse at bedside    HPI:  Chief Complaint: Head injury   A complete HPI/ROS/PMH/PSH/SH/FH are unobtainable due to: None    Chronic or social conditions impacting patient care (social determinants of health): None    Context: Sergey Mendoza is a 84 y.o. male who presents to the ED c/o acute head injury that occurred last night after slipping on ice. He denies LOC. Pt takes 81 mg ASA daily. Denies neck pain, numbness, weakness, nausea, vomiting, vision changes.    Review of prior external notes and/or external test results outside of this encounter: CMP on 4/4/24 was unremarkable. CBC shows mild leukopenia with WBC of 3.3 which is unchanged from previous.      PAST MEDICAL HISTORY  Active Ambulatory Problems     Diagnosis Date Noted    Benign essential hypertension 03/24/2006    Benign prostatic hypertrophy, 07/07/2008--negative biopsy. 07/07/2008    Carotid artery plaque, 4/26/2021--mild bilateral.  09/10/2018--mild bilateral. 08/11/2016--mild bilateral carotid artery plaque. 04/21/2009    Coronary artery disease involving native coronary artery of native heart without angina pectoris 10/08/2010    Diverticulosis of colon 01/09/2012    Gastroesophageal reflux disease without esophagitis 03/24/2016    Generalized osteoarthritis of multiple sites 03/24/2016    Hyperlipidemia 03/06/2011    History of myocardial infarction, 10/08/2010--patient presented with an anterior myocardial infarction. 10/08/2010    Primary osteoarthritis of right knee 10/17/2014    History of PTCA, 10/08/2010--anterior MI.  EF 40%.  PTCA with drug-eluting stent proximal LAD.  10% LM.  100% diagonal 1.  30% diffuse circumflex.  30% diffuse RCA.  2011--EF 64% 10/08/2010    Vitamin D deficiency 03/24/2016    Elevated PSA 11/06/2013    Therapeutic drug monitoring 07/27/2016     Splenomegaly 08/02/2016    Multiple actinic keratoses 08/02/2016    Asymptomatic cholelithiasis 02/19/2018    Microscopic hematuria 02/19/2018    Mild cognitive impairment with memory loss 03/16/2020    Thrombocytopenia 03/24/2021    History of 2019 novel coronavirus disease (COVID-19) 11/30/2021    History of colon polyps, 5/31/2022--tubular adenoma x2. 05/31/2022    Hypercholesterolemia 09/06/2024     Resolved Ambulatory Problems     Diagnosis Date Noted    Flu-like symptoms 03/24/2016    History of Tear of medial meniscus of right knee 10/17/2014    Pancytopenia 08/04/2010    History of bone density study 03/24/2016    History of cardiovascular stress test 03/24/2016    History of Chronic Duodenitis 01/09/2012    History of closed Colles' fracture of left radius 03/24/2016    History of Holter monitoring 03/24/2016    History of Doppler ultrasound, carotid artery 03/24/2016    History of carotid Doppler/vascular screen 08/11/2016    Internal hemorrhoids 03/24/2016    History of Iron deficiency anemia due to chronic blood loss 03/24/2016    History of pneumococcal vaccination 03/24/2016    History of cardiac catheterization 10/08/2010    History of iron deficiency anemia 06/23/2012    History of acute sinusitis 04/29/2016    [employer] Medicare replacement physical exam 02/19/2018    Acute bronchitis with bronchospasm 12/12/2018    Persistent cough 01/03/2019    Trigger finger, right middle finger 02/25/2019    Positive colorectal cancer screening using Cologuard test 04/28/2022    Tinea corporis 01/13/2023     Past Medical History:   Diagnosis Date    HL (hearing loss) 2021    Hyperlipidemia 03/06/2011    Memory loss 2023    Occlusive coronary artery disease, 10/08/2010--anterior MI.  EF 40%.  PTCA with drug-eluting stent proximal LAD.  10% LM.  100% diagonal 1.  30% diffuse circumflex.  30% diffuse RCA. 10/08/2010           PAST SURGICAL HISTORY  Past Surgical History:   Procedure Laterality Date    CARDIAC  CATHETERIZATION  10/08/2010    See past medical history    CAROTID STENT  2006?    COLONOSCOPY  2012--normal colonoscopy except for diffuse diverticulosis.     COLONOSCOPY  2004--colonoscopy normal.    COLONOSCOPY N/A 2022    Procedure: COLONOSCOPY to cecum into TI with hot snare polypectomies;  Surgeon: Raj Lopez MD;  Location: [facility] ENDOSCOPY;  Service: General;  Laterality: N/A;  pre: positive cologuard  post: polyps, diverticulosis, heomorrhoids    CORONARY ANGIOPLASTY WITH STENT PLACEMENT  10/10/2010    10/10/2010--critical 80% proximal LAD stenosis treated using a drug-coated stent. Unable to cross diagonal one.    ESOPHAGOSCOPY / EGD  2012--EGD performed for iron deficiency anemia revealed mild erythema of the distal stomach and proximal duodenum consistent with duodenitis.    PROSTATE BIOPSY  2008--negative prostate biopsy.  Patient had a history of elevated PSA and found to have benign prostatic hypertrophy.          FAMILY HISTORY  Family History   Problem Relation Age of Onset    Coronary artery disease Mother     Stroke Mother     No Known Problems Father     Cancer Maternal Grandmother     Heart attack Maternal Grandfather     Heart disease Maternal Grandfather     No Known Problems Paternal Grandmother     No Known Problems Paternal Grandfather          SOCIAL HISTORY  Social History     Socioeconomic History    Marital status:    Tobacco Use    Smoking status: Former     Current packs/day: 0.00     Average packs/day: 1 pack/day for 15.0 years (15.0 ttl pk-yrs)     Types: Cigarettes     Start date: 1960     Quit date: 1975     Years since quittin.0     Passive exposure: Past    Smokeless tobacco: Never    Tobacco comments:     Useage up and down during period.   Vaping Use    Vaping status: Never Used   Substance and Sexual Activity    Alcohol use: Yes     Alcohol/week: 1.0 standard drink of  alcohol     Types: 1 Glasses of wine per week     Comment: Socially    Drug use: No    Sexual activity: Not Currently     Partners: Female     Birth control/protection: Condom         ALLERGIES  Penicillins and Sulfa antibiotics        REVIEW OF SYSTEMS  Review of Systems   Eyes:  Negative for visual disturbance.   Gastrointestinal:  Negative for nausea and vomiting.   Musculoskeletal:  Negative for neck pain.   Skin:  Positive for wound (abrasion top of scalp).   Neurological:  Negative for headaches.        All systems reviewed and negative except for those discussed in HPI.       PHYSICAL EXAM    I have reviewed the triage vital signs and nursing notes.    ED Triage Vitals   Temp Heart Rate Resp BP SpO2   01/17/25 1203 01/17/25 1203 01/17/25 1203 01/17/25 1214 01/17/25 1203   98.4 °F (36.9 °C) 63 16 141/72 93 %      Temp src Heart Rate Source Patient Position BP Location FiO2 (%)   -- -- -- -- --              Physical Exam  GENERAL: alert, no acute distress  SKIN: Warm, dry  HENT: Normocephalic, abrasion to top of scalp  EYES: no scleral icterus  CV: regular rhythm, regular rate  RESPIRATORY: normal effort, lungs clear  ABDOMEN: soft, nontender, nondistended  MUSCULOSKELETAL: no deformity  NEURO: alert, moves all extremities, follows commands          LAB RESULTS  No results found for this or any previous visit (from the past 24 hours).    Ordered the above labs and independently reviewed and interpreted the results.        RADIOLOGY  CT Head Without Contrast, CT Cervical Spine Without Contrast    Result Date: 1/17/2025  CT HEAD WO CONTRAST-, CT CERVICAL SPINE WO CONTRAST-  HISTORY:  fall  COMPARISON: None  CT HEAD WITHOUT CONTRAST: The brain ventricles are symmetrical. There is no evidence of hemorrhage, hydrocephalus or acute infarction. Mild to moderate vascular calcification is noted. Bone windows show no evidence of a calvarial fracture.  CT EXAMINATION OF CERVICAL SPINE WITHOUT CONTRAST: There is partial  fusion of the C6 and C7 vertebral bodies. There is severe loss of disc height at C3-4 and grade 1 retrolisthesis of C3 upon C4. Moderate loss of disc height is noted at C5-6. Moderate to severe foraminal stenosis is present to the left at C3-4 and C5-6 and there is mild to moderate foraminal stenosis to the right at C7-T1. There is no evidence of prevertebral edema or a fracture.      Radiation dose reduction techniques were utilized, including automated exposure modulation based on body size.  This report was finalized on 1/17/2025 1:37 PM by Dr. Larry Rowan M.D on Workstation: [id number]       I ordered the above noted radiological studies. Independently reviewed and interpreted by me.  See dictation for official radiology interpretation.      PROCEDURES    Procedures      MEDICATIONS GIVEN IN ER    Medications - No data to display      PROGRESS, DATA ANALYSIS, CONSULTS, AND MEDICAL DECISION MAKING    All labs have been independently reviewed and interpreted by me.  All radiology studies have been independently reviewed and interpreted by me and discussed with radiologist dictating the report.   EKG's independently reviewed and interpreted by me.  Discussion below represents my analysis of pertinent findings related to patient's condition, differential diagnosis, treatment plan and final disposition.    Differential diagnosis: concussion, abrasion, laceration, intracranial hemorrhage, fracture    ED Course as of 01/17/25 1355   Fri Jan 17, 2025   1355 CT Head Without Contrast  Radiology study independently interpreted by me and my findings are no large intracranial hemorrhage  .   [DC]      ED Course User Index  [DC] Charlene Wagner PA             AS OF 13:55 EST VITALS:    BP - 141/72  HR - 63  TEMP - 98.4 °F (36.9 °C)  O2 SATS - 93%        DIAGNOSIS  Final diagnoses:   Acute head injury without loss of consciousness, initial encounter   Scalp abrasion, initial encounter         DISPOSITION  ED Disposition        ED Disposition   Discharge    Condition   Stable    Comment   --                  Note Disclaimer: At [facility], we believe that sharing information builds trust and better relationships. You are receiving this note because you recently visited [facility]. It is possible you will see health information before a provider has talked with you about it. This kind of information can be easy to misunderstand. To help you fully understand what it means for your health, we urge you to discuss this note with your provider.         Charlene Wagner PA  01/17/25 [id number]

## 2025-01-17 NOTE — ED NOTES
Patient to ER via car from home for slip and fall on ice last night hitting head  and R calf   No loc is on Asprin

## 2025-04-01 DIAGNOSIS — G31.84 MILD COGNITIVE IMPAIRMENT WITH MEMORY LOSS: Chronic | ICD-10-CM

## 2025-04-01 RX ORDER — RIVASTIGMINE TARTRATE 1.5 MG/1
1.5 CAPSULE ORAL 2 TIMES DAILY
Qty: 180 CAPSULE | Refills: 0 | Status: SHIPPED | OUTPATIENT
Start: 2025-04-01

## 2025-04-02 ENCOUNTER — DOCUMENTATION (OUTPATIENT)
Dept: NEUROLOGY | Facility: CLINIC | Age: 85
End: 2025-04-02
Payer: MEDICARE

## 2025-04-02 NOTE — PROGRESS NOTES
Records requested from [place] [facility]. Spoke with patient's wife about scheduling MRI; phone number given. Elly MARTE will work on PA.

## 2025-04-16 ENCOUNTER — OFFICE VISIT (OUTPATIENT)
Dept: INTERNAL MEDICINE | Facility: CLINIC | Age: 85
End: 2025-04-16
Payer: MEDICARE

## 2025-04-16 VITALS
DIASTOLIC BLOOD PRESSURE: 66 MMHG | OXYGEN SATURATION: 96 % | WEIGHT: 146 LBS | HEIGHT: 69 IN | HEART RATE: 54 BPM | BODY MASS INDEX: 21.62 KG/M2 | SYSTOLIC BLOOD PRESSURE: 138 MMHG | TEMPERATURE: 97.6 F | RESPIRATION RATE: 16 BRPM

## 2025-04-16 DIAGNOSIS — D69.6 THROMBOCYTOPENIA: Chronic | ICD-10-CM

## 2025-04-16 DIAGNOSIS — N40.1 BENIGN NON-NODULAR PROSTATIC HYPERPLASIA WITH LOWER URINARY TRACT SYMPTOMS: Chronic | ICD-10-CM

## 2025-04-16 DIAGNOSIS — K80.20 ASYMPTOMATIC CHOLELITHIASIS: Chronic | ICD-10-CM

## 2025-04-16 DIAGNOSIS — L57.0 MULTIPLE ACTINIC KERATOSES: Chronic | ICD-10-CM

## 2025-04-16 DIAGNOSIS — I10 BENIGN ESSENTIAL HYPERTENSION: Chronic | ICD-10-CM

## 2025-04-16 DIAGNOSIS — R16.1 SPLENOMEGALY: Chronic | ICD-10-CM

## 2025-04-16 DIAGNOSIS — G31.84 MILD COGNITIVE IMPAIRMENT WITH MEMORY LOSS: Chronic | ICD-10-CM

## 2025-04-16 DIAGNOSIS — Z98.61 HISTORY OF PTCA: Chronic | ICD-10-CM

## 2025-04-16 DIAGNOSIS — I25.2 HISTORY OF MYOCARDIAL INFARCTION: Chronic | ICD-10-CM

## 2025-04-16 DIAGNOSIS — E78.2 MIXED HYPERLIPIDEMIA: Primary | Chronic | ICD-10-CM

## 2025-04-16 DIAGNOSIS — I65.23 ATHEROSCLEROSIS OF BOTH CAROTID ARTERIES: Chronic | ICD-10-CM

## 2025-04-16 DIAGNOSIS — K21.9 GASTROESOPHAGEAL REFLUX DISEASE WITHOUT ESOPHAGITIS: Chronic | ICD-10-CM

## 2025-04-16 DIAGNOSIS — E55.9 VITAMIN D DEFICIENCY: Chronic | ICD-10-CM

## 2025-04-16 DIAGNOSIS — Z86.0100 HISTORY OF COLON POLYPS: Chronic | ICD-10-CM

## 2025-04-16 DIAGNOSIS — Z86.16 HISTORY OF 2019 NOVEL CORONAVIRUS DISEASE (COVID-19): Chronic | ICD-10-CM

## 2025-04-16 DIAGNOSIS — M15.9 GENERALIZED OSTEOARTHRITIS OF MULTIPLE SITES: Chronic | ICD-10-CM

## 2025-04-16 DIAGNOSIS — I25.10 CORONARY ARTERY DISEASE INVOLVING NATIVE CORONARY ARTERY OF NATIVE HEART WITHOUT ANGINA PECTORIS: Chronic | ICD-10-CM

## 2025-04-16 DIAGNOSIS — R31.29 MICROSCOPIC HEMATURIA: Chronic | ICD-10-CM

## 2025-04-16 DIAGNOSIS — Z51.81 THERAPEUTIC DRUG MONITORING: ICD-10-CM

## 2025-04-16 DIAGNOSIS — R97.20 ELEVATED PSA: Chronic | ICD-10-CM

## 2025-04-16 NOTE — PROGRESS NOTES
04/16/2025    Patient Information  Sergey Mendoza                                                                                          [address] [facility] [address]      1940  [unfilled]  There is no work phone number on file.    Chief Complaint:     Follow-up chronic medical problems.  Recent lab work.    History of Present Illness:    Patient with several chronic medical problems as noted below in assessment plan presents to today for a follow-up with lab prior in order to monitor his chronic medical illnesses.  No new acute complaints.  Past medical history reviewed and updated were necessary including health maintenance parameters.  This reveals he needs RSV vaccine which I have recommended he go to the drugstore to obtain it.    Review of Systems   Constitutional: Negative.   HENT: Negative.     Eyes: Negative.    Cardiovascular: Negative.    Respiratory: Negative.     Endocrine: Negative.    Hematologic/Lymphatic: Negative.    Skin: Negative.    Musculoskeletal: Negative.    Gastrointestinal: Negative.    Genitourinary: Negative.    Neurological: Negative.    Psychiatric/Behavioral:  Positive for memory loss.    Allergic/Immunologic: Negative.        Active Problems:    Patient Active Problem List   Diagnosis    Benign essential hypertension    Benign prostatic hypertrophy, 07/07/2008--negative biopsy.    Carotid artery plaque, 4/26/2021--mild bilateral.  09/10/2018--mild bilateral. 08/11/2016--mild bilateral carotid artery plaque.    Coronary artery disease involving native coronary artery of native heart without angina pectoris    Diverticulosis of colon    Gastroesophageal reflux disease without esophagitis    Generalized osteoarthritis of multiple sites    Hyperlipidemia    History of myocardial infarction, 10/08/2010--patient presented with an anterior myocardial infarction.    Primary osteoarthritis of right knee    History of PTCA, 10/08/2010--anterior MI.  EF 40%.   PTCA with drug-eluting stent proximal LAD.  10% LM.  100% diagonal 1.  30% diffuse circumflex.  30% diffuse RCA.  2011--EF 64%    Vitamin D deficiency    Elevated PSA    Therapeutic drug monitoring    Splenomegaly    Multiple actinic keratoses    Asymptomatic cholelithiasis    Microscopic hematuria    Mild cognitive impairment with memory loss    Thrombocytopenia    History of 2019 novel coronavirus disease (COVID-19)    History of colon polyps, 5/31/2022--tubular adenoma x2.         Past Medical History:   Diagnosis Date    Asymptomatic cholelithiasis 02/19/2018    Benign essential hypertension 03/24/2006    Diagnosed approximately March 2006.    Benign prostatic hypertrophy, 07/07/2008--negative biopsy. 07/07/2008    Patient had a history of elevated PSA and found to have benign prostatic hypertrophy.   07/07/2008--negative prostate biopsy.    Carotid artery plaque, 4/26/2021--mild bilateral.  09/10/2018--mild bilateral. 08/11/2016--mild bilateral carotid artery plaque. 04/21/2009 April 26, 2021--carotid Doppler study reveals mild bilateral carotid plaque.  September 10, 2018--carotid Doppler study reveals mild bilateral carotid artery plaque.  08/11/2016--carotid Doppler study reveals mild bilateral carotid plaque.  05/28/2014--vascular screen reveals mild bilateral carotid plaque, negative for AAA, negative for PAD.   03/30/2011--vascular screen revealed mild bilateral ca    Diverticulosis of colon 01/09/2012 01/09/2012--normal colonoscopy except for diffuse diverticulosis.   2004--colonoscopy normal.    Elevated PSA 11/06/2013 02/13/2017--patient seen in follow-up and reports he is followed on a yearly basis by the urologist.  Last follow-up was in December 2016 and his PSA was less than 5.  07/28/2016--PSA elevated at 4.88.  03/19/2014--he was seen in followup and his PSA was back down to 3.9. Patient was not having any urinary symptoms.  12/11/2013--patient was evaluated by the urologist and he  elected to just observe the PSA.   11/06/2013--patient seen in followup in his PSA returned even higher at 5.7. He was referred back to urology.   11/06/2013--PSA returned elevated at 4.3. Patient did have some urinary symptoms consisting of periods of weak stream and dribbling. He had never been treated for chronic prostatitis. I treated him with ciprofloxacin 500 mg for 30 days.        Gastroesophageal reflux disease without esophagitis 03/24/2016    Generalized osteoarthritis of multiple sites 03/24/2016    History of 2019 novel coronavirus disease (COVID-19) 11/30/2021 November 24, 2021    History of cardiac catheterization 10/08/2010    10/08/2010--patient presented with an anterior myocardial infarction. Heart catheterization revealed anterior/apical hypokinesis with 40% ejection fraction. Severe 80% ostial LAD stenosis. Mild 10% left main. Mild diffuse LAD. Totally occluded diagonal one. 30% diffuse circumflex. Mild 30% percent diffuse RCA. Angioplasty with drug eluding stent performed to the proximal LAD.     History of Chronic Duodenitis 01/09/2012 01/09/2012--EGD revealed mild distal erythema of the stomach and proximal duodenum.    History of closed Colles' fracture of left radius 05[birth year]05/1953    13 years of age.    History of colon polyps, 5/31/2022--tubular adenoma x2. 05/31/2022    May 31, 2022--colonoscopy revealed tubular adenoma x2.  Proximal and ascending colon.    History of iron deficiency anemia 06/23/2012 02/07/2017--hemoglobin normal at 14.2, hematocrit normal at 45.1.  Serum iron normal at 105.  Iron saturation normal at 30%.  Resolve this issue.  08/02/2016--patient seen in follow-up.  White count is low at 3.66.  Hemoglobin low at 13.3.  Hematocrit normal at 41.8.  Platelets low at 109.  Homocystine and methylmalonic acid are normal.  Serum iron normal at 98.  Iron saturation normal at 28.  Patient is taking iron sulfate 325 mg daily.  10/13/2013--CBC was entirely normal, anemia  resolved we'll continue to monitor.  06/23/2012--patient treated for iron deficiency anemia caused by duodenitis which was revealed per EGD 01/09/2012.     History of myocardial infarction, 10/08/2010--patient presented with an anterior myocardial infarction. 10/08/2010    Stress test 02/23/2011--mild anterior wall ischemia, ejection fraction improved to 64%.  10/08/2010--patient presented with an anterior myocardial infarction. Heart catheterization revealed anterior/apical hypokinesis with 40% ejection fraction. Severe 80% ostial LAD stenosis. Mild 10% left main. Mild diffuse LAD. Totally occluded diagonal one. 30% diffuse circumflex. Mild 30% percent diffuse RCA. Angioplasty with drug eluding stent performed to the proximal LAD.     History of PTCA, 10/08/2010--anterior MI.  EF 40%.  PTCA with drug-eluting stent proximal LAD.  10% LM.  100% diagonal 1.  30% diffuse circumflex.  30% diffuse RCA.  2011--EF 64% 10/08/2010    Stress test 02/23/2011--mild anterior wall ischemia, ejection fraction improved to 64%.  10/08/2010--patient presented with an anterior myocardial infarction. Heart catheterization revealed anterior/apical hypokinesis with 40% ejection fraction. Severe 80% ostial LAD stenosis. Mild 10% left main. Mild diffuse LAD. Totally occluded diagonal one. 30% diffuse circumflex. Mild 30% percent diffuse RCA. Angioplasty with drug eluding stent performed to the proximal LAD.     History of Tear of medial meniscus of right knee 10/17/2014    12/09/2014--patient was evaluated by the orthopedist. He felt that patient also had end stage osteoarthritis the right knee and will eventually need a total knee replacement. In the meantime he gave a cortisone injection in the knee and patient reports this did help. Patient also went to physical therapy which seemed to help somewhat also.   10/29/2014--MRI of the right knee reveals tricompartmental osteoarthritis that is severe in the medial compartment. There is a  degenerative tear of the medial meniscus. There is a joint effusion with Baker's cyst. Intramuscular extension of the Baker's cyst into the medial head of the gastrocnemius muscle. Patient referred to orthopedics.   10/17/2014--x-ray of the right knee reveals a small suprapatellar effusion. Narrowing of the medial compartment. There is patellar spur formation. No fracture, dislocation, bone lesion is demonstrated.   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 He was trying to sit down and was in an awkward position.     Hyperlipidemia 03/06/2011 03/16/2011--treatment for hyperlipidemia and initiated.    Internal hemorrhoids 03/24/2016    Microscopic hematuria 02/19/2018 12/20/2017--patient was evaluated by the urologist for a routine follow-up elevated PSA.  He was noted to have microscopic hematuria with 3-5 RBCs.  Cystoscopy was reportedly negative.  Patient also had a CT scan of the abdomen and pelvis which revealed asymptomatic cholelithiasis, diffuse atherosclerosis, and enlarged prostate.    Mild cognitive impairment with memory loss 03/16/2020 January 13, 2023--patient seen in follow-up and I reviewed the neuropsychological testing.  He would like to go on medication to try to improve or slow up loss of memory and I think this is certainly reasonable.  Plan is as follows: Trial of Aricept with slow titration.  And in the future we will plan on adding Namenda once he is on Aricept 10 mg/day.  Patient has a follow-up appointment in March     Multiple actinic keratoses 08/02/2016 08/02/2016--patient presents with multiple actinic keratoses as well as seborrheic keratoses.  Dermatology referral given.    Occlusive coronary artery disease, 10/08/2010--anterior MI.  EF 40%.  PTCA with drug-eluting stent proximal LAD.  10% LM.  100% diagonal 1.  30% diffuse circumflex.  30% diffuse RCA. 10/08/2010    02/23/2011--stress Cardiolite reveals mild anterior wall ischemia, ejection  fraction improved to 64%.  10/08/2010--patient presented with an anterior myocardial infarction. Heart catheterization revealed anterior/apical hypokinesis with 40% ejection fraction. Severe 80% ostial LAD stenosis. Mild 10% left main. Mild diffuse LAD. Totally occluded diagonal one. 30% diffuse circumflex. Mild 30% percent diffuse RCA. Angioplasty with drug eluding stent performed to the proximal LAD.   10/08/2010--anterior MI.  EF 40%.  PTCA with drug-eluting stent proximal LAD.  10% LM.  100% diagonal 1.  30% diffuse circumflex.  30% diffuse RCA.    Primary osteoarthritis of right knee 10/17/2014    12/09/2014--patient was evaluated by the orthopedist. He felt that patient also had end stage osteoarthritis the right knee and will eventually need a total knee replacement. In the meantime he gave a cortisone injection in the knee and patient reports this did help. Patient also went to physical therapy which seemed to help somewhat also.  10/29/2014--MRI of the right knee reveals tricompartmental osteoarthritis that is severe in the medial compartment. There is a degenerative tear of the medial meniscus. There is a joint effusion with Baker's cyst. Intramuscular extension of the Baker's cyst into the medial head of the gastrocnemius muscle. Patient referred to orthopedics.   10/17/2014--x-ray of the right knee reveals a small suprapatellar effusion. Narrowing of the medial compartment. There is patellar spur formation. No fracture, dislocation, bone lesion is demonstrated.   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 He was trying to sit down and was in an awkward position. T    Splenomegaly 08/02/2016 09/06/2013--CT scan reveals mild splenomegaly.  Although not in the report, the radiologist did compare the CT scan from 09/06/2013 to the one from 2010.  The spleen actually looks smaller in size, 13.5 cm compared to 15 cm previously.  In another direction, spleen is 20 cm as compared to  24 cm.  The last dimension was the maximum length.  08/04/2010--initial evaluation by the hematologist. WBCs 3.5. Differential normal. Absolute neutrophil count 2500. Hemoglobin normal at 14.2. MCV low at 83. Platelet count low at 125. IPF normal at 4.6%. Review of old labs through 1997 revealed a white blood cell count has fluctuated between 2.8 and 3.9. There is no trend downward but it is fluctuating. Platelet count has ranged between 148 and 198. MCV has been low. Liver spleen scan revealed mild splenomegaly. B12, folic acid, iron studies, rheumatoid factor, SPEP have all been normal. Felt to be mild pancytopenia secondary to splenomegaly. Patient is followed on a regular basis by the hematologist.    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 03/24/2021    Vitamin D deficiency 03/24/2016         Past Surgical History:   Procedure Laterality Date    CARDIAC CATHETERIZATION  10/08/2010    See past medical history    CAROTID STENT  2006?    COLONOSCOPY  01/09/2012 01/09/2012--normal colonoscopy except for diffuse diverticulosis.     COLONOSCOPY  2004 2004--colonoscopy normal.    COLONOSCOPY N/A 05/31/2022    Procedure: COLONOSCOPY to cecum into TI with hot snare polypectomies;  Surgeon: Raj Lopez MD;  Location: [facility] ENDOSCOPY;  Service: General;  Laterality: N/A;  pre: positive cologuard  post: polyps, diverticulosis, heomorrhoids    CORONARY ANGIOPLASTY WITH STENT PLACEMENT  10/10/2010    10/10/2010--critical 80% proximal LAD stenosis treated using a drug-coated stent. Unable to cross diagonal one.    ESOPHAGOSCOPY / EGD  01/09/2012 01/09/2012--EGD performed for iron deficiency anemia revealed mild erythema of the distal stomach and proximal duodenum consistent with duodenitis.    PROSTATE BIOPSY  07/07/2008 07/07/2008--negative prostate biopsy.  Patient had a history of elevated PSA and found to have benign prostatic hypertrophy.          Allergies   Allergen Reactions     Penicillins     Sulfa Antibiotics            Current Outpatient Medications:     aspirin 81 MG tablet, Take 1 tablet by mouth Daily., Disp: , Rfl:     Coenzyme Q10 (COQ10) 400 MG capsule, Take 1 capsule by mouth daily. Take with a meal., Disp: , Rfl:     memantine (NAMENDA) 5 MG tablet, TAKE 1 TABLET EVERY DAY, Disp: 90 tablet, Rfl: 3    metoprolol succinate XL (TOPROL-XL) 50 MG 24 hr tablet, TAKE 1 TABLET EVERY DAY FOR BLOOD PRESSURE AND HEART, Disp: 90 tablet, Rfl: 3    rivastigmine (EXELON) 1.5 MG capsule, TAKE 1 CAPSULE TWICE DAILY, Disp: 180 capsule, Rfl: 0    rosuvastatin (CRESTOR) 20 MG tablet, TAKE 1 TABLET EVERY DAY FOR HIGH CHOLESTEROL, Disp: 90 tablet, Rfl: 3    vitamin D3 125 MCG (5000 UT) capsule capsule, 1 by mouth daily as directed, Disp: 30 capsule, Rfl:       Family History   Problem Relation Age of Onset    Coronary artery disease Mother     Stroke Mother     No Known Problems Father     Cancer Maternal Grandmother     Heart attack Maternal Grandfather     Heart disease Maternal Grandfather     No Known Problems Paternal Grandmother     No Known Problems Paternal Grandfather          Social History     Socioeconomic History    Marital status:    Tobacco Use    Smoking status: Former     Current packs/day: 0.00     Average packs/day: 1 pack/day for 15.0 years (15.0 ttl pk-yrs)     Types: Cigarettes     Start date: 1960     Quit date: 1975     Years since quittin.3     Passive exposure: Past    Smokeless tobacco: Never    Tobacco comments:     Useage up and down during period.   Vaping Use    Vaping status: Never Used   Substance and Sexual Activity    Alcohol use: Yes     Alcohol/week: 1.0 standard drink of alcohol     Types: 1 Glasses of wine per week     Comment: Socially    Drug use: No    Sexual activity: Not Currently     Partners: Female     Birth control/protection: Condom         Vitals:    25 1107   BP: 138/66   Pulse: 54   Resp: 16   Temp: 97.6 °F (36.4 °C)  "  TempSrc: Oral   SpO2: 96%   Weight: 66.2 kg (146 lb)   Height: 175.3 cm (69.02\")        Body mass index is 21.55 kg/m².      Physical Exam:    General: Alert and oriented x 3.  No acute distress.  Normal affect.  HEENT: Pupils equal, round, reactive to light; extraocular movements intact; sclerae nonicteric; pharynx, ear canals and TMs normal.  Neck: Without JVD, thyromegaly, bruit, or adenopathy.  Lungs: Clear to auscultation in all fields.  Heart: Regular rate and rhythm without murmur, rub, gallop, or click.  Abdomen: Soft, nontender, without hepatosplenomegaly or hernia.  Bowel sounds normal.  : Deferred.  Rectal: Deferred.  Extremities: Without clubbing, cyanosis, edema, or pulse deficit.  Neurologic: Intact without focal deficit.  Normal station and gait observed during ingress and egress from the examination room.  Skin: Without significant lesion.  Musculoskeletal: Unremarkable.    Lab/other results:    CBC normal except white count low at 3.3 and platelets low 128.  CK normal at 35.  CMP normal except glucose 139.  Total cholesterol 114, triglycerides 103, LDL particle #840, HDL particle #28.  Thyroid function tests are normal.  PSA elevated at 8.7.  Urinalysis reveals 1+ protein and trace ketones.  Microscopic exam reveals casts present.  Vitamin D normal at 59.9.  SARS antibodies are positive.    Assessment/Plan:     Diagnosis Plan   1. Hyperlipidemia  CBC (No Diff)    CK    Comprehensive Metabolic Panel    NMR LipoProfile    TSH    T4, Free    T3, Free      2. Benign essential hypertension  CK    Comprehensive Metabolic Panel      3. Benign prostatic hypertrophy, 07/07/2008--negative biopsy.  PSA DIAGNOSTIC      4. Vitamin D deficiency        5. Elevated PSA  PSA DIAGNOSTIC      6. Asymptomatic cholelithiasis        7. Thrombocytopenia  CBC (No Diff)      8. History of colon polyps, 5/31/2022--tubular adenoma x2.        9. Carotid artery plaque, 4/26/2021--mild bilateral.  09/10/2018--mild bilateral. " 08/11/2016--mild bilateral carotid artery plaque.        10. Coronary artery disease involving native coronary artery of native heart without angina pectoris        11. Gastroesophageal reflux disease without esophagitis        12. Generalized osteoarthritis of multiple sites        13. History of myocardial infarction, 10/08/2010--patient presented with an anterior myocardial infarction.        14. History of PTCA, 10/08/2010--anterior MI.  EF 40%.  PTCA with drug-eluting stent proximal LAD.  10% LM.  100% diagonal 1.  30% diffuse circumflex.  30% diffuse RCA.  2011--EF 64%        15. Splenomegaly        16. Multiple actinic keratoses        17. Microscopic hematuria        18. Mild cognitive impairment with memory loss        19. History of 2019 novel coronavirus disease (COVID-19)  SARS-CoV-2 Antibodies, Nucleocapsid (Natural Immunity)      20. Therapeutic drug monitoring          Patient with multiple chronic medical problems noted above that all seem to be fairly stable.  He has had memory loss and is now on Maxolon and Namenda seems to be tolerating it.  I do not see any reason to make any changes to his medications.    Plan is as follows: No change in current medical regimen.  Patient will follow-up in 6 months with lab prior or follow-up as needed.        Procedures

## 2025-05-23 RX ORDER — MEMANTINE HYDROCHLORIDE 5 MG/1
5 TABLET ORAL DAILY
Qty: 90 TABLET | Refills: 3 | Status: SHIPPED | OUTPATIENT
Start: 2025-05-23

## 2025-05-30 ENCOUNTER — HOSPITAL ENCOUNTER (OUTPATIENT)
Dept: MRI IMAGING | Facility: HOSPITAL | Age: 85
Discharge: HOME | End: 2025-05-30
Payer: MEDICARE

## 2025-05-30 DIAGNOSIS — R51.9 PERSISTENT HEADACHES: ICD-10-CM

## 2025-05-30 PROCEDURE — A9577 INJ MULTIHANCE: HCPCS

## 2025-05-30 PROCEDURE — 70553 MRI BRAIN STEM W/O & W/DYE: CPT

## 2025-05-30 PROCEDURE — 25510000002 GADOBENATE DIMEGLUMINE 529 MG/ML SOLUTION

## 2025-05-30 RX ADMIN — GADOBENATE DIMEGLUMINE 13 ML: 529 INJECTION, SOLUTION INTRAVENOUS at 14:30

## 2025-06-03 ENCOUNTER — OFFICE VISIT (OUTPATIENT)
Dept: NEUROLOGY | Facility: CLINIC | Age: 85
End: 2025-06-03
Payer: MEDICARE

## 2025-06-03 VITALS
BODY MASS INDEX: 22.29 KG/M2 | WEIGHT: 151 LBS | HEART RATE: 53 BPM | DIASTOLIC BLOOD PRESSURE: 66 MMHG | OXYGEN SATURATION: 96 % | SYSTOLIC BLOOD PRESSURE: 122 MMHG

## 2025-06-03 DIAGNOSIS — G44.229 CHRONIC TENSION-TYPE HEADACHE, NOT INTRACTABLE: ICD-10-CM

## 2025-06-03 DIAGNOSIS — R41.9 NEUROCOGNITIVE DISORDER: Primary | ICD-10-CM

## 2025-06-03 NOTE — PROGRESS NOTES
"DOS: 6/3/2025  NAME: Sergey Mendoza   : 1940  PCP: Ramesh Blake MD    Chief Complaint   Patient presents with    Mild cognitive impairment with memory loss      SUBJECTIVE  Neurological Problem:  85 y.o. right-handed male with a past medical history of hypertension, hyperlipidemia, BPH, elevated PSA, osteoarthritis, GERD, CAD, MI (2010, stent x1), MCI, anxiety. They are seen in follow up today for cognitive impairment. A summary of the history was taken from the previous note with additions/modifications as indicated. He is accompanied by his spouse.    Interval History:   **For detailed interval history see progress note dated 10/2/2023.     Mr. Mendoza has been followed by Dr. Ontiveros and now myself for mild cognitive impairment. He was seen by [facility] for neuropsychological evaluation in 2022 which showed mild cognitive impairment, etiology somewhat speculative and partially at least a result from small vessel ischemia. An unspecified anxiety disorder was also diagnosed. He attended cognitive rehabilitation and therapy in 2023. He was tried and unable to tolerate donepezil. He has also been tried on memantine, increased to 10 mg daily, unable to tolerate 10 mg twice daily due to lethargy and now takes 5 mg daily. He was initiated on rivastigmine 1.5 mg BID in 2024, tolerates it well. He also reported a persistent dull headache behind his eyes that radiated to his posterior head. Recommended Tylenol OTC but he declines this mediation due to \"taking too many pills\". MRI brain w/without was ordered when I last saw him in 2024 because of the continued headache.    He presents today in follow-up and denies any significant worsening or changes to his memory however he does acknowledge that his memory is not as good as it once was.  He does admit to forgetting the names of family members.  His spouse is present and states that he also repeats questions.  He denies any " driving issues or getting lost, denies difficulties with use of his mobile phone, denies trouble managing his medications or handling his finances however his spouse indicates issues with each of these things.  His spouse also gave us a letter that his daughter composed of issues she has observed recently.  This letter is scanned under the media tab.  His spouse says they were recently in [facility] on a family vacation and the patient got out of the car without putting in the park.  He also drove up on a curb.  He does not like to use his mobile phone because he has difficulty operating it.  He has mixed up his a.m. and p.m. medications.  He has received overdue bill notices and is charged late fees, also gets caught up in ads on the mobile phone and will input his credit card information.  He did have MRI brain with and without contrast completed on May 30 that showed no acute intracranial abnormality and no significant change when compared to prior MRI brain dated 8/20/2022.  Mild to moderate small vessel disease and mild diffuse cerebral atrophy noted.    Sleep is good at night but naps frequently during day. No nighttime wanderings or leaving unannounced. No hallucinations. Moodier more than he used to be, agitated easier but no physical aggression.   Appetite is decreased, has lost some weight recently. No problems swallowing, no choking. Does acknowledge loss of smell. Some issues with constipation. No urinary issues. One firearm in home, no bullets. Fell on ice in January 2025, presented [facility] AM. CT head without and CT c-spine showed no acute issues. He denied LOC. He does not use an assistive device. No changes in ambulation.     Functional Status (I: Independent, A: Assisted, D: Dependent)    ADLs    I A D Notes   Bathing [X] [] []   Dressing [X] [] []   Toileting [X] [] []   Transfers [X] [] []   Feeding [X] [] []   Ambulation [X] [] []     IADLs        I A D   Telephone [X] [] []    Transportation [] [X] []   Shopping [X] [] []   Meal prep [X] [] []   Housework [X] [] []   Medications [] [X] []   Finances [] [X] []       Review of Systems   HENT:  Negative for trouble swallowing.    Eyes:  Negative for visual disturbance.   Musculoskeletal:  Negative for gait problem.   Neurological:  Positive for headaches. Negative for dizziness, tremors, facial asymmetry, speech difficulty, weakness and numbness.   Psychiatric/Behavioral:  Positive for agitation and confusion.         The following portions of the patient's history were reviewed and updated as appropriate: allergies, current medications, past family history, past medical history, past social history, past surgical history and problem list.    Current Medications:   Current Outpatient Medications:     aspirin 81 MG tablet, Take 1 tablet by mouth Daily., Disp: , Rfl:     Coenzyme Q10 (COQ10) 400 MG capsule, Take 1 capsule by mouth daily. Take with a meal., Disp: , Rfl:     metoprolol succinate XL (TOPROL-XL) 50 MG 24 hr tablet, TAKE 1 TABLET EVERY DAY FOR BLOOD PRESSURE AND HEART, Disp: 90 tablet, Rfl: 3    rivastigmine (EXELON) 1.5 MG capsule, TAKE 1 CAPSULE TWICE DAILY, Disp: 180 capsule, Rfl: 0    rosuvastatin (CRESTOR) 20 MG tablet, TAKE 1 TABLET EVERY DAY FOR HIGH CHOLESTEROL, Disp: 90 tablet, Rfl: 3    vitamin D3 125 MCG (5000 UT) capsule capsule, 1 by mouth daily as directed, Disp: 30 capsule, Rfl:   **I did not stop or change the above medications.  Patient's medication list was updated to reflect medications they have reported as currently taking, including medication changes made by other providers.    Objective   Vital Signs:  /66   Pulse 53   Wt 68.5 kg (151 lb)   SpO2 96%   BMI 22.29 kg/m²   Body mass index is 22.29 kg/m².    Physical Exam   Physical Exam:  GENERAL: NAD, alert, vital signs reviewed  HEENT: Normocephalic, atraumatic     Neurological:   MS: AOx3, recent/remote memory intact, normal  "attention/concentration, language intact, no neglect. No asterixis. No overshoot. 5-min recall 0/3 (1 with hint), MMSE 25/30. 4/4 clock draw. 10 animals named in 1 min.   CN: visual acuity grossly normal, PERRL, EOMI, no facial droop, no dysarthria  Motor: No pronator drift. Normal tone. No appreciable atrophy, fasciculations or abnormal movements. No tremor.   Sensory: Crude touch: Intact in all four ext.  Gait and station: Normal gait and station    Result Review :  The following data was reviewed by: JASS Feldman on 06/03/2025:  Laboratory Results:         Lab Results   Component Value Date    WBC 3.3 (L) 04/09/2025    HGB 13.4 04/09/2025    HCT 41.9 04/09/2025    MCV 87 04/09/2025     (L) 04/09/2025     Lab Results   Component Value Date    GLUCOSE 139 (H) 04/09/2025    BUN 16 04/09/2025    CREATININE 1.13 04/09/2025    EGFRIFNONA 73 11/26/2021    EGFRIFAFRI 76 03/09/2020    BCR 14 04/09/2025    K 4.5 04/09/2025    CO2 27 04/09/2025    CALCIUM 9.6 04/09/2025    ALBUMIN 4.5 04/09/2025    AST 19 04/09/2025    ALT 18 04/09/2025     No results found for: \"HGBA1C\"  No results found for: \"CHOL\"  Lab Results   Component Value Date    HDL 34 (L) 04/27/2015     Lab Results   Component Value Date    LDL 46 07/26/2016    LDL 28 04/27/2015     Lab Results   Component Value Date    TRIG 103 04/09/2025    TRIG 129 04/04/2024    TRIG 98 03/27/2023     No results found for: \"RPR\"  Lab Results   Component Value Date    TSH 3.420 04/09/2025     No results found for: \"TWHGJXUD15\"    Data reviewed : Radiologic studies             Assessment and Plan   Diagnoses and all orders for this visit:    1. Neurocognitive disorder (Primary)  -     Ambulatory Referral to Occupational Therapy for Evaluation & Treatment    2. Chronic tension-type headache, not intractable      Based on what Leeroy's spouse told us today about the changes she has noticed in him along with the letter received from his 2 adult children, I am concerned " that he has had a progression of his cognitive impairment into a progressive neurocognitive disorder.  I recommend no driving until he is evaluated by [facility] occupational therapy.  I also recommend that his spouse or some other trusted family/friend have more oversight in his medications and finances.  It would also be prudent to have the firearm removed from the home.  I reached out to his PCP Dr. Blake to relay these concerns.  We will go ahead and discontinue memantine as it could be affecting his appetite though this seems less likely at 5 mg daily.  He can continue the Exelon 1.5 mg twice daily for now.    We will see Leeroy back in 6 months with Dr. Ontiveros, sooner if symptoms warrant.     JASS Feldman  [facility]   06/03/25       I spent 50 minutes caring for Sergey on this date of service. This time includes time spent by me in the following activities:preparing for the visit, reviewing tests, obtaining and/or reviewing a separately obtained history, performing a medically appropriate examination and/or evaluation , counseling and educating the patient/family/caregiver, ordering medications, tests, or procedures, referring and communicating with other health care professionals , documenting information in the medical record, independently interpreting results and communicating that information with the patient/family/caregiver, and care coordination  Follow Up   No follow-ups on file.  Patient was given instructions and counseling regarding his condition or for health maintenance advice. Please see specific information pulled into the AVS if appropriate.       Dictated using Dragon Dictation    As of April 2021, as required by the Federal 21st Century Cures Act, medical records (including provider notes and laboratory/imaging results) are to be made available to patient’s and/or their designees as soon as the documents are signed/resulted. While the intention is to ensure transparency and to  engage the patient in their healthcare, this immediate access may create unintended consequences as this document uses language intended for communication between medical experts and diagnostic results are interpreted with the entirety of the patient’s clinical picture in mind. It is recommended that patients and/or their designees review all available information with their primary or specialist providers for explanation and guidance to avoid misinterpretation based on layperson understanding, non-medical expert opinions, or Internet searches.

## 2025-06-12 ENCOUNTER — TELEPHONE (OUTPATIENT)
Dept: NEUROLOGY | Facility: CLINIC | Age: 85
End: 2025-06-12
Payer: MEDICARE

## 2025-06-12 DIAGNOSIS — G31.84 MILD COGNITIVE IMPAIRMENT: Primary | ICD-10-CM

## 2025-06-12 NOTE — TELEPHONE ENCOUNTER
RejiJes vega ()    [phone] (Home)        PT'S SON IS REQUESTING HIS FATHER TO GET A EVALUATION BY A NEUROLOGIST IN [place], THE PT'S WIFE STATES SHE WANTS TO KEEP PT UNDER 'S CARE AND DOES NOT WANT TO TRANSFER CARE BUT AS A FAVOR TO HER SON SHE IS ASKING IF  CAN SEND A REFERRAL TO THE FOLLOWING PROVIDER PLEASE.     Hayden Thompson MD, PhD  Address: [address] [place] [address], [facility], [facility], [place] [address]  Phone: [phone]  Fax Number. [phone]

## 2025-06-13 DIAGNOSIS — G31.84 MILD COGNITIVE IMPAIRMENT WITH MEMORY LOSS: Chronic | ICD-10-CM

## 2025-06-13 RX ORDER — RIVASTIGMINE TARTRATE 1.5 MG/1
1.5 CAPSULE ORAL 2 TIMES DAILY
Qty: 180 CAPSULE | Refills: 3 | Status: SHIPPED | OUTPATIENT
Start: 2025-06-13

## 2025-06-18 ENCOUNTER — TELEPHONE (OUTPATIENT)
Dept: NEUROLOGY | Facility: CLINIC | Age: 85
End: 2025-06-18
Payer: MEDICARE

## 2025-06-18 NOTE — TELEPHONE ENCOUNTER
Caller: Jes Mendoza    Relationship: Emergency Contact    Best call back number: [phone]      Who are you requesting to speak with (clinical staff, provider,  specific staff member): REFERRAL COORDINATOR     What was the call regarding: CALLING TO SEE IF THE REFERRAL WAS FAXED YET, SHE STATES SHE SEE'S THE ORDER WAS PLACED BUT NOT SURE IF IT WAS SENT YET AS NO ONE HAS CONTACTED HER FROM THAT OFFICE.     PLEASE CALL WHEN FAXED SO SHE MAY CONTACT THE OTHER PROVIDER TO GET SCHEDULED.

## 2025-08-16 DIAGNOSIS — I25.10 CORONARY ARTERY DISEASE INVOLVING NATIVE CORONARY ARTERY OF NATIVE HEART WITHOUT ANGINA PECTORIS: Chronic | ICD-10-CM

## 2025-08-16 DIAGNOSIS — I10 BENIGN ESSENTIAL HYPERTENSION: Chronic | ICD-10-CM

## 2025-08-18 RX ORDER — METOPROLOL SUCCINATE 50 MG/1
TABLET, EXTENDED RELEASE ORAL
Qty: 90 TABLET | Refills: 3 | Status: SHIPPED | OUTPATIENT
Start: 2025-08-18

## 2025-08-27 ENCOUNTER — TELEPHONE (OUTPATIENT)
Dept: NEUROLOGY | Facility: CLINIC | Age: 85
End: 2025-08-27
Payer: MEDICARE

## (undated) DEVICE — SENSR O2 OXIMAX FNGR A/ 18IN NONSTR

## (undated) DEVICE — ADAPT CLN BIOGUARD AIR/H2O DISP

## (undated) DEVICE — THE SINGLE USE ETRAP – POLYP TRAP IS USED FOR SUCTION RETRIEVAL OF ENDOSCOPICALLY REMOVED POLYPS.: Brand: ETRAP

## (undated) DEVICE — CANN O2 ETCO2 FITS ALL CONN CO2 SMPL A/ 7IN DISP LF

## (undated) DEVICE — LN SMPL CO2 SHTRM SD STREAM W/M LUER

## (undated) DEVICE — SNAR POLYP SENSATION STDOVL 27 240 BX40

## (undated) DEVICE — TUBING, SUCTION, 1/4" X 10', STRAIGHT: Brand: MEDLINE

## (undated) DEVICE — KT ORCA ORCAPOD DISP STRL